# Patient Record
Sex: MALE | Race: WHITE | Employment: FULL TIME | URBAN - METROPOLITAN AREA
[De-identification: names, ages, dates, MRNs, and addresses within clinical notes are randomized per-mention and may not be internally consistent; named-entity substitution may affect disease eponyms.]

---

## 2017-03-01 ENCOUNTER — ONCOLOGY VISIT (OUTPATIENT)
Dept: ONCOLOGY | Facility: CLINIC | Age: 52
End: 2017-03-01
Attending: INTERNAL MEDICINE
Payer: COMMERCIAL

## 2017-03-01 VITALS
DIASTOLIC BLOOD PRESSURE: 87 MMHG | SYSTOLIC BLOOD PRESSURE: 126 MMHG | TEMPERATURE: 98.1 F | WEIGHT: 273.8 LBS | RESPIRATION RATE: 20 BRPM | BODY MASS INDEX: 35.39 KG/M2 | OXYGEN SATURATION: 96 % | HEART RATE: 118 BPM

## 2017-03-01 DIAGNOSIS — C82.80 OTHER TYPE OF FOLLICULAR LYMPHOMA, UNSPECIFIED BODY REGION (H): Primary | ICD-10-CM

## 2017-03-01 DIAGNOSIS — C82.80 OTHER TYPE OF FOLLICULAR LYMPHOMA, UNSPECIFIED BODY REGION (H): ICD-10-CM

## 2017-03-01 LAB
ALBUMIN SERPL-MCNC: 4.1 G/DL (ref 3.4–5)
ALP SERPL-CCNC: 98 U/L (ref 40–150)
ALT SERPL W P-5'-P-CCNC: 27 U/L (ref 0–70)
ANION GAP SERPL CALCULATED.3IONS-SCNC: 9 MMOL/L (ref 3–14)
AST SERPL W P-5'-P-CCNC: 14 U/L (ref 0–45)
BASOPHILS # BLD AUTO: 0.1 10E9/L (ref 0–0.2)
BASOPHILS NFR BLD AUTO: 1 %
BILIRUB SERPL-MCNC: 0.4 MG/DL (ref 0.2–1.3)
BUN SERPL-MCNC: 12 MG/DL (ref 7–30)
CALCIUM SERPL-MCNC: 8.8 MG/DL (ref 8.5–10.1)
CHLORIDE SERPL-SCNC: 106 MMOL/L (ref 94–109)
CO2 SERPL-SCNC: 25 MMOL/L (ref 20–32)
CREAT SERPL-MCNC: 0.95 MG/DL (ref 0.66–1.25)
DIFFERENTIAL METHOD BLD: ABNORMAL
EOSINOPHIL # BLD AUTO: 0.1 10E9/L (ref 0–0.7)
EOSINOPHIL NFR BLD AUTO: 1.8 %
ERYTHROCYTE [DISTWIDTH] IN BLOOD BY AUTOMATED COUNT: 11.9 % (ref 10–15)
GFR SERPL CREATININE-BSD FRML MDRD: 84 ML/MIN/1.7M2
GLUCOSE SERPL-MCNC: 85 MG/DL (ref 70–99)
HCT VFR BLD AUTO: 43.2 % (ref 40–53)
HGB BLD-MCNC: 16 G/DL (ref 13.3–17.7)
IMM GRANULOCYTES # BLD: 0.1 10E9/L (ref 0–0.4)
IMM GRANULOCYTES NFR BLD: 1.5 %
LDH SERPL L TO P-CCNC: 264 U/L (ref 85–227)
LYMPHOCYTES # BLD AUTO: 1.6 10E9/L (ref 0.8–5.3)
LYMPHOCYTES NFR BLD AUTO: 23.5 %
MCH RBC QN AUTO: 32.3 PG (ref 26.5–33)
MCHC RBC AUTO-ENTMCNC: 37 G/DL (ref 31.5–36.5)
MCV RBC AUTO: 87 FL (ref 78–100)
MONOCYTES # BLD AUTO: 0.8 10E9/L (ref 0–1.3)
MONOCYTES NFR BLD AUTO: 11.4 %
NEUTROPHILS # BLD AUTO: 4.1 10E9/L (ref 1.6–8.3)
NEUTROPHILS NFR BLD AUTO: 60.8 %
NRBC # BLD AUTO: 0 10*3/UL
NRBC BLD AUTO-RTO: 0 /100
PLATELET # BLD AUTO: 199 10E9/L (ref 150–450)
POTASSIUM SERPL-SCNC: 3.7 MMOL/L (ref 3.4–5.3)
PROT SERPL-MCNC: 7 G/DL (ref 6.8–8.8)
RBC # BLD AUTO: 4.95 10E12/L (ref 4.4–5.9)
SODIUM SERPL-SCNC: 140 MMOL/L (ref 133–144)
WBC # BLD AUTO: 6.8 10E9/L (ref 4–11)

## 2017-03-01 PROCEDURE — 85025 COMPLETE CBC W/AUTO DIFF WBC: CPT | Performed by: INTERNAL MEDICINE

## 2017-03-01 PROCEDURE — 80053 COMPREHEN METABOLIC PANEL: CPT | Performed by: INTERNAL MEDICINE

## 2017-03-01 PROCEDURE — 36415 COLL VENOUS BLD VENIPUNCTURE: CPT

## 2017-03-01 PROCEDURE — 83615 LACTATE (LD) (LDH) ENZYME: CPT | Performed by: INTERNAL MEDICINE

## 2017-03-01 PROCEDURE — 99213 OFFICE O/P EST LOW 20 MIN: CPT | Mod: ZP | Performed by: INTERNAL MEDICINE

## 2017-03-01 PROCEDURE — 99212 OFFICE O/P EST SF 10 MIN: CPT | Mod: ZF

## 2017-03-01 RX ORDER — IBUPROFEN 200 MG
200 TABLET ORAL
COMMUNITY
Start: 2014-10-20 | End: 2018-02-14

## 2017-03-01 NOTE — PROGRESS NOTES
HISTORY OF PRESENT ILLNESS:  I saw and examined Julien Lee as he returns in followup of his diagnosis of follicular lymphoma.  The patient is a 51-year-old originally diagnosed in 07/2002.  Over the past 14-1/2 years he has received multiple therapies including oral chlorambucil, rituximab as a single agent, R-CVP and, most recently, R-bendamustine.  The latter was given for 6 cycles ending in 03/2015.  The patient had an excellent response to this treatment and has been followed without evidence for progressive disease since the end of treatment.       The patient was last in my clinic on 11/16/2016.  At present, he feels quite well with no complaints on a 10-point review of systems.  He has had no interval infections.      MEDICATIONS:  None.      ALLERGIES:  None.      PHYSICAL EXAMINATION:   VITAL SIGNS:  Weight 124.2 kg (stable), blood pressure 126/87, pulse 80 and regular,    respirations 16-20, O2 saturation 96%, temperature 98.1.   HEENT:  Anicteric.  No conjunctival injection.  Pupils equal and reactive.  EOM - intact.     Oropharynx - no masses.  Mucosa - moist without plaque or ulceration.    NECK:  No cervical/supraclavicular adenopathy.    CHEST:  Clear to auscultation.    AXILLAE:  No nodes.    HEART:  Regular rhythm without murmur.    ABDOMEN:  Soft and nontender.  Body habitus makes examination difficult, but no organomegaly or mass detected.  Bowel sounds active.  No inguinal nodes.     SKIN AND SOFT TISSUE:  No temporal masses (right temporal subcutaneous involvement prior to most recent chemotherapy).       LABORATORY:     Hemoglobin 16.0 grams percent with 6,800 wbc's (normal differential) and 199,000 platelets.   Electrolytes, calcium, creatinine (0.95) - normal.  Glucose 85.    Liver enzymes - normal.  LDH - borderline elevated at 264 (normal <227).       ASSESSMENT AND PLAN:  Follicular lymphoma (grade 1), stage DERIK - Mr. Lee is now 2 years following completion of 6 cycles of rituximab  plus bendamustine.  He has no clinical evidence of recurrent disease and no significant residua from the treatment.  Will monitor LDH.     The patient will return in approximately 4 months with laboratory studies and CT scan.     Von Zaidi MD  Professor of Medicine  Oncology  [facility]  Office: [phone]  Clinic Fax: [phone]

## 2017-03-01 NOTE — MR AVS SNAPSHOT
After Visit Summary   3/1/2017    Julien Lee    MRN: [id number]           Patient Information     Date Of Birth          1965        Visit Information        Provider Department      3/1/2017 3:30 PM Von Zaidi MD [facility]        Today's Diagnoses     Other type of follicular lymphoma, unspecified body region (H)    -  1      Care Instructions    Return in July with labs and CT        Follow-ups after your visit        Follow-up notes from your care team     Return in about 4 months (around 7/12/2017) for Physical Exam, Lab Work.      Your next 10 appointments already scheduled     Jul 12, 2017  3:00 PM CDT   [facility] Lab Draw with  [facility] LAB DRAW   [facility] Lab Draw ([facility])    [address] [place] [id number] [phone]            Jul 12, 2017  3:30 PM CDT   (Arrive by 3:15 PM)   Return Visit with Von Zaidi MD   [facility] ([facility])    [address] [place] [id number] [phone]              Future tests that were ordered for you today     Open Future Orders        Priority Expected Expires Ordered    CT Chest/Abdomen/Pelvis w Contrast Routine 7/1/2017 3/1/2018 3/1/2017    Comprehensive metabolic panel Routine 7/1/2017 3/1/2018 3/1/2017    Lactate Dehydrogenase Routine 7/1/2017 3/1/2018 3/1/2017    *CBC with platelets differential Routine 7/1/2017 3/1/2018 3/1/2017            Who to contact     If you have questions or need follow up information about today's clinic visit or your schedule please contact [facility] directly at [phone].  Normal or non-critical lab and imaging results will be communicated to you by MyChart, letter or phone within 4 business days after the clinic has received the results. If you do not hear from us within 7 days, please contact the clinic through  [employer] or phone. If you have a critical or abnormal lab result, we will notify you by phone as soon as possible.  Submit refill requests through [employer] or call your pharmacy and they will forward the refill request to us. Please allow 3 business days for your refill to be completed.          Additional Information About Your Visit        [employer] Information     [employer] gives you secure access to your electronic health record. If you see a primary care provider, you can also send messages to your care team and make appointments. If you have questions, please call your primary care clinic.  If you do not have a primary care provider, please call [phone] and they will assist you.        Care EveryWhere ID     This is your Care EveryWhere ID. This could be used by other organizations to access your [place] medical records  [id number]        Your Vitals Were     Pulse Temperature Respirations Pulse Oximetry BMI (Body Mass Index)       118 98.1  F (36.7  C) (Oral) 20 96% 35.39 kg/m2        Blood Pressure from Last 3 Encounters:   03/01/17 126/87   11/16/16 133/89   08/10/16 147/80    Weight from Last 3 Encounters:   03/01/17 124.2 kg (273 lb 12.8 oz)   11/16/16 122.4 kg (269 lb 13.5 oz)   08/10/16 123.5 kg (272 lb 3.2 oz)               Primary Care Provider    Unknown Primary MD Caridad       No address on file        Thank you!     Thank you for choosing [facility]  for your care. Our goal is always to provide you with excellent care. Hearing back from our patients is one way we can continue to improve our services. Please take a few minutes to complete the written survey that you may receive in the mail after your visit with us. Thank you!             Your Updated Medication List - Protect others around you: Learn how to safely use, store and throw away your medicines at www.disposemymeds.org.          This list is accurate as of: 3/1/17  4:17 PM.  Always use your most recent med list.                    Brand Name Dispense Instructions for use    ibuprofen 200 MG tablet    ADVIL/MOTRIN     Take 200 mg by mouth

## 2017-03-01 NOTE — Clinical Note
3/1/2017       RE: Julien Lee  [address] [facility] [address]     Dear Colleague,    Thank you for referring your patient, Julien Lee, to the [facility]. Please see a copy of my visit note below.    HISTORY OF PRESENT ILLNESS:  I saw and examined Julien Lee as he returns in followup of his diagnosis of follicular lymphoma.  The patient is a 51-year-old originally diagnosed in 07/2002.  Over the past 14-1/2 years he has received multiple therapies including oral chlorambucil, rituximab as a single agent, R-CVP and, most recently, R-bendamustine.  The latter was given for 6 cycles ending in 03/2015.  The patient had an excellent response to this treatment and has been followed without evidence for progressive disease since the end of treatment.       The patient was last in my clinic on 11/16/2016.  At present, he feels quite well with no complaints on a 10-point review of systems.  He has had no interval infections.      MEDICATIONS:  None.      ALLERGIES:  None.      PHYSICAL EXAMINATION:   VITAL SIGNS:  Weight 124.2 kg (stable), blood pressure 126/87, pulse 80 and regular,    respirations 16-20, O2 saturation 96%, temperature 98.1.   HEENT:  Anicteric.  No conjunctival injection.  Pupils equal and reactive.  EOM - intact.     Oropharynx - no masses.  Mucosa - moist without plaque or ulceration.    NECK:  No cervical/supraclavicular adenopathy.    CHEST:  Clear to auscultation.    AXILLAE:  No nodes.    HEART:  Regular rhythm without murmur.    ABDOMEN:  Soft and nontender.  Body habitus makes examination difficult, but no organomegaly or mass detected.  Bowel sounds active.  No inguinal nodes.     SKIN AND SOFT TISSUE:  No temporal masses (right temporal subcutaneous involvement prior to most recent chemotherapy).       LABORATORY:     Hemoglobin 16.0 grams percent with 6,800 wbc's (normal differential) and 199,000 platelets.   Electrolytes, calcium, creatinine  (0.95) - normal.  Glucose 85.    Liver enzymes - normal.  LDH - borderline elevated at 264 (normal <227).       ASSESSMENT AND PLAN:  Follicular lymphoma (grade 1), stage DERIK - Mr. Lee is now 2 years following completion of 6 cycles of rituximab plus bendamustine.  He has no clinical evidence of recurrent disease and no significant residua from the treatment.  Will monitor LDH.     The patient will return in approximately 4 months with laboratory studies and CT scan.     Von Zaidi MD  Professor of Medicine  Oncology  [facility]  Office: [phone]  Clinic Fax: [phone]            Again, thank you for allowing me to participate in the care of your patient.      Sincerely,    Von Zaidi MD

## 2017-03-01 NOTE — NURSING NOTE
"Julien Lee is a 51 year old male who presents for:  Chief Complaint   Patient presents with     Blood Draw     Pt with hx of lymphoma labs collected via venipuncture.      Oncology Clinic Visit     Return patient visit- Follicular lymphoma (H)        Initial Vitals:  /87  Pulse 118  Temp 98.1  F (36.7  C) (Oral)  Resp 20  Wt 124.2 kg (273 lb 12.8 oz)  SpO2 96%  BMI 35.39 kg/m2 Estimated body mass index is 35.39 kg/(m^2) as calculated from the following:    Height as of 8/10/16: 1.873 m (6' 1.75\").    Weight as of this encounter: 124.2 kg (273 lb 12.8 oz).. Body surface area is 2.54 meters squared. BP completed using cuff size: NA (Not Taken)  Data Unavailable No LMP for male patient. Allergies and medications reviewed.     Medications: Medication refills not needed today.  Pharmacy name entered into [employer]:    [id number] [place] [facility] - [place], [place] - [address] [place] [address] PHARMACY [facility] - [place], [place] - [address] [facility] [phone]    Comments: vitals done in lab    5 minutes for nursing intake (face to face time)   Angie Michele CMA        "

## 2017-03-01 NOTE — NURSING NOTE
Chief Complaint   Patient presents with     Blood Draw     Pt with hx of lymphoma labs collected via venipuncture.

## 2017-06-26 DIAGNOSIS — C82.80 OTHER TYPE OF FOLLICULAR LYMPHOMA, UNSPECIFIED BODY REGION (H): Primary | ICD-10-CM

## 2017-07-10 DIAGNOSIS — C82.80 OTHER TYPE OF FOLLICULAR LYMPHOMA, UNSPECIFIED BODY REGION (H): ICD-10-CM

## 2017-07-10 LAB
ALBUMIN SERPL-MCNC: 4.2 G/DL (ref 3.4–5)
ALP SERPL-CCNC: 90 U/L (ref 40–150)
ALT SERPL W P-5'-P-CCNC: 29 U/L (ref 0–70)
ANION GAP SERPL CALCULATED.3IONS-SCNC: 8 MMOL/L (ref 3–14)
AST SERPL W P-5'-P-CCNC: 16 U/L (ref 0–45)
BASOPHILS # BLD AUTO: 0 10E9/L (ref 0–0.2)
BASOPHILS NFR BLD AUTO: 0.4 %
BILIRUB SERPL-MCNC: 1.2 MG/DL (ref 0.2–1.3)
BUN SERPL-MCNC: 18 MG/DL (ref 7–30)
CALCIUM SERPL-MCNC: 9.1 MG/DL (ref 8.5–10.1)
CHLORIDE SERPL-SCNC: 106 MMOL/L (ref 94–109)
CHOLEST SERPL-MCNC: 146 MG/DL
CO2 SERPL-SCNC: 23 MMOL/L (ref 20–32)
CREAT SERPL-MCNC: 0.94 MG/DL (ref 0.66–1.25)
DIFFERENTIAL METHOD BLD: NORMAL
EOSINOPHIL # BLD AUTO: 0.1 10E9/L (ref 0–0.7)
EOSINOPHIL NFR BLD AUTO: 1 %
ERYTHROCYTE [DISTWIDTH] IN BLOOD BY AUTOMATED COUNT: 11.7 % (ref 10–15)
GFR SERPL CREATININE-BSD FRML MDRD: 84 ML/MIN/1.7M2
GLUCOSE SERPL-MCNC: 102 MG/DL (ref 70–99)
HCT VFR BLD AUTO: 45.2 % (ref 40–53)
HDLC SERPL-MCNC: 37 MG/DL
HGB BLD-MCNC: 15.7 G/DL (ref 13.3–17.7)
IMM GRANULOCYTES # BLD: 0 10E9/L (ref 0–0.4)
IMM GRANULOCYTES NFR BLD: 0.4 %
LDH SERPL L TO P-CCNC: 202 U/L (ref 85–227)
LDLC SERPL CALC-MCNC: 93 MG/DL
LYMPHOCYTES # BLD AUTO: 1.1 10E9/L (ref 0.8–5.3)
LYMPHOCYTES NFR BLD AUTO: 15.7 %
MCH RBC QN AUTO: 30.9 PG (ref 26.5–33)
MCHC RBC AUTO-ENTMCNC: 34.7 G/DL (ref 31.5–36.5)
MCV RBC AUTO: 89 FL (ref 78–100)
MONOCYTES # BLD AUTO: 0.7 10E9/L (ref 0–1.3)
MONOCYTES NFR BLD AUTO: 9.7 %
NEUTROPHILS # BLD AUTO: 5.2 10E9/L (ref 1.6–8.3)
NEUTROPHILS NFR BLD AUTO: 72.8 %
NONHDLC SERPL-MCNC: 109 MG/DL
NRBC # BLD AUTO: 0 10*3/UL
NRBC BLD AUTO-RTO: 0 /100
PLATELET # BLD AUTO: 162 10E9/L (ref 150–450)
POTASSIUM SERPL-SCNC: 3.9 MMOL/L (ref 3.4–5.3)
PROT SERPL-MCNC: 7.1 G/DL (ref 6.8–8.8)
RBC # BLD AUTO: 5.08 10E12/L (ref 4.4–5.9)
SODIUM SERPL-SCNC: 137 MMOL/L (ref 133–144)
TRIGL SERPL-MCNC: 78 MG/DL
WBC # BLD AUTO: 7.1 10E9/L (ref 4–11)

## 2017-07-12 ENCOUNTER — ONCOLOGY VISIT (OUTPATIENT)
Dept: ONCOLOGY | Facility: CLINIC | Age: 52
End: 2017-07-12
Attending: INTERNAL MEDICINE
Payer: COMMERCIAL

## 2017-07-12 VITALS
OXYGEN SATURATION: 99 % | BODY MASS INDEX: 31.26 KG/M2 | HEIGHT: 74 IN | SYSTOLIC BLOOD PRESSURE: 126 MMHG | DIASTOLIC BLOOD PRESSURE: 85 MMHG | RESPIRATION RATE: 14 BRPM | WEIGHT: 243.6 LBS | HEART RATE: 93 BPM

## 2017-07-12 DIAGNOSIS — C82.80 OTHER TYPE OF FOLLICULAR LYMPHOMA, UNSPECIFIED BODY REGION (H): Primary | ICD-10-CM

## 2017-07-12 PROCEDURE — 99212 OFFICE O/P EST SF 10 MIN: CPT | Mod: ZF

## 2017-07-12 PROCEDURE — 99213 OFFICE O/P EST LOW 20 MIN: CPT | Mod: ZP | Performed by: INTERNAL MEDICINE

## 2017-07-12 ASSESSMENT — PAIN SCALES - GENERAL: PAINLEVEL: NO PAIN (0)

## 2017-07-12 NOTE — NURSING NOTE
"Oncology Rooming Note    July 12, 2017 3:34 PM   Julien Lee is a 51 year old male who presents for:    Chief Complaint   Patient presents with     Oncology Clinic Visit     follow up visit related to lymphoma     Initial Vitals: /85  Pulse 93  Resp 14  Ht 1.874 m (6' 1.78\")  Wt 110.5 kg (243 lb 9.6 oz)  SpO2 99%  BMI 31.46 kg/m2 Estimated body mass index is 31.46 kg/(m^2) as calculated from the following:    Height as of this encounter: 1.874 m (6' 1.78\").    Weight as of this encounter: 110.5 kg (243 lb 9.6 oz). Body surface area is 2.4 meters squared.  No Pain (0) Comment: Data Unavailable   No LMP for male patient.  Allergies reviewed: Yes  Medications reviewed: Yes    Medications: Medication refills not needed today.  Pharmacy name entered into [employer]:    [id number] [place], [place] - [address] [place] [address] PHARMACY [place], [place] - [address] [facility] [phone]    Clinical concerns: No new clinical concerns.    7 minutes for nursing intake (face to face time)     Jane Polo LPN              "

## 2017-07-12 NOTE — PROGRESS NOTES
Past oncologic summary: Julien Lee is a 51 year old with a diagnosis of follicular lymphoma in 07/2002.  Over the past 15 years he has received multiple therapies including oral chlorambucil, rituximab as a single agent, R-CVP and, most recently, R-bendamustine.  The latter was given for 6 cycles ending in 03/2015.  The patient had an excellent response to this treatment and has been followed without evidence for progressive disease since the end of treatment.    Interim history: Mr. Lee was last in clinic on 03/01/2017 and has been doing very well over the course of the past 4 months. Notably, he has gone on the Lombardi diet and intentionally lost 30 pounds. He is feeling better, more energetic and is more active. He has had no interval infections, fevers, night sweats. Also, no identifiable lymphadenopathy or soft tissue masses.    10 point review of systems is otherwise negative.    Medications: None    Allergies: None reported.    Physical examination:    Vital signs: Weight 110.5 kg (compared to 124.2 kg on March 1, 2017), blood pressure 126/85, pulse 93 and regular, respirations 14, temperature not recorded, O2 sat  99%.  HEENT: Anicteric. No conjunctival injection. No sinus tenderness. Oropharynx-no masses. Mucosa-moist and without lesion.  Neck: No cervical or supra-clavicular adenopathy. Thyroid-no nodules.  Chest: Clear to auscultation.  Axillae: No nodes.  Heart: Regular rhythm without murmur, gallop.  Abdomen: Soft and nontender. No masses or organomegaly. Bowel sounds active. No inguinal/femoral nodes.  Extremities: No lower extremity edema. No epitrochlear nodes.  Skin and soft tissue: No rashes, petechiae or ecchymoses. No palpable subcutaneous masses, including none over the right anterolateral upper abdomen (see CT).    Laboratory:    Hemoglobin 15.7, WBC 7100 (73% neutrophils, 16% lymphocytes) and 162,000 platelets.  Electrolytes, calcium, creatinine (0.94)-normal.  Liver enzymes, including  "LDH and bilirubin-normal.  Glucose 102.  Cholesterol 146 HDL cholesterol 37, LDL cholesterol 93, non-HDL cholesterol 109, triglycerides 78.    Imaging: CT scan    1. Mild interval enlargement of previously treated bilateral iliac chain and inguinal lymph nodes without significant change in size of previously treated mesenteric, retroperitoneal, and axillary lymphadenopathy. Findings could represent reactive lymphadenopathy or recurrence of lymphoma. Short interval follow up or PET/CT/tissue sampling could be considered.  2. New 14 mm nonspecific soft tissue nodule in the subcutaneous fat of the upper right anterior abdomen. Differential includes injection granuloma, or recurrence of disease as seen on prior studies, for example 10/9/2013.      Assessment and plan: Follicular lymphoma (grade 1), stage DERIK:  Patient is now approaching 2-1/2 years following completion of most recent therapy with rituximab plus bendamustine. No evidence of disease on physical examination and laboratory studies are negative. There are scattered nodes that have shown minimal change, especially in the abdomen. Also, a new subcutaneous nodule that is small and not, detected by physical examination. He has had soft tissue involvement in the past. The significance of these current findings is unclear, but will be monitored.    Overall, the patient appears to be doing quite well. Return in 4 months without planned laboratory studies.    Mr. Lee mentioned his 20-year-old daughter has been experiencing \"night sweats\". In view of his history and knowledge of lymphoma, this is of concern to him. We discussed the implications and I suggested that they may wish to first check for an accompanying fever. If there is a fever she should be seen by her primary care provider. If no fever, but symptoms persist, addressing them via her pPCP would be most appropriate.    Von Zaidi MD  Professor of Medicine  Oncology  [place] [facility]" [place]  Office: [phone]  Clinic Fax: [phone]

## 2017-11-14 ASSESSMENT — ENCOUNTER SYMPTOMS
ORTHOPNEA: 0
LIGHT-HEADEDNESS: 1
EXERCISE INTOLERANCE: 0
HYPERTENSION: 0
SYNCOPE: 0
HYPOTENSION: 0
LEG PAIN: 0
SLEEP DISTURBANCES DUE TO BREATHING: 0
PALPITATIONS: 0

## 2017-11-15 ENCOUNTER — ONCOLOGY VISIT (OUTPATIENT)
Dept: ONCOLOGY | Facility: CLINIC | Age: 52
End: 2017-11-15
Attending: INTERNAL MEDICINE
Payer: COMMERCIAL

## 2017-11-15 VITALS
TEMPERATURE: 98.9 F | WEIGHT: 253 LBS | DIASTOLIC BLOOD PRESSURE: 88 MMHG | OXYGEN SATURATION: 95 % | SYSTOLIC BLOOD PRESSURE: 149 MMHG | RESPIRATION RATE: 18 BRPM | BODY MASS INDEX: 32.47 KG/M2 | HEART RATE: 104 BPM | HEIGHT: 74 IN

## 2017-11-15 DIAGNOSIS — R00.0 TACHYCARDIA: Primary | ICD-10-CM

## 2017-11-15 DIAGNOSIS — C82.80 OTHER TYPE OF FOLLICULAR LYMPHOMA, UNSPECIFIED BODY REGION (H): ICD-10-CM

## 2017-11-15 PROCEDURE — 99214 OFFICE O/P EST MOD 30 MIN: CPT | Mod: ZP | Performed by: INTERNAL MEDICINE

## 2017-11-15 PROCEDURE — 93010 ELECTROCARDIOGRAM REPORT: CPT | Performed by: INTERNAL MEDICINE

## 2017-11-15 PROCEDURE — 99213 OFFICE O/P EST LOW 20 MIN: CPT | Mod: ZF

## 2017-11-15 RX ORDER — IBUPROFEN 200 MG
200 TABLET ORAL PRN
COMMUNITY
Start: 2014-10-20 | End: 2018-09-19

## 2017-11-15 ASSESSMENT — PAIN SCALES - GENERAL: PAINLEVEL: NO PAIN (0)

## 2017-11-15 NOTE — MR AVS SNAPSHOT
After Visit Summary   11/15/2017    Julien Lee    MRN: [id number]           Patient Information     Date Of Birth          1965        Visit Information        Provider Department      11/15/2017 3:30 PM Von Zaidi MD [facility]        Today's Diagnoses     Tachycardia    -  1       Follow-ups after your visit        Follow-up notes from your care team     Return in about 3 months (around 2/15/2018) for Physical Exam, Lab Work.      Your next 10 appointments already scheduled     Feb 14, 2018  2:30 PM CST   [facility] Lab Draw with  [facility] LAB DRAW   [facility] Lab Draw ([facility])    [address] [place] [id number] [phone]            Feb 14, 2018  3:00 PM CST   (Arrive by 2:45 PM)   Return Visit with Von Zaidi MD   [facility] ([facility])    [address] [place] [id number] [phone]              Who to contact     If you have questions or need follow up information about today's clinic visit or your schedule please contact [facility] directly at [phone].  Normal or non-critical lab and imaging results will be communicated to you by MyChart, letter or phone within 4 business days after the clinic has received the results. If you do not hear from us within 7 days, please contact the clinic through MyChart or phone. If you have a critical or abnormal lab result, we will notify you by phone as soon as possible.  Submit refill requests through [employer] or call your pharmacy and they will forward the refill request to us. Please allow 3 business days for your refill to be completed.          Additional Information About Your Visit        [employer] Information     [employer] gives you secure access to your electronic health record. If you see a primary care provider, you can also send messages to your  "care team and make appointments. If you have questions, please call your primary care clinic.  If you do not have a primary care provider, please call [phone] and they will assist you.        Care EveryWhere ID     This is your Care EveryWhere ID. This could be used by other organizations to access your [place] medical records  [id number]        Your Vitals Were     Pulse Temperature Respirations Height Pulse Oximetry BMI (Body Mass Index)    104 98.9  F (37.2  C) (Tympanic) 18 1.874 m (6' 1.78\") 95% 32.68 kg/m2       Blood Pressure from Last 3 Encounters:   11/15/17 149/88   07/12/17 126/85   03/01/17 126/87    Weight from Last 3 Encounters:   11/15/17 114.8 kg (253 lb)   07/12/17 110.5 kg (243 lb 9.6 oz)   03/01/17 124.2 kg (273 lb 12.8 oz)              We Performed the Following     EKG 12-lead complete w/read - Clinics        Primary Care Provider    None Specified       No primary provider on file.        Equal Access to Services     [facility]: [place], washeldonda shelly, qaybta andreyaljorge a posada, stephanie moura . So [facility] [phone].    ATENCIÓN: Si habla español, tiene a rivera disposición servicios gratuitos de asistencia lingüística. Llame al [phone].    We comply with applicable federal civil rights laws and [place] laws. We do not discriminate on the basis of race, color, national origin, age, disability, sex, sexual orientation, or gender identity.            Thank you!     Thank you for choosing [facility]  for your care. Our goal is always to provide you with excellent care. Hearing back from our patients is one way we can continue to improve our services. Please take a few minutes to complete the written survey that you may receive in the mail after your visit with us. Thank you!             Your Updated Medication List - Protect others around you: Learn how to safely use, store and throw away your medicines at " www.disposemymeds.org.          This list is accurate as of: 11/15/17 11:59 PM.  Always use your most recent med list.                   Brand Name Dispense Instructions for use Diagnosis    * ibuprofen 200 MG tablet    ADVIL/MOTRIN     Take 200 mg by mouth    Other type of follicular lymphoma, unspecified body region (H)       * ibuprofen 200 MG tablet    ADVIL/MOTRIN     Take 200 mg by mouth as needed        * Notice:  This list has 2 medication(s) that are the same as other medications prescribed for you. Read the directions carefully, and ask your doctor or other care provider to review them with you.

## 2017-11-15 NOTE — PROGRESS NOTES
"ONCOLOGY SUMMARY:  Julien Lee is a 51 year old with a diagnosis of follicular lymphoma in 07/2002.  Over the past 15 years he has received multiple therapies including oral chlorambucil, rituximab as a single agent, R-CVP and, most recently, R-bendamustine.  The latter was given for 6 cycles ending in 03/2015.  The patient had an excellent response to this treatment and has been followed without evidence for progressive disease since the end of treatment.       INTERVAL HISTORY:  I last saw Mr. Lee on 07/12/2017.  At that time, he had had a CT scan that showed very small increases in some of the previously modestly enlarged nodes.  Also, had intentionally lost about 30 lbs on the Lombardi diet, but has not been successful in maintaining that weight loss.     Mr. Lee indicates that he has been doing well over the last 3 months.  However, upon close questioning he indicates that he periodically has been \"lightheaded\".  This happens 1-2 times a week and is unpredictable.  It is not associated with activities, sitting or standing, lack of food, etc.  It generally does not happen when he is sitting down.  The episodes last approximately 5 minutes and are not accompanied by vertigo, nausea or visual changes.  Also, he has no palpitations, chest discomfort or dyspnea.  This has been happening for a couple of months.       No fevers, night sweats or weight loss.  No interval infections, headaches.        REVIEW OF SYSTEMS:  A 10-point review of systems is otherwise negative.      MEDICATIONS:  Ibuprofen p.r.n.      ALLERGIES:  None reported.      PHYSICAL EXAMINATION:   VITAL SIGNS:  Weight 114.8 kg (compared with 110.5 kg on 07/12), blood pressure 149/88, pulse 104, respirations 18, temperature 98.9, O2 saturation 95% on room air.     Recumbent blood pressure 131/84 (left arm), pulse 97.  Sitting 140/97, pulse 109.     HEENT:  Pupils equal and reactive.  EOM - intact.  Anicteric.  Fundi - appear benign.  No " diplopia. EOM - intact. Oropharynx - no masses.  Mucosa - moist, without lesion.    NECK:  Supple.  No cervical/supraclavicular adenopathy.   CHEST:  Clear to auscultation.   AXILLAE:  Normal.   HEART:  Regular rhythm.  Tachycardic on exam at 120.  No gallop or rub.   ABDOMEN:  Soft and nontender.  Bowel sounds present.  No tenderness.  No organomegaly or mass.  No inguinal nodes.   EXTREMITIES:  No lower extremity edema.   SKIN:  No rashes.      LABORATORY: (none obtained)     EKG - Normal EKG, sinus rhythm @ 99..       ASSESSMENT AND PLAN:  Follicular lymphoma (grade 1), stage DERIK - The patient continues to do well following bendamustine with the sixth cycle ending in 03/2015.  Although there was slight change on the most recent CT scan from July, there was no definitive progression and it was undetectable on clinical evaluation.  We will continue to monitor the patient at 3-month intervals and determine the appropriate timing of additional imaging at the time of his next visit.     Tachycardia - This has been an intermittent finding and today his tachycardia was fairly persistent throughout the clinic visit.  EKG is normal.  The light-headedness is new, but without accompanying findings. Although tachycardia may be seen during treatment with bendamustine, it would be a rare late effect. Encouraged Mr. Lee to report these symptoms to his primary clinic for further evaluation if persistent. He is in the process of changing physicians, so he was encouraged to make contact soon. If symptoms become problematic in the interim, he may contact us.     Return in 3 months with labs.    Von Zaidi MD  Professor of Medicine  Oncology  [facility]  Office: [phone]  Clinic Fax: [phone]      Cc: Carmella Stevenson, [place], [place]

## 2017-11-15 NOTE — NURSING NOTE
"Oncology Rooming Note    November 15, 2017 3:21 PM   Julien Lee is a 51 year old male who presents for:    Chief Complaint   Patient presents with     Oncology Clinic Visit     Return visit related to NHL     Initial Vitals: /88  Pulse 104  Temp 98.9  F (37.2  C) (Tympanic)  Resp 18  Ht 1.874 m (6' 1.78\")  Wt 114.8 kg (253 lb)  SpO2 95%  BMI 32.68 kg/m2 Estimated body mass index is 32.68 kg/(m^2) as calculated from the following:    Height as of this encounter: 1.874 m (6' 1.78\").    Weight as of this encounter: 114.8 kg (253 lb). Body surface area is 2.44 meters squared.  No Pain (0) Comment: Data Unavailable   No LMP for male patient.  Allergies reviewed: Yes  Medications reviewed: Yes    Medications: Medication refills not needed today.  Pharmacy name entered into [email or web address]:    [id number] [place], [place] [facility] [address] [place] [address] PHARMACY [place], [place] - [address] [facility] [address]    Clinical concerns: No new concerns. Provider was notified.    10 minutes for nursing intake (face to face time)     Ellen Yung LPN            "

## 2017-11-15 NOTE — LETTER
"11/15/2017      RE: Julien Lee  [address] [facility] [address] [facility] [address]       ONCOLOGY SUMMARY:  Julien Lee is a 51 year old with a diagnosis of follicular lymphoma in 07/2002.  Over the past 15 years he has received multiple therapies including oral chlorambucil, rituximab as a single agent, R-CVP and, most recently, R-bendamustine.  The latter was given for 6 cycles ending in 03/2015.  The patient had an excellent response to this treatment and has been followed without evidence for progressive disease since the end of treatment.       INTERVAL HISTORY:  I last saw Mr. Lee on 07/12/2017.  At that time, he had had a CT scan that showed very small increases in some of the previously modestly enlarged nodes.  Also, had intentionally lost about 30 lbs on the Lombardi diet, but has not been successful in maintaining that weight loss.     Mr. Lee indicates that he has been doing well over the last 3 months.  However, upon close questioning he indicates that he periodically has been \"lightheaded\".  This happens 1-2 times a week and is unpredictable.  It is not associated with activities, sitting or standing, lack of food, etc.  It generally does not happen when he is sitting down.  The episodes last approximately 5 minutes and are not accompanied by vertigo, nausea or visual changes.  Also, he has no palpitations, chest discomfort or dyspnea.  This has been happening for a couple of months.       No fevers, night sweats or weight loss.  No interval infections, headaches.        REVIEW OF SYSTEMS:  A 10-point review of systems is otherwise negative.      MEDICATIONS:  Ibuprofen p.r.n.      ALLERGIES:  None reported.      PHYSICAL EXAMINATION:   VITAL SIGNS:  Weight 114.8 kg (compared with 110.5 kg on 07/12), blood pressure 149/88, pulse 104, respirations 18, temperature 98.9, O2 saturation 95% on room air.     Recumbent blood pressure 131/84 (left arm), pulse 97.  Sitting 140/97, pulse 109.     HEENT:  " Pupils equal and reactive.  EOM - intact.  Anicteric.  Fundi - appear benign.  No diplopia. EOM - intact. Oropharynx - no masses.  Mucosa - moist, without lesion.    NECK:  Supple.  No cervical/supraclavicular adenopathy.   CHEST:  Clear to auscultation.   AXILLAE:  Normal.   HEART:  Regular rhythm.  Tachycardic on exam at 120.  No gallop or rub.   ABDOMEN:  Soft and nontender.  Bowel sounds present.  No tenderness.  No organomegaly or mass.  No inguinal nodes.   EXTREMITIES:  No lower extremity edema.   SKIN:  No rashes.      LABORATORY: (none obtained)     EKG - Normal EKG, sinus rhythm @ 99..       ASSESSMENT AND PLAN:  Follicular lymphoma (grade 1), stage DERIK - The patient continues to do well following bendamustine with the sixth cycle ending in 03/2015.  Although there was slight change on the most recent CT scan from July, there was no definitive progression and it was undetectable on clinical evaluation.  We will continue to monitor the patient at 3-month intervals and determine the appropriate timing of additional imaging at the time of his next visit.     Tachycardia - This has been an intermittent finding and today his tachycardia was fairly persistent throughout the clinic visit.  EKG is normal.  The light-headedness is new, but without accompanying findings. Although tachycardia may be seen during treatment with bendamustine, it would be a rare late effect. Encouraged Mr. Lee to report these symptoms to his primary clinic for further evaluation if persistent. He is in the process of changing physicians, so he was encouraged to make contact soon. If symptoms become problematic in the interim, he may contact us.     Return in 3 months with labs.    Von Zaidi MD  Professor of Medicine  Oncology  [facility]  Office: [phone]  Clinic Fax: [phone]      Cc: [facility], [place], [place]                Von Zaidi MD

## 2017-11-17 LAB — INTERPRETATION ECG - MUSE: NORMAL

## 2018-02-14 ENCOUNTER — APPOINTMENT (OUTPATIENT)
Dept: LAB | Facility: CLINIC | Age: 53
End: 2018-02-14
Attending: INTERNAL MEDICINE
Payer: COMMERCIAL

## 2018-02-14 ENCOUNTER — ONCOLOGY VISIT (OUTPATIENT)
Dept: ONCOLOGY | Facility: CLINIC | Age: 53
End: 2018-02-14
Attending: INTERNAL MEDICINE
Payer: COMMERCIAL

## 2018-02-14 VITALS
OXYGEN SATURATION: 94 % | SYSTOLIC BLOOD PRESSURE: 127 MMHG | BODY MASS INDEX: 33.22 KG/M2 | DIASTOLIC BLOOD PRESSURE: 80 MMHG | TEMPERATURE: 97.8 F | HEART RATE: 109 BPM | RESPIRATION RATE: 18 BRPM | WEIGHT: 257.2 LBS

## 2018-02-14 DIAGNOSIS — R00.0 TACHYCARDIA: ICD-10-CM

## 2018-02-14 DIAGNOSIS — C82.80 OTHER TYPE OF FOLLICULAR LYMPHOMA, UNSPECIFIED BODY REGION (H): ICD-10-CM

## 2018-02-14 LAB
ALBUMIN SERPL-MCNC: 4 G/DL (ref 3.4–5)
ALP SERPL-CCNC: 92 U/L (ref 40–150)
ALT SERPL W P-5'-P-CCNC: 26 U/L (ref 0–70)
ANION GAP SERPL CALCULATED.3IONS-SCNC: 9 MMOL/L (ref 3–14)
AST SERPL W P-5'-P-CCNC: 14 U/L (ref 0–45)
BASOPHILS # BLD AUTO: 0 10E9/L (ref 0–0.2)
BASOPHILS NFR BLD AUTO: 0.6 %
BILIRUB SERPL-MCNC: 0.3 MG/DL (ref 0.2–1.3)
BUN SERPL-MCNC: 20 MG/DL (ref 7–30)
CALCIUM SERPL-MCNC: 8.7 MG/DL (ref 8.5–10.1)
CHLORIDE SERPL-SCNC: 107 MMOL/L (ref 94–109)
CO2 SERPL-SCNC: 23 MMOL/L (ref 20–32)
CREAT SERPL-MCNC: 1 MG/DL (ref 0.66–1.25)
DIFFERENTIAL METHOD BLD: NORMAL
EOSINOPHIL # BLD AUTO: 0.2 10E9/L (ref 0–0.7)
EOSINOPHIL NFR BLD AUTO: 2.8 %
ERYTHROCYTE [DISTWIDTH] IN BLOOD BY AUTOMATED COUNT: 12.3 % (ref 10–15)
GFR SERPL CREATININE-BSD FRML MDRD: 78 ML/MIN/1.7M2
GLUCOSE SERPL-MCNC: 101 MG/DL (ref 70–99)
HCT VFR BLD AUTO: 42.7 % (ref 40–53)
HGB BLD-MCNC: 15.4 G/DL (ref 13.3–17.7)
IMM GRANULOCYTES # BLD: 0 10E9/L (ref 0–0.4)
IMM GRANULOCYTES NFR BLD: 0.5 %
LDH SERPL L TO P-CCNC: 195 U/L (ref 85–227)
LYMPHOCYTES # BLD AUTO: 1.6 10E9/L (ref 0.8–5.3)
LYMPHOCYTES NFR BLD AUTO: 24.3 %
MCH RBC QN AUTO: 31.9 PG (ref 26.5–33)
MCHC RBC AUTO-ENTMCNC: 36.1 G/DL (ref 31.5–36.5)
MCV RBC AUTO: 88 FL (ref 78–100)
MONOCYTES # BLD AUTO: 0.7 10E9/L (ref 0–1.3)
MONOCYTES NFR BLD AUTO: 11.4 %
NEUTROPHILS # BLD AUTO: 3.9 10E9/L (ref 1.6–8.3)
NEUTROPHILS NFR BLD AUTO: 60.4 %
NRBC # BLD AUTO: 0 10*3/UL
NRBC BLD AUTO-RTO: 0 /100
PLATELET # BLD AUTO: 184 10E9/L (ref 150–450)
POTASSIUM SERPL-SCNC: 3.7 MMOL/L (ref 3.4–5.3)
PROT SERPL-MCNC: 7 G/DL (ref 6.8–8.8)
RBC # BLD AUTO: 4.83 10E12/L (ref 4.4–5.9)
SODIUM SERPL-SCNC: 139 MMOL/L (ref 133–144)
TSH SERPL DL<=0.005 MIU/L-ACNC: 2.28 MU/L (ref 0.4–4)
WBC # BLD AUTO: 6.5 10E9/L (ref 4–11)

## 2018-02-14 PROCEDURE — G0463 HOSPITAL OUTPT CLINIC VISIT: HCPCS | Mod: ZF

## 2018-02-14 PROCEDURE — 84443 ASSAY THYROID STIM HORMONE: CPT | Performed by: INTERNAL MEDICINE

## 2018-02-14 PROCEDURE — 99214 OFFICE O/P EST MOD 30 MIN: CPT | Mod: ZP | Performed by: INTERNAL MEDICINE

## 2018-02-14 PROCEDURE — 80053 COMPREHEN METABOLIC PANEL: CPT | Performed by: INTERNAL MEDICINE

## 2018-02-14 PROCEDURE — 36415 COLL VENOUS BLD VENIPUNCTURE: CPT

## 2018-02-14 PROCEDURE — 83615 LACTATE (LD) (LDH) ENZYME: CPT | Performed by: INTERNAL MEDICINE

## 2018-02-14 PROCEDURE — 85025 COMPLETE CBC W/AUTO DIFF WBC: CPT | Performed by: INTERNAL MEDICINE

## 2018-02-14 ASSESSMENT — PAIN SCALES - GENERAL: PAINLEVEL: NO PAIN (0)

## 2018-02-14 NOTE — MR AVS SNAPSHOT
After Visit Summary   2/14/2018    Julien Lee    MRN: [id number]           Patient Information     Date Of Birth          1965        Visit Information        Provider Department      2/14/2018 3:00 PM Von Zaidi MD [facility]        Today's Diagnoses     Tachycardia        Other type of follicular lymphoma, unspecified body region (H)           Follow-ups after your visit        Follow-up notes from your care team     Return in about 3 months (around 5/14/2018) for Physical Exam.      Your next 10 appointments already scheduled     May 16, 2018  3:30 PM CDT   (Arrive by 3:15 PM)   Return Visit with Von Zaidi MD   [facility])    [address]  [address]  [place] [address] [phone]              Who to contact     If you have questions or need follow up information about today's clinic visit or your schedule please contact [facility] directly at [phone].  Normal or non-critical lab and imaging results will be communicated to you by Bridgevinehart, letter or phone within 4 business days after the clinic has received the results. If you do not hear from us within 7 days, please contact the clinic through [employer] or phone. If you have a critical or abnormal lab result, we will notify you by phone as soon as possible.  Submit refill requests through [employer] or call your pharmacy and they will forward the refill request to us. Please allow 3 business days for your refill to be completed.          Additional Information About Your Visit        Bridgevinehart Information     [employer]UAL[employer]ANT gives you secure access to your electronic health record. If you see a primary care provider, you can also send messages to your care team and make appointments. If you have questions, please call your primary care clinic.  If you do not have a primary care provider, please call [phone] and  they will assist you.        Care EveryWhere ID     This is your Care EveryWhere ID. This could be used by other organizations to access your [facility] medical records  [id number]        Your Vitals Were     Pulse Temperature Respirations Pulse Oximetry BMI (Body Mass Index)       109 97.8  F (36.6  C) (Oral) 18 94% 33.22 kg/m2        Blood Pressure from Last 3 Encounters:   02/14/18 127/80   11/15/17 149/88   07/12/17 126/85    Weight from Last 3 Encounters:   02/14/18 116.7 kg (257 lb 3.2 oz)   11/15/17 114.8 kg (253 lb)   07/12/17 110.5 kg (243 lb 9.6 oz)              We Performed the Following     *CBC with platelets differential     Comprehensive metabolic panel     Lactate Dehydrogenase     TSH with free T4 reflex          Today's Medication Changes          These changes are accurate as of 2/14/18 11:59 PM.  If you have any questions, ask your nurse or doctor.               These medicines have changed or have updated prescriptions.        Dose/Directions    ibuprofen 200 MG tablet   Commonly known as:  ADVIL/MOTRIN   This may have changed:  Another medication with the same name was removed. Continue taking this medication, and follow the directions you see here.   Used for:  Tachycardia, Other type of follicular lymphoma, unspecified body region (H)   Changed by:  Von Zaidi MD        Dose:  200 mg   Take 200 mg by mouth as needed   Refills:  0                Primary Care Provider    None Specified       No primary provider on file.        Equal Access to Services     CAREN TURNER : [place], susan bravo, qastephanie art. So [facility] [phone].    ATENCIÓN: Si habla español, tiene a rivera disposición servicios gratuitos de asistencia lingüística. Manuelito al [phone].    We comply with applicable federal civil rights laws and [place] laws. We do not discriminate on the basis of race, color, national origin, age, disability,  sex, sexual orientation, or gender identity.            Thank you!     Thank you for choosing [facility]  for your care. Our goal is always to provide you with excellent care. Hearing back from our patients is one way we can continue to improve our services. Please take a few minutes to complete the written survey that you may receive in the mail after your visit with us. Thank you!             Your Updated Medication List - Protect others around you: Learn how to safely use, store and throw away your medicines at www.disposemymeds.org.          This list is accurate as of 2/14/18 11:59 PM.  Always use your most recent med list.                   Brand Name Dispense Instructions for use Diagnosis    ibuprofen 200 MG tablet    ADVIL/MOTRIN     Take 200 mg by mouth as needed    Tachycardia, Other type of follicular lymphoma, unspecified body region (H)

## 2018-02-14 NOTE — PROGRESS NOTES
ONCOLOGY SUMMERY:  Julien Lee is a 52-year-old patient with a history of follicular lymphoma diagnosed originally in 07/2002.  He was considered stage DERIK with grade 1/3 follicular lymphoma.  Disease was noted in the lymph nodes within the abdomen and inguinal regions and interstitial involvement in the bone marrow.  Over the years, he has had disease at other sites, including above the diaphragm, neck, axillae, etc.  He has received multiple treatments including oral chlorambucil (2002- 2003), Rituximab (01-02/2008), chlorambucil again in 2008 for pleural effusion, R-CVP x6 cycles from 12/2009-03/2010, radiation (2660 cGy) to the right neck in 06-07/2010, and most recently 6 cycles of rituximab plus bendamustine from in 10/2014 - 03/2015.  He has not required therapy for the past 3 years.      INTERVAL HISTORY:  Mr. Lee was last in clinic on 11/15/2017.  At that clinic, he was tachycardic.  EKG was negative.  The episodes of tachycardia have not recurred.  However, he also has not identified a primary care physician, despite planning to do so.  He will look at the [facility], which is where his children go.      Otherwise, the past 3 months was uneventful.  No interval infections, fevers, night sweats or weight loss.  No cardiorespiratory symptoms, palpitations.      REVIEW OF SYSTEMS:  A 10-point review of systems is otherwise negative.      MEDICATIONS:  Ibuprofen p.r.n.      ALLERGIES:  None reported.      PHYSICAL EXAMINATION:   VITAL SIGNS:  Weight 116.7 kg (compared with 114.8 kg in November), blood pressure 127/80, pulse , respirations 18, temperature 97.8.  O2 saturation 94% on room air.   HEENT:  Anicteric.  No conjunctival injection.  Oropharynx with no masses.  Mucosa moist.  No plaque or ulceration.   NECK:  No cervical/supraclavicular nodes.  Trachea midline.  Thyroid not apparent.   CHEST:  Clear to auscultation.   AXILLAE:  No nodes.   HEART:  Regular rhythm.  Heart rate 90.   No murmur or gallop.   ABDOMEN:  Soft.  No tenderness.  The liver is at the right costal margin.  The spleen is not palpable or percussible.  No masses.  No tenderness.  Bowel sounds active.  No inguinal/femoral nodes.   EXTREMITIES:  No lower extremity edema.   SKIN:  No rashes.      LABORATORY DATA:  Hemoglobin 15.4 grams percent with 6500 WBCs (normal differential) and 184,000 platelets.   Electrolytes, calcium, creatinine (1.00), normal.   Liver enzymes, including serum LDH, normal.     TSH 2.28 (normal).      ASSESSMENT AND PLAN:  Follicular lymphoma (grade 1), stage IV-A.  Mr. Lee is doing very well now 15-1/2 years following diagnosis and nearly 3 years since completing 6 cycles of rituximab plus bendamustine.  He has modest pelvic/inguinal adenopathy on a CT scan, but nothing clinically apparent and only minimal progression.      We plan to continue to monitor the patient at 3-4-month intervals.  No laboratory studies upon return.  Discussed with patient the interval for next surveillance scan.  We will wait 1-2 years in view of a little change over the past few scans.  The patient is to report any new symptoms.     TSH was checked today in light of the tachycardia that he demonstrated at the time of his last visit as well as the fact that he has had neck irradiation.  Euthyroid by this evaluation.      Again discussed the importance of a primary care physician.  The patient indicates that he will pursue this.      Von Zaidi MD  Professor of Medicine  Oncology  [facility]  Office: [phone]  Clinic Fax: [phone]

## 2018-02-14 NOTE — NURSING NOTE
Chief Complaint   Patient presents with     Blood Draw     Labs drawn via  by RN. VS taken.     Brenda Matthews RN

## 2018-02-14 NOTE — NURSING NOTE
"Oncology Rooming Note    February 14, 2018 2:57 PM   Julien Lee is a 52 year old male who presents for:    Chief Complaint   Patient presents with     Blood Draw     Labs drawn via  by RN. VS taken.     Oncology Clinic Visit     Return for NHL, Labs      Initial Vitals: /80 (BP Location: Right arm, Patient Position: Sitting, Cuff Size: Adult Regular)  Pulse 109  Temp 97.8  F (36.6  C) (Oral)  Resp 18  Wt 116.7 kg (257 lb 3.2 oz)  SpO2 94%  BMI 33.22 kg/m2 Estimated body mass index is 33.22 kg/(m^2) as calculated from the following:    Height as of 11/15/17: 1.874 m (6' 1.78\").    Weight as of this encounter: 116.7 kg (257 lb 3.2 oz). Body surface area is 2.46 meters squared.  No Pain (0) Comment: Data Unavailable   No LMP for male patient.  Allergies reviewed: Yes  Medications reviewed: Yes    Medications: Medication refills not needed today.  Pharmacy name entered into [employer]:    [id number] [place], [place] - [address] [place] [address] PHARMACY [place], [place] - [address] [facility] [id number]    Clinical concerns: No concerns  Zaidi  was notified.    6   minutes for nursing intake (face to face time)     Hodan Huerta MA              "

## 2018-05-16 ENCOUNTER — ONCOLOGY VISIT (OUTPATIENT)
Dept: ONCOLOGY | Facility: CLINIC | Age: 53
End: 2018-05-16
Attending: INTERNAL MEDICINE
Payer: COMMERCIAL

## 2018-05-16 VITALS
WEIGHT: 272.6 LBS | DIASTOLIC BLOOD PRESSURE: 85 MMHG | OXYGEN SATURATION: 94 % | HEIGHT: 74 IN | BODY MASS INDEX: 34.98 KG/M2 | SYSTOLIC BLOOD PRESSURE: 148 MMHG | HEART RATE: 110 BPM | TEMPERATURE: 97.8 F | RESPIRATION RATE: 18 BRPM

## 2018-05-16 DIAGNOSIS — C82.80 OTHER TYPE OF FOLLICULAR LYMPHOMA, UNSPECIFIED BODY REGION (H): Primary | ICD-10-CM

## 2018-05-16 PROCEDURE — G0463 HOSPITAL OUTPT CLINIC VISIT: HCPCS | Mod: ZF

## 2018-05-16 PROCEDURE — 99213 OFFICE O/P EST LOW 20 MIN: CPT | Mod: ZP | Performed by: INTERNAL MEDICINE

## 2018-05-16 ASSESSMENT — PAIN SCALES - GENERAL: PAINLEVEL: NO PAIN (0)

## 2018-05-16 NOTE — NURSING NOTE
"Oncology Rooming Note    May 16, 2018 3:20 PM   Julien Lee is a 52 year old male who presents for:    Chief Complaint   Patient presents with     Oncology Clinic Visit     Return visit related to NHL     Initial Vitals: /85 (BP Location: Left arm, Patient Position: Sitting, Cuff Size: Adult Large)  Pulse 110  Temp 97.8  F (36.6  C) (Oral)  Resp 18  Ht 1.874 m (6' 1.78\")  Wt 123.7 kg (272 lb 9.6 oz)  SpO2 94%  BMI 35.21 kg/m2 Estimated body mass index is 35.21 kg/(m^2) as calculated from the following:    Height as of this encounter: 1.874 m (6' 1.78\").    Weight as of this encounter: 123.7 kg (272 lb 9.6 oz). Body surface area is 2.54 meters squared.  No Pain (0) Comment: Data Unavailable   No LMP for male patient.  Allergies reviewed: Yes  Medications reviewed: Yes    Medications: Medication refills not needed today.  Pharmacy name entered into [employer]:    [id number] [place], [place] - [address] [place] [address] PHARMACY [place], [place] - [address] [facility] [phone]    Clinical concerns: No new concerns. Provider was notified.    10 minutes for nursing intake (face to face time)     Ellen Yung LPN            "

## 2018-05-16 NOTE — LETTER
5/16/2018      RE: Julien Lee  [address] [facility] [facility] [address]       ONCOLOGY SUMMARY:  Julien Lee is a 52-year-old patient with a history of follicular lymphoma diagnosed originally in 07/2002.  He was considered stage DERIK with grade 1/3 follicular lymphoma.  Disease was noted in the lymph nodes within the abdomen and inguinal regions and interstitial involvement in the bone marrow.  Over the years, he has had disease at other sites, including above the diaphragm, neck, axillae, etc.  He has received multiple treatments including oral chlorambucil (2002- 2003), Rituximab (01-02/2008), chlorambucil again in 2008 for pleural effusion, R-CVP x6 cycles from 12/2009-03/2010, radiation (2660 cGy) to the right neck in 06-07/2010, and most recently 6 cycles of rituximab plus bendamustine from in 10/2014 - 03/2015.  He has not required therapy for the past 3 years.       When Mr. Lee was in clinic on 11/15/2017, he was tachycardic.  EKG was negative.   He was not aware of the tachycardia. Subsequently, TSH was 2.45.         INTERVAL HISTORY:  I last saw the patient on 02/14/2018.  Since that visit, Mr. Lee has done very well.  No apparent episodes of tachycardia, palpitations orother new symptoms.  Also, he has now identified a Primary Care Provider and will be making an appointment in the near future.      Relative to the lymphoma, no fevers, night sweats or weight loss, new adenopathy.      10-POINT REVIEW OF SYSTEMS:  Otherwise negative.       MEDICATIONS:  Occasional ibuprofen.      ALLERGIES:  None reported.      PHYSICAL EXAMINATION:   VITAL SIGNS:  Weight 123.7 kg (compared with 116.7 kg in February, 114.8 kg in November), blood pressure 148/85, pulse  and regular, respirations 18, temperature 97.8.  O2 saturation:  94% on room air.   HEENT:  No icterus.  Oropharynx:  No masses.  Mucosa:  Moist.  No lesions.   NECK:  No cervical/supraclavicular adenopathy.   CHEST:  Clear to  auscultation.   AXILLAE:  No nodes.   HEART:  Regular rhythm.  Heart rate on examination is approximately 90.  When roomed, he was tachycardic at 110.  No murmur, gallop.   ABDOMEN:  No tenderness.  Soft.  No detectable organomegaly or mass.  Bowel sounds present.  No inguinal nodes.   EXTREMITIES:  No lower extremity edema.  No epitrochlear nodes.      LABORATORY:  None obtained.      ASSESSMENT AND PLAN:  Follicular lymphoma (grade 1), stage DERIK:  Mr. Lee is doing very well now as he approaches 16 years from diagnosis, and he is over 3 years since his most recent chemotherapy with rituximab plus bendamustine.  No evidence for disease progression.      Return in approximately 4 months with laboratory studies.  Consider surveillance CT scan in the next year-1 1/2 years, or as circumstances dictate.     Patient encouraged to make initial appointment with new primary care physician. I indicated we would provide any information requested.    Von Zaidi MD  Professor of Medicine  Oncology  [facility]  Office: [phone]  Clinic Fax: [phone]       cc:   [place] [address]           Von Zaidi MD

## 2018-05-16 NOTE — PROGRESS NOTES
ONCOLOGY SUMMARY:  Julien Lee is a 52-year-old patient with a history of follicular lymphoma diagnosed originally in 07/2002.  He was considered stage DERIK with grade 1/3 follicular lymphoma.  Disease was noted in the lymph nodes within the abdomen and inguinal regions and interstitial involvement in the bone marrow.  Over the years, he has had disease at other sites, including above the diaphragm, neck, axillae, etc.  He has received multiple treatments including oral chlorambucil (2002- 2003), Rituximab (01-02/2008), chlorambucil again in 2008 for pleural effusion, R-CVP x6 cycles from 12/2009-03/2010, radiation (2660 cGy) to the right neck in 06-07/2010, and most recently 6 cycles of rituximab plus bendamustine from in 10/2014 - 03/2015.  He has not required therapy for the past 3 years.       When Mr. Lee was in clinic on 11/15/2017, he was tachycardic.  EKG was negative.  He was not aware of the tachycardia. Subsequently, TSH was 2.45.         INTERVAL HISTORY:  I last saw the patient on 02/14/2018.  Since that visit, Mr. Lee has done very well.  No apparent episodes of tachycardia, palpitations orother new symptoms.  Also, he has now identified a Primary Care Provider and will be making an appointment in the near future.      Relative to the lymphoma, no fevers, night sweats or weight loss, new adenopathy.      10-POINT REVIEW OF SYSTEMS:  Otherwise negative.       MEDICATIONS:  Occasional ibuprofen.      ALLERGIES:  None reported.      PHYSICAL EXAMINATION:   VITAL SIGNS:  Weight 123.7 kg (compared with 116.7 kg in February, 114.8 kg in November), blood pressure 148/85, pulse  and regular, respirations 18, temperature 97.8.  O2 saturation:  94% on room air.   HEENT:  No icterus.  Oropharynx:  No masses.  Mucosa:  Moist.  No lesions.   NECK:  No cervical/supraclavicular adenopathy.   CHEST:  Clear to auscultation.   AXILLAE:  No nodes.   HEART:  Regular rhythm.  Heart rate on examination is  approximately 90.  When roomed, he was tachycardic at 110.  No murmur, gallop.   ABDOMEN:  No tenderness.  Soft.  No detectable organomegaly or mass.  Bowel sounds present.  No inguinal nodes.   EXTREMITIES:  No lower extremity edema.  No epitrochlear nodes.      LABORATORY:  None obtained.      ASSESSMENT AND PLAN:  Follicular lymphoma (grade 1), stage DERIK:  Mr. Lee is doing very well now as he approaches 16 years from diagnosis, and he is over 3 years since his most recent chemotherapy with rituximab plus bendamustine.  No evidence for disease progression.      Return in approximately 4 months with laboratory studies.  Consider surveillance CT scan in the next year-1 1/2 years, or as circumstances dictate.     Patient encouraged to make initial appointment with new primary care physician. I indicated we would provide any information requested.    Von Zaidi MD  Professor of Medicine  Oncology  [facility]  Office: [phone]  Clinic Fax: [phone]       cc:   [place] [address]

## 2018-05-16 NOTE — MR AVS SNAPSHOT
After Visit Summary   5/16/2018    Julien Lee    MRN: [id number]           Patient Information     Date Of Birth          1965        Visit Information        Provider Department      5/16/2018 3:30 PM Von Zaidi MD [facility]        Today's Diagnoses     Other type of follicular lymphoma, unspecified body region (H)    -  1       Follow-ups after your visit        Follow-up notes from your care team     Return in about 4 months (around 9/16/2018) for Physical Exam, Lab Work.      Your next 10 appointments already scheduled     Sep 19, 2018 11:00 AM CDT   [facility] Lab Draw with  [employer] LAB DRAW   [facility]    [address] [place] [address]  [address]  [facility] [address] [phone]            Sep 19, 2018 11:30 AM CDT   (Arrive by 11:15 AM)   Return Visit with Von Zaidi MD   [facility] ([facility]    [address] [place] [address]  [address]  [facility] [address] [phone]              Who to contact     If you have questions or need follow up information about today's clinic visit or your schedule please contact [facility] directly at [phone].  Normal or non-critical lab and imaging results will be communicated to you by MyChart, letter or phone within 4 business days after the clinic has received the results. If you do not hear from us within 7 days, please contact the clinic through [employer] or phone. If you have a critical or abnormal lab result, we will notify you by phone as soon as possible.  Submit refill requests through [employer] or call your pharmacy and they will forward the refill request to us. Please allow 3 business days for your refill to be completed.          Additional Information About Your Visit        [employer] Information     [employer] gives you secure access to your electronic health record. If you see a primary  "care provider, you can also send messages to your care team and make appointments. If you have questions, please call your primary care clinic.  If you do not have a primary care provider, please call [phone] and they will assist you.        Care EveryWhere ID     This is your Care EveryWhere ID. This could be used by other organizations to access your [place] medical records  [id number]        Your Vitals Were     Pulse Temperature Respirations Height Pulse Oximetry BMI (Body Mass Index)    110 97.8  F (36.6  C) (Oral) 18 1.874 m (6' 1.78\") 94% 35.21 kg/m2       Blood Pressure from Last 3 Encounters:   05/16/18 148/85   02/14/18 127/80   11/15/17 149/88    Weight from Last 3 Encounters:   05/16/18 123.7 kg (272 lb 9.6 oz)   02/14/18 116.7 kg (257 lb 3.2 oz)   11/15/17 114.8 kg (253 lb)               Primary Care Provider Office Phone # Fax #    Chuck LAYTON Cabrera [phone]       [facility] [address] [address]        Equal Access to Services     CAREN TURNER AH: Hadii aad ku hadasho Soomaali, waaxda luqadaha, qaybta kaalmada adeegyada, stephanie reddyn yordy singh lalenin ah. So [facility] [phone].    ATENCIÓN: Si habla español, tiene a rivera disposición servicios gratuitos de asistencia lingüística. [facility] [phone].    We comply with applicable federal civil rights laws and [place] laws. We do not discriminate on the basis of race, color, national origin, age, disability, sex, sexual orientation, or gender identity.            Thank you!     Thank you for choosing [facility]  for your care. Our goal is always to provide you with excellent care. Hearing back from our patients is one way we can continue to improve our services. Please take a few minutes to complete the written survey that you may receive in the mail after your visit with us. Thank you!             Your Updated Medication List - Protect others around you: Learn how to safely " use, store and throw away your medicines at www.disposemymeds.org.          This list is accurate as of 5/16/18 11:59 PM.  Always use your most recent med list.                   Brand Name Dispense Instructions for use Diagnosis    ibuprofen 200 MG tablet    ADVIL/MOTRIN     Take 200 mg by mouth as needed    Tachycardia, Other type of follicular lymphoma, unspecified body region (H)

## 2018-09-19 ENCOUNTER — ONCOLOGY VISIT (OUTPATIENT)
Dept: ONCOLOGY | Facility: CLINIC | Age: 53
End: 2018-09-19
Attending: INTERNAL MEDICINE
Payer: COMMERCIAL

## 2018-09-19 ENCOUNTER — APPOINTMENT (OUTPATIENT)
Dept: LAB | Facility: CLINIC | Age: 53
End: 2018-09-19
Attending: INTERNAL MEDICINE
Payer: COMMERCIAL

## 2018-09-19 VITALS
TEMPERATURE: 98.2 F | BODY MASS INDEX: 35.14 KG/M2 | OXYGEN SATURATION: 96 % | HEART RATE: 115 BPM | DIASTOLIC BLOOD PRESSURE: 78 MMHG | SYSTOLIC BLOOD PRESSURE: 131 MMHG | HEIGHT: 74 IN | WEIGHT: 273.8 LBS | RESPIRATION RATE: 16 BRPM

## 2018-09-19 DIAGNOSIS — C82.80 OTHER TYPE OF FOLLICULAR LYMPHOMA, UNSPECIFIED BODY REGION (H): ICD-10-CM

## 2018-09-19 LAB
BASOPHILS # BLD AUTO: 0.1 10E9/L (ref 0–0.2)
BASOPHILS NFR BLD AUTO: 0.8 %
DIFFERENTIAL METHOD BLD: NORMAL
EOSINOPHIL # BLD AUTO: 0.1 10E9/L (ref 0–0.7)
EOSINOPHIL NFR BLD AUTO: 1.8 %
ERYTHROCYTE [DISTWIDTH] IN BLOOD BY AUTOMATED COUNT: 12.4 % (ref 10–15)
HCT VFR BLD AUTO: 44.4 % (ref 40–53)
HGB BLD-MCNC: 15.6 G/DL (ref 13.3–17.7)
IMM GRANULOCYTES # BLD: 0.1 10E9/L (ref 0–0.4)
IMM GRANULOCYTES NFR BLD: 0.9 %
LYMPHOCYTES # BLD AUTO: 1.7 10E9/L (ref 0.8–5.3)
LYMPHOCYTES NFR BLD AUTO: 25.5 %
MCH RBC QN AUTO: 32.2 PG (ref 26.5–33)
MCHC RBC AUTO-ENTMCNC: 35.1 G/DL (ref 31.5–36.5)
MCV RBC AUTO: 92 FL (ref 78–100)
MONOCYTES # BLD AUTO: 0.8 10E9/L (ref 0–1.3)
MONOCYTES NFR BLD AUTO: 11.9 %
NEUTROPHILS # BLD AUTO: 3.9 10E9/L (ref 1.6–8.3)
NEUTROPHILS NFR BLD AUTO: 59.1 %
NRBC # BLD AUTO: 0 10*3/UL
NRBC BLD AUTO-RTO: 0 /100
PLATELET # BLD AUTO: 181 10E9/L (ref 150–450)
RBC # BLD AUTO: 4.84 10E12/L (ref 4.4–5.9)
WBC # BLD AUTO: 6.6 10E9/L (ref 4–11)

## 2018-09-19 PROCEDURE — 36415 COLL VENOUS BLD VENIPUNCTURE: CPT

## 2018-09-19 PROCEDURE — G0463 HOSPITAL OUTPT CLINIC VISIT: HCPCS | Mod: ZF

## 2018-09-19 PROCEDURE — 80053 COMPREHEN METABOLIC PANEL: CPT | Performed by: INTERNAL MEDICINE

## 2018-09-19 PROCEDURE — 85025 COMPLETE CBC W/AUTO DIFF WBC: CPT | Performed by: INTERNAL MEDICINE

## 2018-09-19 PROCEDURE — 99213 OFFICE O/P EST LOW 20 MIN: CPT | Mod: ZP | Performed by: INTERNAL MEDICINE

## 2018-09-19 ASSESSMENT — PAIN SCALES - GENERAL: PAINLEVEL: NO PAIN (0)

## 2018-09-19 NOTE — NURSING NOTE
Chief Complaint   Patient presents with     Blood Draw     Labs drawn from vpt by RN. Vs taken and pt checked in for appt.     Labs collected from venipuncture by RN. Vitals taken. Checked in for appointment(s).    Patricia Noland RN

## 2018-09-19 NOTE — NURSING NOTE
"Oncology Rooming Note    September 19, 2018 11:56 AM   Julien Lee is a 52 year old male who presents for:    Chief Complaint   Patient presents with     Blood Draw     Labs drawn from vpt by RN. Vs taken and pt checked in for appt.     Oncology Clinic Visit     F/U Follicular Lymphoma     Initial Vitals: /78 (BP Location: Right arm, Cuff Size: Adult Regular)  Pulse 115  Temp 98.2  F (36.8  C) (Oral)  Resp 16  Ht 1.874 m (6' 1.78\")  Wt 124.2 kg (273 lb 12.8 oz)  SpO2 96%  BMI 35.36 kg/m2 Estimated body mass index is 35.36 kg/(m^2) as calculated from the following:    Height as of this encounter: 1.874 m (6' 1.78\").    Weight as of this encounter: 124.2 kg (273 lb 12.8 oz). Body surface area is 2.54 meters squared.  No Pain (0) Comment: Data Unavailable   No LMP for male patient.  Allergies reviewed: Yes  Medications reviewed: Yes    Medications: Medication refills not needed today.  Pharmacy name entered into [employer]:    [id number] [place] [facility] - [place], [place] - [facility] [place], [place] - [address]    Clinical concerns: None, Dr Zaidi was NOT notified.    10 minutes for nursing intake (face to face time)     NAVI MAYO LPN            "

## 2018-09-19 NOTE — LETTER
9/19/2018      RE: Julien Lee  [address] [address]       ONCOLOGY SUMMARY: Julien Lee is a 52-year-old patient with a history of follicular lymphoma diagnosed originally in 07/2002.  He was considered stage DERIK with grade 1/3 follicular lymphoma.  Disease was noted in the lymph nodes within the abdomen and inguinal regions and interstitial involvement in the bone marrow.  Over the years, he has had disease at other sites, including above the diaphragm, neck, axillae, etc.  He has received multiple treatments including oral chlorambucil (2002- 2003), Rituximab (01-02/2008), chlorambucil again in 2008 for pleural effusion, R-CVP x6 cycles from 12/2009-03/2010, radiation (2660 cGy) to the right neck in 06-07/2010, and most recently 6 cycles of rituximab plus bendamustine from in 10/2014 - 03/2015.  He has not required therapy for the past 3 years.      INTERVAL HISTORY:  Mr. Lee was last seen in my clinic on 05/16/2019.  He states that he has done relatively well since that time.  Although he may have the beginnings of an upper respiratory tract infection at this time, he is uncertain, but otherwise has not been sick during the past 4 months.  No fevers, night sweats or weight loss.  No apparent adenopathy.  General health is good.  He does note that he has put on approximately 30 pounds since last February.  This may limit his ability to be active.  Some dyspnea with climbing stairs.  No significant episodes of tachycardia or arrhythmia.      TEN-POINT REVIEW OF SYSTEMS:  Otherwise negative.      MEDICATIONS:  None.      ALLERGIES:  None reported.      PHYSICAL EXAMINATION:   VITAL SIGNS:  Weight 124.2 kg compared with 116.7 kg in 02/2018, blood pressure 131/78, pulse 115 (90), respirations 16, temperature 98.2.  O2 saturation:  96% on room air.   HEENT:  Anicteric.  No conjunctival injection.  Oropharynx:  No masses.  Mucosa: Moist without lesion.   NECK:  No  cervical/supraclavicular adenopathy.   CHEST:  Clear to auscultation.   HEART:  Regular rhythm without murmur or gallop.   ABDOMEN:  Soft, nontender.  Liver appears to be at the right costal margin.  Spleen is not palpable.  No masses.  No tenderness.  Bowel sounds active.  No inguinal/femoral nodes.   EXTREMITIES:  No lower extremity edema.   SKIN:  No rashes.      LABORATORY:     Hemoglobin 15.6 g% with 6600 WBCs (normal differential) and 181,000 platelets.      ASSESSMENT AND PLAN:  Follicular lymphoma (grade 1), stage DERIK:  The patient continues to do well and is now over 16 years from diagnosis.  Most recent therapy ended in 03/2015, approximately 3 1/2 years ago.  No evidence of disease progression.  No significant residua from treatment.      Mr. Lee will return to clinic in approximately 3-4 months with laboratory studies and surveillance CT scan.        Encouraged to exercise more and to lose weight.  Recommended flu shot, which he plans to obtain at an outside facility.    Von Zaidi MD  Professor of Medicine  Oncology  [facility]  Office: [phone]  Clinic Fax: [phone]        cc:   MD Von Baker MD

## 2018-09-19 NOTE — MR AVS SNAPSHOT
After Visit Summary   9/19/2018    Julien Lee    MRN: [id number]           Patient Information     Date Of Birth          1965        Visit Information        Provider Department      9/19/2018 11:30 AM Von Zaidi MD [facility]        Today's Diagnoses     Other type of follicular lymphoma, unspecified body region (H)          Care Instructions    Preventive Care:            Follow-ups after your visit        Follow-up notes from your care team     Return in about 4 months (around 1/19/2019) for Physical Exam, Lab Work, CT or PET/CT.      Your next 10 appointments already scheduled     Jan 16, 2019 10:20 AM CST   CT CHEST/ABDOMEN/PELVIS W CONTRAST with UCCT2   [facility] [place] [facility] ([facility])    [address] [address] [phone]           How do I prepare for my exam? (Food and drink instructions) To prepare: Do not eat or drink for 2 hours before your exam. If you need to take medicine, you may take it with small sips of water. (We may ask you to take liquid medicine as well.)  How do I prepare for my exam? (Other instructions) Please arrive 30 minutes early for your CT.  Once in the department you might be asked to drink water 15-20 minutes prior to your exam.  If indicated you may be asked to drink an oral contrast in advance of your CT.  If this is the case, the imaging team will let you know or be in contact with you prior to your appointment  Patients over 70 or patients with diabetes or kidney problems: If you haven t had a blood test (creatinine test) within the last 30 days, the Cardiologist/Radiologist may require you to get this test prior to your exam.  If you have diabetes:  Continue to take your metformin medication on the day of your exam  What should I wear: Please wear loose clothing, such as a sweat suit or jogging clothes. Avoid snaps, zippers and other metal. We may ask  you to undress and put on a hospital gown.  How long does the exam take: Most scans take less than 20 minutes.  What should I bring: Please bring any scans or X-rays taken at other hospitals, if similar tests were done. Also bring a list of your medicines, including vitamins, minerals and over-the-counter drugs. It is safest to leave personal items at home.  Do I need a : No  is needed.  What do I need to tell my doctor? Be sure to tell your doctor: * If you have any allergies. * If there s any chance you are pregnant. * If you are breastfeeding.  What should I do after the exam: No restrictions, You may resume normal activities.  What is this test: A CT (computed tomography) scan is a series of pictures that allows us to look inside your body. The scanner creates images of the body in cross sections, much like slices of bread. This helps us see any problems more clearly. You may receive contrast (X-ray dye) before or during your scan. You will be asked to drink the contrast.  Who should I call with questions: If you have any questions, please call the Imaging Department where you will have your exam. Directions, parking instructions, and other information is available on our website, [email or web address].            Jan 16, 2019  1:00 PM CST   [facility] Lab Draw with  [facility] [facility] [employer] LAB DRAW   [facility] Lab Draw ([facility])    [address] [place] [address]  [address] [place] [address] [phone]            Jan 16, 2019  1:30 PM CST   (Arrive by 1:15 PM)   Return Visit with Von Zaidi MD   [facility] ([facility])    [address] [place] [address]  [address] [place] [address]   [phone]              Future tests that were ordered for you today     Open Future Orders        Priority Expected Expires Ordered    CT Chest/Abdomen/Pelvis w Contrast Routine 1/19/2019 9/19/2019 9/19/2018    Comprehensive metabolic panel Routine  "1/19/2019 9/19/2019 9/19/2018    Lactate Dehydrogenase Routine 1/19/2019 9/19/2019 9/19/2018    Uric acid Routine 1/19/2019 9/19/2019 9/19/2018    Protein electrophoresis Routine 1/19/2019 9/19/2019 9/19/2018    *CBC with platelets differential Routine 1/19/2019 9/19/2019 9/19/2018            Who to contact     If you have questions or need follow up information about today's clinic visit or your schedule please contact [facility] directly at [phone].  Normal or non-critical lab and imaging results will be communicated to you by [employer], letter or phone within 4 business days after the clinic has received the results. If you do not hear from us within 7 days, please contact the clinic through [employer] or phone. If you have a critical or abnormal lab result, we will notify you by phone as soon as possible.  Submit refill requests through [employer] or call your pharmacy and they will forward the refill request to us. Please allow 3 business days for your refill to be completed.          Additional Information About Your Visit        [employer] Information     [employer] gives you secure access to your electronic health record. If you see a primary care provider, you can also send messages to your care team and make appointments. If you have questions, please call your primary care clinic.  If you do not have a primary care provider, please call [phone] and they will assist you.        Care EveryWhere ID     This is your Care EveryWhere ID. This could be used by other organizations to access your [place] medical records  [id number]        Your Vitals Were     Pulse Temperature Respirations Height Pulse Oximetry BMI (Body Mass Index)    115 98.2  F (36.8  C) (Oral) 16 1.874 m (6' 1.78\") 96% 35.36 kg/m2       Blood Pressure from Last 3 Encounters:   09/19/18 131/78   05/16/18 148/85   02/14/18 127/80    Weight from Last 3 Encounters:   09/19/18 124.2 kg (273 lb 12.8 oz)   05/16/18 123.7 kg (272 lb " 9.6 oz)   02/14/18 116.7 kg (257 lb 3.2 oz)              We Performed the Following     *CBC with platelets differential     Comprehensive metabolic panel     Lactate Dehydrogenase     Uric acid          Today's Medication Changes          These changes are accurate as of 9/19/18 12:26 PM.  If you have any questions, ask your nurse or doctor.               Stop taking these medicines if you haven't already. Please contact your care team if you have questions.     ibuprofen 200 MG tablet   Commonly known as:  ADVIL/MOTRIN   Stopped by:  Von Zaidi MD                    Primary Care Provider Office Phone # Fax #    Chuck Cabrera [phone]       [facility] [address] [address]        Equal Access to Services     CAREN TURNER : [place], wagarrett bravo, qaybta kaalmada swetha, stephanie moura . So [facility] [phone].    ATENCIÓN: Si habla español, tiene a rivera disposición servicios gratuitos de asistencia lingüística. Llame al [phone].    We comply with applicable federal civil rights laws and [place] laws. We do not discriminate on the basis of race, color, national origin, age, disability, sex, sexual orientation, or gender identity.            Thank you!     Thank you for choosing [facility]  for your care. Our goal is always to provide you with excellent care. Hearing back from our patients is one way we can continue to improve our services. Please take a few minutes to complete the written survey that you may receive in the mail after your visit with us. Thank you!             Your Updated Medication List - Protect others around you: Learn how to safely use, store and throw away your medicines at www.disposemymeds.org.      Notice  As of 9/19/2018 12:26 PM    You have not been prescribed any medications.

## 2018-09-19 NOTE — PROGRESS NOTES
ONCOLOGY SUMMARY: Julien Lee is a 52-year-old patient with a history of follicular lymphoma diagnosed originally in 07/2002.  He was considered stage DERIK with grade 1/3 follicular lymphoma.  Disease was noted in the lymph nodes within the abdomen and inguinal regions and interstitial involvement in the bone marrow.  Over the years, he has had disease at other sites, including above the diaphragm, neck, axillae, etc.  He has received multiple treatments including oral chlorambucil (2002- 2003), Rituximab (01-02/2008), chlorambucil again in 2008 for pleural effusion, R-CVP x6 cycles from 12/2009-03/2010, radiation (2660 cGy) to the right neck in 06-07/2010, and most recently 6 cycles of rituximab plus bendamustine from in 10/2014 - 03/2015.  He has not required therapy for the past 3 years.      INTERVAL HISTORY:  Mr. Lee was last seen in my clinic on 05/16/2019.  He states that he has done relatively well since that time.  Although he may have the beginnings of an upper respiratory tract infection at this time, he is uncertain, but otherwise has not been sick during the past 4 months.  No fevers, night sweats or weight loss.  No apparent adenopathy.  General health is good.  He does note that he has put on approximately 30 pounds since last February.  This may limit his ability to be active.  Some dyspnea with climbing stairs.  No significant episodes of tachycardia or arrhythmia.      TEN-POINT REVIEW OF SYSTEMS:  Otherwise negative.      MEDICATIONS:  None.      ALLERGIES:  None reported.      PHYSICAL EXAMINATION:   VITAL SIGNS:  Weight 124.2 kg compared with 116.7 kg in 02/2018, blood pressure 131/78, pulse 115 (90), respirations 16, temperature 98.2.  O2 saturation:  96% on room air.   HEENT:  Anicteric.  No conjunctival injection.  Oropharynx:  No masses.  Mucosa: Moist without lesion.   NECK:  No cervical/supraclavicular adenopathy.   CHEST:  Clear to auscultation.   HEART:  Regular rhythm without  murmur or gallop.   ABDOMEN:  Soft, nontender.  Liver appears to be at the right costal margin.  Spleen is not palpable.  No masses.  No tenderness.  Bowel sounds active.  No inguinal/femoral nodes.   EXTREMITIES:  No lower extremity edema.   SKIN:  No rashes.      LABORATORY:     Hemoglobin 15.6 g% with 6600 WBCs (normal differential) and 181,000 platelets.      ASSESSMENT AND PLAN:  Follicular lymphoma (grade 1), stage DERIK:  The patient continues to do well and is now over 16 years from diagnosis.  Most recent therapy ended in 03/2015, approximately 3 1/2 years ago.  No evidence of disease progression.  No significant residua from treatment.      Mr. Lee will return to clinic in approximately 3-4 months with laboratory studies and surveillance CT scan.        Encouraged to exercise more and to lose weight.  Recommended flu shot, which he plans to obtain at an outside facility.    Von Zaidi MD  Professor of Medicine  Oncology  [facility]  Office: [phone]  Clinic Fax: [phone]        cc:   Chuck Cabrera MD

## 2019-01-16 ENCOUNTER — APPOINTMENT (OUTPATIENT)
Dept: LAB | Facility: CLINIC | Age: 54
End: 2019-01-16
Attending: INTERNAL MEDICINE

## 2019-01-16 ENCOUNTER — TRANSFERRED RECORDS (OUTPATIENT)
Dept: HEALTH INFORMATION MANAGEMENT | Facility: CLINIC | Age: 54
End: 2019-01-16

## 2019-01-16 ENCOUNTER — ONCOLOGY VISIT (OUTPATIENT)
Dept: ONCOLOGY | Facility: CLINIC | Age: 54
End: 2019-01-16
Attending: INTERNAL MEDICINE

## 2019-01-16 ENCOUNTER — ANCILLARY PROCEDURE (OUTPATIENT)
Dept: CT IMAGING | Facility: CLINIC | Age: 54
End: 2019-01-16
Attending: INTERNAL MEDICINE
Payer: COMMERCIAL

## 2019-01-16 VITALS
HEIGHT: 73 IN | BODY MASS INDEX: 36.11 KG/M2 | WEIGHT: 272.5 LBS | DIASTOLIC BLOOD PRESSURE: 80 MMHG | TEMPERATURE: 97.9 F | HEART RATE: 113 BPM | OXYGEN SATURATION: 98 % | SYSTOLIC BLOOD PRESSURE: 126 MMHG

## 2019-01-16 DIAGNOSIS — C82.80 OTHER TYPE OF FOLLICULAR LYMPHOMA, UNSPECIFIED BODY REGION (H): ICD-10-CM

## 2019-01-16 LAB
ALBUMIN SERPL-MCNC: 3.8 G/DL (ref 3.4–5)
ALP SERPL-CCNC: 102 U/L (ref 40–150)
ALT SERPL W P-5'-P-CCNC: 38 U/L (ref 0–70)
ANION GAP SERPL CALCULATED.3IONS-SCNC: 7 MMOL/L (ref 3–14)
AST SERPL W P-5'-P-CCNC: 25 U/L (ref 0–45)
BASOPHILS # BLD AUTO: 0.1 10E9/L (ref 0–0.2)
BASOPHILS NFR BLD AUTO: 0.7 %
BILIRUB SERPL-MCNC: 0.4 MG/DL (ref 0.2–1.3)
BUN SERPL-MCNC: 13 MG/DL (ref 7–30)
CALCIUM SERPL-MCNC: 8.8 MG/DL (ref 8.5–10.1)
CHLORIDE SERPL-SCNC: 106 MMOL/L (ref 94–109)
CO2 SERPL-SCNC: 24 MMOL/L (ref 20–32)
CREAT SERPL-MCNC: 0.96 MG/DL (ref 0.66–1.25)
DIFFERENTIAL METHOD BLD: ABNORMAL
EOSINOPHIL # BLD AUTO: 0.1 10E9/L (ref 0–0.7)
EOSINOPHIL NFR BLD AUTO: 1.9 %
ERYTHROCYTE [DISTWIDTH] IN BLOOD BY AUTOMATED COUNT: 12.2 % (ref 10–15)
GFR SERPL CREATININE-BSD FRML MDRD: 90 ML/MIN/{1.73_M2}
GLUCOSE SERPL-MCNC: 101 MG/DL (ref 70–99)
HCT VFR BLD AUTO: 42.3 % (ref 40–53)
HGB BLD-MCNC: 15.6 G/DL (ref 13.3–17.7)
IMM GRANULOCYTES # BLD: 0.1 10E9/L (ref 0–0.4)
IMM GRANULOCYTES NFR BLD: 0.9 %
LDH SERPL L TO P-CCNC: 280 U/L (ref 85–227)
LYMPHOCYTES # BLD AUTO: 1.8 10E9/L (ref 0.8–5.3)
LYMPHOCYTES NFR BLD AUTO: 25.9 %
MCH RBC QN AUTO: 31.9 PG (ref 26.5–33)
MCHC RBC AUTO-ENTMCNC: 36.9 G/DL (ref 31.5–36.5)
MCV RBC AUTO: 87 FL (ref 78–100)
MONOCYTES # BLD AUTO: 0.7 10E9/L (ref 0–1.3)
MONOCYTES NFR BLD AUTO: 10.6 %
NEUTROPHILS # BLD AUTO: 4.2 10E9/L (ref 1.6–8.3)
NEUTROPHILS NFR BLD AUTO: 60 %
NRBC # BLD AUTO: 0 10*3/UL
NRBC BLD AUTO-RTO: 0 /100
PLATELET # BLD AUTO: 193 10E9/L (ref 150–450)
POTASSIUM SERPL-SCNC: 4 MMOL/L (ref 3.4–5.3)
PROT SERPL-MCNC: 7 G/DL (ref 6.8–8.8)
RBC # BLD AUTO: 4.89 10E12/L (ref 4.4–5.9)
SODIUM SERPL-SCNC: 138 MMOL/L (ref 133–144)
URATE SERPL-MCNC: 7.3 MG/DL (ref 3.5–7.2)
WBC # BLD AUTO: 7 10E9/L (ref 4–11)

## 2019-01-16 PROCEDURE — 00000402 ZZHCL STATISTIC TOTAL PROTEIN: Performed by: INTERNAL MEDICINE

## 2019-01-16 PROCEDURE — G0008 ADMIN INFLUENZA VIRUS VAC: HCPCS

## 2019-01-16 PROCEDURE — 84550 ASSAY OF BLOOD/URIC ACID: CPT | Performed by: INTERNAL MEDICINE

## 2019-01-16 PROCEDURE — 85025 COMPLETE CBC W/AUTO DIFF WBC: CPT | Performed by: INTERNAL MEDICINE

## 2019-01-16 PROCEDURE — G0463 HOSPITAL OUTPT CLINIC VISIT: HCPCS | Mod: ZF

## 2019-01-16 PROCEDURE — 83615 LACTATE (LD) (LDH) ENZYME: CPT | Performed by: INTERNAL MEDICINE

## 2019-01-16 PROCEDURE — 99214 OFFICE O/P EST MOD 30 MIN: CPT | Mod: ZP | Performed by: INTERNAL MEDICINE

## 2019-01-16 PROCEDURE — 25000128 H RX IP 250 OP 636: Mod: ZF | Performed by: INTERNAL MEDICINE

## 2019-01-16 PROCEDURE — 84165 PROTEIN E-PHORESIS SERUM: CPT | Performed by: INTERNAL MEDICINE

## 2019-01-16 PROCEDURE — 90686 IIV4 VACC NO PRSV 0.5 ML IM: CPT | Mod: ZF | Performed by: INTERNAL MEDICINE

## 2019-01-16 PROCEDURE — 36592 COLLECT BLOOD FROM PICC: CPT

## 2019-01-16 PROCEDURE — 80053 COMPREHEN METABOLIC PANEL: CPT | Performed by: INTERNAL MEDICINE

## 2019-01-16 RX ORDER — IOPAMIDOL 755 MG/ML
135 INJECTION, SOLUTION INTRAVASCULAR ONCE
Status: COMPLETED | OUTPATIENT
Start: 2019-01-16 | End: 2019-01-16

## 2019-01-16 RX ADMIN — IOPAMIDOL 135 ML: 755 INJECTION, SOLUTION INTRAVASCULAR at 10:29

## 2019-01-16 RX ADMIN — INFLUENZA A VIRUS A/MICHIGAN/45/2015 X-275 (H1N1) ANTIGEN (FORMALDEHYDE INACTIVATED), INFLUENZA A VIRUS A/SINGAPORE/INFIMH-16-0019/2016 IVR-186 (H3N2) ANTIGEN (FORMALDEHYDE INACTIVATED), INFLUENZA B VIRUS B/PHUKET/3073/2013 ANTIGEN (FORMALDEHYDE INACTIVATED), AND INFLUENZA B VIRUS B/MARYLAND/15/2016 BX-69A ANTIGEN (FORMALDEHYDE INACTIVATED) 0.5 ML: 15; 15; 15; 15 INJECTION, SUSPENSION INTRAMUSCULAR at 14:35

## 2019-01-16 ASSESSMENT — MIFFLIN-ST. JEOR: SCORE: 2129.93

## 2019-01-16 ASSESSMENT — PAIN SCALES - GENERAL: PAINLEVEL: NO PAIN (0)

## 2019-01-16 NOTE — PROGRESS NOTES
ONCOLOGY SUMMARY (update): Julien Lee is a 53-year-old with a history of follicular lymphoma diagnosed originally in 2002.  He was considered stage DERIK with grade 1/3 follicular lymphoma.  Disease was noted in the lymph nodes within the abdomen and inguinal regions, plus interstitial involvement in the bone marrow.  Over the years, he has had disease at other sites, including above the diaphragm, neck, axillae, etc.  He has received multiple treatments including oral chlorambucil (- ), Rituximab (), chlorambucil again in  for a pleural effusion, R-CVP x 6 cycles from 2009-2010, radiation (2660 cGy) to the right neck in -2010 for a persistent local problem, and most recently 6 cycles of rituximab plus bendamustine from 10/2014 - 2015.  He has not required additional therapy over the past 3 years.     Mr. Lee's wife  in 2011 of a brain tumor.  He has subsequently raised their 3 children as a single parent.     INTERVAL HISTORY:  Mr. Lee was in my clinic last on 2018.  At that time he reported that he had put on about 30 lbs since 2018. He has had no new issues since that time, including fevers, night sweats or weight loss.  He has had no known infections.  He has not received the influenza vaccine, but we will provide it today.     He also has not been successful in losing significant weight.     He feels some fullness in the right throat region.  Not a discrete node.  It was tender a few weeks ago, but that has resolved.  He still thinks he can feel an irregularity.  No dysphagia.     REVIEW OF SYSTEMS:  A 10-point review otherwise negative.      MEDICATIONS:  None.      ALLERGIES:  None reported.      PHYSICAL EXAMINATION:  Weight 121.2 kg (compared with 124.2 kg), blood pressure 126/80, pulse , respirations 16, temperature 97.9.  O2 saturation 98% on room air.   HEENT:  No icterus or conjunctival injection.  Oropharynx negative.  Mucosa moist  without plaque or ulceration.   NECK:  No cervical or supraclavicular adenopathy.  Slight asymmetry where patient identifies his area of concern in the right neck, but no discrete lesion felt. No thyroid nodules/masses.   CHEST:  Clear to auscultation.   AXILLAE:  Negative.   HEART:  Regular rhythm.  No murmur.   ABDOMEN:  Soft.  The liver is at the right costal margin.  The spleen is not palpable.  No masses.  No tenderness.  Bowel sounds present.  No inguinal/femoral nodes.   EXTREMITIES:  No lower extremity edema.  No epitrochlear nodes.   SKIN: No palpable subcutaneous masses. No changes in radiation field.     LABORATORY:   -Hemoglobin 15.6 grams percent with 7000 WBCs (60% neutrophils, 26% lymphocytes) and 193,000 platelets.   -Electrolytes, calcium, creatinine (0.96) - normal.   -Liver enzymes - normal.   (normal <227).   -Uric acid 7.3.  -Serum protein electrophoresis:  Albumin 4.3, gamma fraction 0.7, no monoclonal peak      Chest and abdominal CT scan -  1. Slightly enlarged lymph nodes in the mediastinum (change in size ~2 mm).  2. Resolved soft tissue density in the right upper abdomen but new left chest wall subcutaneous nodules  3. Decreased size of the inguinal and external iliac lymph nodes.  4. Stable to decreased periaortic and mesenteric adenopathy  5. Stable pulmonary nodules.      ASSESSMENT AND PLAN:  Follicular lymphoma (grade 1), stage DERIK - Patient is now 16-1/2 years following diagnosis and has not received therapy since 03/2015.  CT scan today shows modest lymph nodes, but mixed and no significant changes over the past year.  Also, no change in the small pulmonary nodules. Subcutaneous nodule not appreciated. LDH slightly elevated but no accompanying findings suggesting progressive disease. The patient voices no concerns about residua from prior therapies.      Return to clinic in approximately 4 months with laboratory studies.  Consider a surveillance CT scan in approximately 1  year or at any time there are new issues or concerns.      Weight stable to slightly decreased. TSH normal in 02/2018. Repeat on return. Encouraged to exercise, diet and lose weight.      Influenza vaccine provided in clinic.    Mr. Lee is aware of my planned departure from the [facility] this spring. Discussed that I will remain his Oncologist through the end of March 2019 and our plan for the subsequent transition of his oncology care to another provider.     Von Zaidi MD  Professor of Medicine  Oncology  [facility]  Office: [phone]  Clinic Fax: [phone]        cc:   Chuck Cabrera MD   [facility]   [address] [place] [address]

## 2019-01-16 NOTE — Clinical Note
1/16/2019       RE: Julien Lee  [address] [address]     Dear Colleague,    Thank you for referring your patient, Julien Lee, to the [facility]. Please see a copy of my visit note below.    No notes on file    Again, thank you for allowing me to participate in the care of your patient.      Sincerely,    Von Zaidi MD

## 2019-01-16 NOTE — NURSING NOTE
"Oncology Rooming Note    January 16, 2019 1:53 PM   Julien Lee is a 53 year old male who presents for:    Chief Complaint   Patient presents with     Blood Draw     labs drawn via PIV already in place by RN     Oncology Clinic Visit     NHL     Initial Vitals: /80 (BP Location: Left arm, Patient Position: Chair, Cuff Size: Adult Large)   Pulse 113   Temp 97.9  F (36.6  C) (Oral)   Ht 1.854 m (6' 1\")   Wt 123.6 kg (272 lb 8 oz)   SpO2 98%   BMI 35.95 kg/m   Estimated body mass index is 35.95 kg/m  as calculated from the following:    Height as of this encounter: 1.854 m (6' 1\").    Weight as of this encounter: 123.6 kg (272 lb 8 oz). Body surface area is 2.52 meters squared.  No Pain (0) Comment: Data Unavailable   No LMP for male patient.  Allergies reviewed: Yes  Medications reviewed: Yes    Medications: Medication refills not needed today.  Pharmacy name entered into [employer]:    [id number] [place] [facility] [place], [place] - [address] [address] PHARMACY [place], [place] - [address] [facility] [phone]    Clinical concerns: No New Concerns    5 minutes for nursing intake (face to face time)     ANDREW Nath      "

## 2019-01-16 NOTE — NURSING NOTE
"Injectable Influenza Immunization Documentation    1.  Has the patient received the information for the injectable influenza vaccine? YES     2. Is the patient 6 months of age or older? YES     3. Does the patient have any of the following contraindications?         Severe allergy to eggs? No     Severe allergic reaction to previous influenza vaccines? No   Severe allergy to latex? No       History of Guillain-[place] syndrome? No     Currently have a temperature greater than 100.4F? No        4.  Severely egg allergic patients should have flu vaccine eligibility assessed by an MD, RN, or pharmacist, and those who received flu vaccine should be observed for 15 min by an MD, RN, Pharmacist, Medical Technician, or member of clinic staff.\": NO    5. Latex-allergic patients should be given latex-free influenza vaccine No. Please reference the Vaccine latex table to determine if your clinic s product is latex-containing.       Vaccination given by ANDREW Byrd    Flu vaccine given in Right deltoid. Pt tolerated well. See MAR          "

## 2019-01-16 NOTE — LETTER
2019      RE: Julien Lee  [address] [facility] [address] [place] [address]       ONCOLOGY SUMMARY (update): Julien Lee is a 53-year-old with a history of follicular lymphoma diagnosed originally in 2002.  He was considered stage DERIK with grade 1/3 follicular lymphoma.  Disease was noted in the lymph nodes within the abdomen and inguinal regions, plus interstitial involvement in the bone marrow.  Over the years, he has had disease at other sites, including above the diaphragm, neck, axillae, etc.  He has received multiple treatments including oral chlorambucil (- ), Rituximab (), chlorambucil again in  for a pleural effusion, R-CVP x 6 cycles from 2009-2010, radiation (2660 cGy) to the right neck in -2010 for a persistent local problem, and most recently 6 cycles of rituximab plus bendamustine from 10/2014 - 2015.  He has not required additional therapy over the past 3 years.     Mr. Lee's wife  in 2011 of a brain tumor.  He has subsequently raised their 3 children as a single parent.     INTERVAL HISTORY:  Mr. Lee was in my clinic last on 2018.  At that time he reported that he had put on about 30 lbs since 2018. He has had no new issues since that time, including fevers, night sweats or weight loss.  He has had no known infections.  He has not received the influenza vaccine, but we will provide it today.     He also has not been successful in losing significant weight.     He feels some fullness in the right throat region.  Not a discrete node.  It was tender a few weeks ago, but that has resolved.  He still thinks he can feel an irregularity.  No dysphagia.     REVIEW OF SYSTEMS:  A 10-point review otherwise negative.      MEDICATIONS:  None.      ALLERGIES:  None reported.      PHYSICAL EXAMINATION:  Weight 121.2 kg (compared with 124.2 kg), blood pressure 126/80, pulse , respirations 16, temperature 97.9.  O2 saturation 98% on room  air.   HEENT:  No icterus or conjunctival injection.  Oropharynx negative.  Mucosa moist without plaque or ulceration.   NECK:  No cervical or supraclavicular adenopathy.  Slight asymmetry where patient identifies his area of concern in the right neck, but no discrete lesion felt. No thyroid nodules/masses.   CHEST:  Clear to auscultation.   AXILLAE:  Negative.   HEART:  Regular rhythm.  No murmur.   ABDOMEN:  Soft.  The liver is at the right costal margin.  The spleen is not palpable.  No masses.  No tenderness.  Bowel sounds present.  No inguinal/femoral nodes.   EXTREMITIES:  No lower extremity edema.  No epitrochlear nodes.   SKIN: No palpable subcutaneous masses. No changes in radiation field.     LABORATORY:   -Hemoglobin 15.6 grams percent with 7000 WBCs (60% neutrophils, 26% lymphocytes) and 193,000 platelets.   -Electrolytes, calcium, creatinine (0.96) - normal.   -Liver enzymes - normal.   (normal <227).   -Uric acid 7.3.  -Serum protein electrophoresis:  Albumin 4.3, gamma fraction 0.7, no monoclonal peak      Chest and abdominal CT scan -  1. Slightly enlarged lymph nodes in the mediastinum (change in size ~2 mm).  2. Resolved soft tissue density in the right upper abdomen but new left chest wall subcutaneous nodules  3. Decreased size of the inguinal and external iliac lymph nodes.  4. Stable to decreased periaortic and mesenteric adenopathy  5. Stable pulmonary nodules.      ASSESSMENT AND PLAN:  Follicular lymphoma (grade 1), stage DERIK - Patient is now 16-1/2 years following diagnosis and has not received therapy since 03/2015.  CT scan today shows modest lymph nodes, but mixed and no significant changes over the past year.  Also, no change in the small pulmonary nodules. Subcutaneous nodule not appreciated. LDH slightly elevated but no accompanying findings suggesting progressive disease. The patient voices no concerns about residua from prior therapies.      Return to clinic in approximately  4 months with laboratory studies.  Consider a surveillance CT scan in approximately 1 year or at any time there are new issues or concerns.      Weight stable to slightly decreased. TSH normal in 02/2018. Repeat on return. Encouraged to exercise, diet and lose weight.      Influenza vaccine provided in clinic.    Mr. Lee is aware of my planned departure from the [facility] this spring. Discussed that I will remain his Oncologist through the end of March 2019 and our plan for the subsequent transition of his oncology care to another provider.     Von Zaidi MD  Professor of Medicine  Oncology  [facility]  Office: [phone]  Clinic Fax: [phone]        cc:   [place] [address]           Von Zaidi MD

## 2019-01-16 NOTE — NURSING NOTE
Chief Complaint   Patient presents with     Blood Draw     labs drawn via PIV already in place by RN     /80 (BP Location: Left arm, Patient Position: Chair, Cuff Size: Adult Large)   Pulse 113   Temp 97.9  F (36.6  C) (Oral)   Wt 123.6 kg (272 lb 8 oz)   SpO2 98%   BMI 35.20 kg/m      PIV already in placed right antecub in lab for infusion and labs. Labs drawn and sent. PIV removed. Pt tolerated well.   Pt checked in for next appointment.    Martine Crandall RN

## 2019-01-16 NOTE — DISCHARGE INSTRUCTIONS

## 2019-01-17 LAB
ALBUMIN SERPL ELPH-MCNC: 4.3 G/DL (ref 3.7–5.1)
ALPHA1 GLOB SERPL ELPH-MCNC: 0.3 G/DL (ref 0.2–0.4)
ALPHA2 GLOB SERPL ELPH-MCNC: 0.8 G/DL (ref 0.5–0.9)
B-GLOBULIN SERPL ELPH-MCNC: 0.7 G/DL (ref 0.6–1)
GAMMA GLOB SERPL ELPH-MCNC: 0.7 G/DL (ref 0.7–1.6)
M PROTEIN SERPL ELPH-MCNC: 0 G/DL
PROT PATTERN SERPL ELPH-IMP: NORMAL

## 2019-05-28 ENCOUNTER — APPOINTMENT (OUTPATIENT)
Dept: LAB | Facility: CLINIC | Age: 54
End: 2019-05-28
Attending: INTERNAL MEDICINE
Payer: COMMERCIAL

## 2019-05-28 ENCOUNTER — OFFICE VISIT (OUTPATIENT)
Dept: TRANSPLANT | Facility: CLINIC | Age: 54
End: 2019-05-28
Attending: INTERNAL MEDICINE
Payer: COMMERCIAL

## 2019-05-28 VITALS
RESPIRATION RATE: 16 BRPM | SYSTOLIC BLOOD PRESSURE: 135 MMHG | BODY MASS INDEX: 36.91 KG/M2 | HEART RATE: 110 BPM | OXYGEN SATURATION: 97 % | DIASTOLIC BLOOD PRESSURE: 80 MMHG | HEIGHT: 73 IN | WEIGHT: 278.5 LBS | TEMPERATURE: 98.9 F

## 2019-05-28 DIAGNOSIS — C82.80 OTHER TYPE OF FOLLICULAR LYMPHOMA, UNSPECIFIED BODY REGION (H): Primary | ICD-10-CM

## 2019-05-28 LAB
ALBUMIN SERPL-MCNC: 4.1 G/DL (ref 3.4–5)
ALP SERPL-CCNC: 102 U/L (ref 40–150)
ALT SERPL W P-5'-P-CCNC: 45 U/L (ref 0–70)
ANION GAP SERPL CALCULATED.3IONS-SCNC: 7 MMOL/L (ref 3–14)
AST SERPL W P-5'-P-CCNC: 28 U/L (ref 0–45)
BASOPHILS # BLD AUTO: 0 10E9/L (ref 0–0.2)
BASOPHILS NFR BLD AUTO: 0.6 %
BILIRUB SERPL-MCNC: 0.5 MG/DL (ref 0.2–1.3)
BUN SERPL-MCNC: 14 MG/DL (ref 7–30)
CALCIUM SERPL-MCNC: 9.5 MG/DL (ref 8.5–10.1)
CHLORIDE SERPL-SCNC: 108 MMOL/L (ref 94–109)
CO2 SERPL-SCNC: 24 MMOL/L (ref 20–32)
CREAT SERPL-MCNC: 1.06 MG/DL (ref 0.66–1.25)
DIFFERENTIAL METHOD BLD: NORMAL
EOSINOPHIL # BLD AUTO: 0.1 10E9/L (ref 0–0.7)
EOSINOPHIL NFR BLD AUTO: 1.7 %
ERYTHROCYTE [DISTWIDTH] IN BLOOD BY AUTOMATED COUNT: 12.4 % (ref 10–15)
GFR SERPL CREATININE-BSD FRML MDRD: 79 ML/MIN/{1.73_M2}
GLUCOSE SERPL-MCNC: 88 MG/DL (ref 70–99)
HCT VFR BLD AUTO: 42.9 % (ref 40–53)
HGB BLD-MCNC: 15.6 G/DL (ref 13.3–17.7)
IMM GRANULOCYTES # BLD: 0.1 10E9/L (ref 0–0.4)
IMM GRANULOCYTES NFR BLD: 1.1 %
LDH SERPL L TO P-CCNC: 421 U/L (ref 85–227)
LYMPHOCYTES # BLD AUTO: 1.6 10E9/L (ref 0.8–5.3)
LYMPHOCYTES NFR BLD AUTO: 25 %
MCH RBC QN AUTO: 31.6 PG (ref 26.5–33)
MCHC RBC AUTO-ENTMCNC: 36.4 G/DL (ref 31.5–36.5)
MCV RBC AUTO: 87 FL (ref 78–100)
MONOCYTES # BLD AUTO: 0.8 10E9/L (ref 0–1.3)
MONOCYTES NFR BLD AUTO: 11.9 %
NEUTROPHILS # BLD AUTO: 3.9 10E9/L (ref 1.6–8.3)
NEUTROPHILS NFR BLD AUTO: 59.7 %
NRBC # BLD AUTO: 0 10*3/UL
NRBC BLD AUTO-RTO: 0 /100
PLATELET # BLD AUTO: 188 10E9/L (ref 150–450)
POTASSIUM SERPL-SCNC: 4.2 MMOL/L (ref 3.4–5.3)
PROT SERPL-MCNC: 7.1 G/DL (ref 6.8–8.8)
PSA SERPL-MCNC: 0.89 UG/L (ref 0–4)
RBC # BLD AUTO: 4.93 10E12/L (ref 4.4–5.9)
SODIUM SERPL-SCNC: 139 MMOL/L (ref 133–144)
TSH SERPL DL<=0.005 MIU/L-ACNC: 2.47 MU/L (ref 0.4–4)
URATE SERPL-MCNC: 7.9 MG/DL (ref 3.5–7.2)
WBC # BLD AUTO: 6.6 10E9/L (ref 4–11)

## 2019-05-28 PROCEDURE — 85025 COMPLETE CBC W/AUTO DIFF WBC: CPT | Performed by: INTERNAL MEDICINE

## 2019-05-28 PROCEDURE — 36415 COLL VENOUS BLD VENIPUNCTURE: CPT

## 2019-05-28 PROCEDURE — 83615 LACTATE (LD) (LDH) ENZYME: CPT | Performed by: INTERNAL MEDICINE

## 2019-05-28 PROCEDURE — 90471 IMMUNIZATION ADMIN: CPT

## 2019-05-28 PROCEDURE — 90750 HZV VACC RECOMBINANT IM: CPT | Mod: ZF | Performed by: INTERNAL MEDICINE

## 2019-05-28 PROCEDURE — 00000402 ZZHCL STATISTIC TOTAL PROTEIN: Performed by: INTERNAL MEDICINE

## 2019-05-28 PROCEDURE — 80053 COMPREHEN METABOLIC PANEL: CPT | Performed by: INTERNAL MEDICINE

## 2019-05-28 PROCEDURE — 96372 THER/PROPH/DIAG INJ SC/IM: CPT

## 2019-05-28 PROCEDURE — 25000581 ZZH RX MED A9270 GY (STAT IND- M) 250: Mod: ZF | Performed by: INTERNAL MEDICINE

## 2019-05-28 PROCEDURE — 84165 PROTEIN E-PHORESIS SERUM: CPT | Performed by: INTERNAL MEDICINE

## 2019-05-28 PROCEDURE — 82784 ASSAY IGA/IGD/IGG/IGM EACH: CPT | Performed by: INTERNAL MEDICINE

## 2019-05-28 PROCEDURE — G0463 HOSPITAL OUTPT CLINIC VISIT: HCPCS | Mod: 25

## 2019-05-28 PROCEDURE — 84443 ASSAY THYROID STIM HORMONE: CPT | Performed by: INTERNAL MEDICINE

## 2019-05-28 PROCEDURE — 84550 ASSAY OF BLOOD/URIC ACID: CPT | Performed by: INTERNAL MEDICINE

## 2019-05-28 PROCEDURE — 84153 ASSAY OF PSA TOTAL: CPT | Performed by: INTERNAL MEDICINE

## 2019-05-28 RX ADMIN — ZOSTER VACCINE RECOMBINANT, ADJUVANTED 0.5 ML: KIT at 12:47

## 2019-05-28 ASSESSMENT — MIFFLIN-ST. JEOR: SCORE: 2157.15

## 2019-05-28 ASSESSMENT — PAIN SCALES - GENERAL: PAINLEVEL: NO PAIN (0)

## 2019-05-28 NOTE — PROGRESS NOTES
[facility] Onc clinic progress note     ONCOLOGY SUMMARY:   Julien Lee is a 53-year-old with a history of follicular lymphoma diagnosed originally in 2002.  He was considered stage DERIK with grade 1/3 follicular lymphoma.  Disease was noted in the lymph nodes within the abdomen and inguinal regions, plus interstitial involvement in the bone marrow.  Over the years, he has had disease at other sites, including above the diaphragm, neck, axillae, etc.  He has received multiple treatments including oral chlorambucil (- ), Rituximab (), chlorambucil again in  for a pleural effusion, R-CVP x 6 cycles from 2009-2010, radiation (2660 cGy) to the right neck in -2010 for a persistent local problem, and most recently 6 cycles of rituximab plus bendamustine from 10/2014 - 2015.  He has not required additional therapy over the past 3 years.     Mr. Lee's wife  in 2011 of a brain tumor.  He has subsequently raised their 3 children as a single parent.     HISTORY OF PRESENT ILLNESS:  Mr. Lee does not have much complaints. He tells me he is scheduled for a colonoscopy on 19 through his primary physician Dr. Chuck Cabrera.    He does note some difficulty voiding and feels he has not void completely the past few weeks. He denies dysuria, hematuria, weight loss, back pain, chills, night sweats, swelling of nodes in the neck/arm pits/groin.    REVIEW OF SYSTEMS:  A 10-point review otherwise negative.      MEDICATIONS:  None.      ALLERGIES:  None reported.      PHYSICAL EXAMINATION:  Weight 121.2 kg (compared with 124.2 kg), blood pressure 126/80, pulse , respirations 16, temperature 97.9.  O2 saturation 98% on room air.   HEENT:  No icterus or conjunctival injection.  Oropharynx negative.  Mucosa moist without plaque or ulceration.   NECK:  No cervical or supraclavicular adenopathy.  Slight asymmetry where patient identifies his area of concern in the right neck, but no  discrete lesion felt. No thyroid nodules/masses.   CHEST:  Clear to auscultation. No nodes appreciated under the pectoralis.   AXILLAE:  Negative.   HEART:  Regular rhythm.  No murmur.   ABDOMEN:  Soft.  The liver is at the right costal margin.  The spleen is not palpable.  No masses.  No tenderness.  Bowel sounds present.  No inguinal/femoral nodes. No subcutaneous nodules appreciated.   EXTREMITIES:  No lower extremity edema.  No epitrochlear nodes.   SKIN: No palpable subcutaneous masses. No changes in radiation field.     LABORATORY:   - uric acid - 7.9      Chest and abdominal CT scan 1/16/2019   1. Slightly enlarged lymph nodes in the mediastinum (change in size ~2 mm).  2. Resolved soft tissue density in the right upper abdomen but new left chest wall subcutaneous nodules  3. Decreased size of the inguinal and external iliac lymph nodes.  4. Stable to decreased periaortic and mesenteric adenopathy  5. Stable pulmonary nodules.      ASSESSMENT AND PLAN:      1. Follicular lymphoma   2. Prostate cancer screening today   3. Hyperuricemia, no history of gout    Patient is now 16-1/2 years following diagnosis and has not received therapy since 03/2015. CT scan on 1/16/19 shows slightly enlarged mediastinal lymph nodes, but mixed and no significant changes over the past year. No change in the small pulmonary nodules.  LDH level pending. The patient voices no concerns about residual from prior therapies.     - Surveillance CT Neck/Chest/Abdomen/Pelvis in 5 months   - Shingrix vaccine 1st dose and PSA level today   - Return in 5 months with labs and CT results to see Dr.Vercellotti Roman John MD   Internal Medicine, PGY2     ADDENDUM     LDH level elevated at 421. This is an elevation from 280 in 1/16/19. He may require surveillance CT sooner than 5 months. Case discussed with .     Roman John MD   Internal Medicine, PGY2         Attending  The patient was seen and examined by me separate from the  resident provider.The note above reflects my assessment and plan. I have personally reviewed today's labs,vital and radiology results. The points of care that were added by me are:    Seems to remain in remission  The LDH was added after pt seen in clinic so could be hemolyzed sample Patient will call when needed contact him to redraw blood luis If elevated will do CTscans earlier  Fuad Vega M.D.  [phone]    ADDENDUM  LDH  High but done after blood was sitting in lab Called pt and will repeat next week. If still high may get scans sooner

## 2019-05-28 NOTE — NURSING NOTE
Shingrix was given today in the left arm, patient tolerated well with no complications. See PARAMJIT MannA

## 2019-05-28 NOTE — NURSING NOTE
Chief Complaint   Patient presents with     Blood Draw     labs drawn  via vpt by rn. vs taken     Blood drawn via vpt by RN in lab. VS taken. Pt checked into next appointment.   Piper Kumari RN

## 2019-05-28 NOTE — NURSING NOTE
"Oncology Rooming Note    May 28, 2019 11:35 AM   Julien Lee is a 53 year old male who presents for:    Chief Complaint   Patient presents with     Blood Draw     labs drawn  via vpt by rn. vs taken     Oncology Clinic Visit     Return: Follicular lymphoma      Initial Vitals: /80 (BP Location: Right arm, Patient Position: Sitting, Cuff Size: Adult Large)   Pulse 110   Temp 98.9  F (37.2  C) (Oral)   Resp 16   Ht 1.854 m (6' 1\")   Wt 126.3 kg (278 lb 8 oz)   SpO2 97%   BMI 36.74 kg/m   Estimated body mass index is 36.74 kg/m  as calculated from the following:    Height as of this encounter: 1.854 m (6' 1\").    Weight as of this encounter: 126.3 kg (278 lb 8 oz). Body surface area is 2.55 meters squared.  No Pain (0) Comment: Data Unavailable   No LMP for male patient.  Allergies reviewed: Yes  Medications reviewed: Yes    Medications: Medication refills not needed today.  Pharmacy name entered into [employer]:    [id number] [place], [place] - [address] [place] [address] PHARMACY [place], [place] - [address] [facility] [phone]    Clinical concerns: N/A      Franca Stark CMA              "

## 2019-05-29 LAB
ALBUMIN SERPL ELPH-MCNC: 4.5 G/DL (ref 3.7–5.1)
ALPHA1 GLOB SERPL ELPH-MCNC: 0.3 G/DL (ref 0.2–0.4)
ALPHA2 GLOB SERPL ELPH-MCNC: 0.6 G/DL (ref 0.5–0.9)
B-GLOBULIN SERPL ELPH-MCNC: 0.8 G/DL (ref 0.6–1)
GAMMA GLOB SERPL ELPH-MCNC: 0.8 G/DL (ref 0.7–1.6)
M PROTEIN SERPL ELPH-MCNC: 0 G/DL
PROT PATTERN SERPL ELPH-IMP: NORMAL

## 2019-05-30 ENCOUNTER — TELEPHONE (OUTPATIENT)
Dept: TRANSPLANT | Facility: CLINIC | Age: 54
End: 2019-05-30

## 2019-05-30 LAB — IGG SERPL-MCNC: 817 MG/DL (ref 695–1620)

## 2019-05-30 NOTE — PROGRESS NOTES
CLINICAL NUTRITION SERVICES     Reason for Contact: Patient was contacted by phone due to requested to speak with a Dietitian on the Oncology Distress Screening tool.     Action: RD called patient and was sent to a VM. Left a VM with a return call back number.     Follow up: Wait for a return phone call.    Abi Servin MS, RD, , LD.  5C/BMT Pager:[phone]

## 2019-06-03 DIAGNOSIS — C82.80 OTHER TYPE OF FOLLICULAR LYMPHOMA, UNSPECIFIED BODY REGION (H): ICD-10-CM

## 2019-06-03 LAB
ALBUMIN SERPL-MCNC: 4.2 G/DL (ref 3.4–5)
ALP SERPL-CCNC: 104 U/L (ref 40–150)
ALT SERPL W P-5'-P-CCNC: 31 U/L (ref 0–70)
ANION GAP SERPL CALCULATED.3IONS-SCNC: 6 MMOL/L (ref 3–14)
AST SERPL W P-5'-P-CCNC: 18 U/L (ref 0–45)
BILIRUB SERPL-MCNC: 0.6 MG/DL (ref 0.2–1.3)
BUN SERPL-MCNC: 15 MG/DL (ref 7–30)
CALCIUM SERPL-MCNC: 9.1 MG/DL (ref 8.5–10.1)
CHLORIDE SERPL-SCNC: 108 MMOL/L (ref 94–109)
CO2 SERPL-SCNC: 25 MMOL/L (ref 20–32)
CREAT SERPL-MCNC: 1.04 MG/DL (ref 0.66–1.25)
GFR SERPL CREATININE-BSD FRML MDRD: 81 ML/MIN/{1.73_M2}
GLUCOSE SERPL-MCNC: 94 MG/DL (ref 70–99)
LDH SERPL L TO P-CCNC: 267 U/L (ref 85–227)
POTASSIUM SERPL-SCNC: 3.9 MMOL/L (ref 3.4–5.3)
PROT SERPL-MCNC: 7.4 G/DL (ref 6.8–8.8)
SODIUM SERPL-SCNC: 138 MMOL/L (ref 133–144)

## 2019-06-03 PROCEDURE — 83615 LACTATE (LD) (LDH) ENZYME: CPT | Performed by: INTERNAL MEDICINE

## 2019-06-03 PROCEDURE — 80053 COMPREHEN METABOLIC PANEL: CPT | Performed by: INTERNAL MEDICINE

## 2019-06-03 NOTE — NURSING NOTE
Chief Complaint   Patient presents with     Blood Draw     Labs drawn via  by RN in lab. Lab appt only.      Kaylene Sung RN

## 2019-10-03 ENCOUNTER — HEALTH MAINTENANCE LETTER (OUTPATIENT)
Age: 54
End: 2019-10-03

## 2019-10-22 ENCOUNTER — ANCILLARY PROCEDURE (OUTPATIENT)
Dept: CT IMAGING | Facility: CLINIC | Age: 54
End: 2019-10-22
Attending: INTERNAL MEDICINE
Payer: COMMERCIAL

## 2019-10-22 DIAGNOSIS — C82.80 OTHER TYPE OF FOLLICULAR LYMPHOMA, UNSPECIFIED BODY REGION (H): ICD-10-CM

## 2019-10-22 LAB
ALBUMIN SERPL-MCNC: 4.1 G/DL (ref 3.4–5)
ALP SERPL-CCNC: 98 U/L (ref 40–150)
ALT SERPL W P-5'-P-CCNC: 32 U/L (ref 0–70)
ANION GAP SERPL CALCULATED.3IONS-SCNC: 6 MMOL/L (ref 3–14)
AST SERPL W P-5'-P-CCNC: 17 U/L (ref 0–45)
BASOPHILS # BLD AUTO: 0.1 10E9/L (ref 0–0.2)
BASOPHILS NFR BLD AUTO: 0.9 %
BILIRUB SERPL-MCNC: 0.6 MG/DL (ref 0.2–1.3)
BUN SERPL-MCNC: 14 MG/DL (ref 7–30)
CALCIUM SERPL-MCNC: 9.1 MG/DL (ref 8.5–10.1)
CHLORIDE SERPL-SCNC: 107 MMOL/L (ref 94–109)
CO2 SERPL-SCNC: 25 MMOL/L (ref 20–32)
CREAT SERPL-MCNC: 0.94 MG/DL (ref 0.66–1.25)
CRP SERPL-MCNC: 6 MG/L (ref 0–8)
DIFFERENTIAL METHOD BLD: NORMAL
EOSINOPHIL # BLD AUTO: 0.1 10E9/L (ref 0–0.7)
EOSINOPHIL NFR BLD AUTO: 2.4 %
ERYTHROCYTE [DISTWIDTH] IN BLOOD BY AUTOMATED COUNT: 11.9 % (ref 10–15)
ERYTHROCYTE [SEDIMENTATION RATE] IN BLOOD BY WESTERGREN METHOD: 4 MM/H (ref 0–20)
GFR SERPL CREATININE-BSD FRML MDRD: >90 ML/MIN/{1.73_M2}
GLUCOSE SERPL-MCNC: 97 MG/DL (ref 70–99)
HCT VFR BLD AUTO: 44.9 % (ref 40–53)
HGB BLD-MCNC: 16.3 G/DL (ref 13.3–17.7)
IGA SERPL-MCNC: 68 MG/DL (ref 70–380)
IGG SERPL-MCNC: 840 MG/DL (ref 695–1620)
IGM SERPL-MCNC: 77 MG/DL (ref 60–265)
IMM GRANULOCYTES # BLD: 0.1 10E9/L (ref 0–0.4)
IMM GRANULOCYTES NFR BLD: 1.1 %
LDH SERPL L TO P-CCNC: 237 U/L (ref 85–227)
LYMPHOCYTES # BLD AUTO: 1.7 10E9/L (ref 0.8–5.3)
LYMPHOCYTES NFR BLD AUTO: 31.4 %
MCH RBC QN AUTO: 32 PG (ref 26.5–33)
MCHC RBC AUTO-ENTMCNC: 36.3 G/DL (ref 31.5–36.5)
MCV RBC AUTO: 88 FL (ref 78–100)
MONOCYTES # BLD AUTO: 0.6 10E9/L (ref 0–1.3)
MONOCYTES NFR BLD AUTO: 11.4 %
NEUTROPHILS # BLD AUTO: 2.9 10E9/L (ref 1.6–8.3)
NEUTROPHILS NFR BLD AUTO: 52.8 %
NRBC # BLD AUTO: 0 10*3/UL
NRBC BLD AUTO-RTO: 0 /100
PLATELET # BLD AUTO: 178 10E9/L (ref 150–450)
POTASSIUM SERPL-SCNC: 4 MMOL/L (ref 3.4–5.3)
PROT SERPL-MCNC: 7.2 G/DL (ref 6.8–8.8)
RBC # BLD AUTO: 5.09 10E12/L (ref 4.4–5.9)
SODIUM SERPL-SCNC: 138 MMOL/L (ref 133–144)
URATE SERPL-MCNC: 7.7 MG/DL (ref 3.5–7.2)
WBC # BLD AUTO: 5.5 10E9/L (ref 4–11)

## 2019-10-22 RX ORDER — IOPAMIDOL 755 MG/ML
135 INJECTION, SOLUTION INTRAVASCULAR ONCE
Status: COMPLETED | OUTPATIENT
Start: 2019-10-22 | End: 2019-10-22

## 2019-10-22 RX ADMIN — IOPAMIDOL 135 ML: 755 INJECTION, SOLUTION INTRAVASCULAR at 10:46

## 2019-10-24 LAB
ALBUMIN SERPL ELPH-MCNC: 4.6 G/DL (ref 3.7–5.1)
ALPHA1 GLOB SERPL ELPH-MCNC: 0.3 G/DL (ref 0.2–0.4)
ALPHA2 GLOB SERPL ELPH-MCNC: 0.6 G/DL (ref 0.5–0.9)
B-GLOBULIN SERPL ELPH-MCNC: 0.8 G/DL (ref 0.6–1)
GAMMA GLOB SERPL ELPH-MCNC: 0.8 G/DL (ref 0.7–1.6)
IGE SERPL-ACNC: <2 KIU/L (ref 0–114)
M PROTEIN SERPL ELPH-MCNC: 0 G/DL
PROT PATTERN SERPL ELPH-IMP: NORMAL

## 2019-10-29 ENCOUNTER — OFFICE VISIT (OUTPATIENT)
Dept: TRANSPLANT | Facility: CLINIC | Age: 54
End: 2019-10-29
Attending: INTERNAL MEDICINE
Payer: COMMERCIAL

## 2019-10-29 VITALS
HEART RATE: 118 BPM | SYSTOLIC BLOOD PRESSURE: 129 MMHG | DIASTOLIC BLOOD PRESSURE: 87 MMHG | RESPIRATION RATE: 18 BRPM | WEIGHT: 278 LBS | OXYGEN SATURATION: 97 % | TEMPERATURE: 97.9 F | BODY MASS INDEX: 36.68 KG/M2

## 2019-10-29 DIAGNOSIS — C82.80 OTHER TYPE OF FOLLICULAR LYMPHOMA, UNSPECIFIED BODY REGION (H): ICD-10-CM

## 2019-10-29 PROCEDURE — 25000128 H RX IP 250 OP 636: Mod: ZF | Performed by: INTERNAL MEDICINE

## 2019-10-29 PROCEDURE — G0008 ADMIN INFLUENZA VIRUS VAC: HCPCS

## 2019-10-29 PROCEDURE — 90682 RIV4 VACC RECOMBINANT DNA IM: CPT | Mod: ZF | Performed by: INTERNAL MEDICINE

## 2019-10-29 PROCEDURE — G0463 HOSPITAL OUTPT CLINIC VISIT: HCPCS | Mod: 25

## 2019-10-29 RX ADMIN — INFLUENZA A VIRUS A/BRISBANE/02/2018 (H1N1) RECOMBINANT HEMAGGLUTININ ANTIGEN, INFLUENZA A VIRUS A/KANSAS/14/2017 (H3N2) RECOMBINANT HEMAGGLUTININ ANTIGEN, INFLUENZA B VIRUS B/PHUKET/3073/2013 RECOMBINANT HEMAGGLUTININ ANTIGEN, AND INFLUENZA B VIRUS B/MARYLAND/15/2016 RECOMBINANT HEMAGGLUTININ ANTIGEN 0.5 ML: 45; 45; 45; 45 INJECTION INTRAMUSCULAR at 13:45

## 2019-10-29 ASSESSMENT — PAIN SCALES - GENERAL: PAINLEVEL: NO PAIN (0)

## 2019-10-29 NOTE — PROGRESS NOTES
[facility] Onc clinic progress note     ONCOLOGY SUMMARY:   Julien Lee is a 53-year-old with a history of follicular lymphoma diagnosed originally in 2002.  He was considered stage DERIK with grade 1/3 follicular lymphoma.  Disease was noted in the lymph nodes within the abdomen and inguinal regions, plus interstitial involvement in the bone marrow.  Over the years, he has had disease at other sites, including above the diaphragm, neck, axillae, etc.  He has received multiple treatments including oral chlorambucil (- ), Rituximab (), chlorambucil again in  for a pleural effusion, R-CVP x 6 cycles from 2009-2010, radiation (2660 cGy) to the right neck in -2010 for a persistent local problem, and most recently 6 cycles of rituximab plus bendamustine from 10/2014 - 2015.  He has not required additional therapy over the past 4 years.     Mr. Lee's wife  in 2011 of a brain tumor.  He has subsequently raised their 3 children as a single parent.     HISTORY OF PRESENT ILLNESS:  Mr. Lee does not have much complaints. He had a colonoscopy on 19 through his primary physician Dr. Chuck Cabrera and the results were nl and repeat in 5 years.    He does continue to note some difficulty voiding and feels he has not void completely the past few weeks. He denies dysuria, hematuria, weight loss, back pain, chills, night sweats, swelling of nodes in the neck/arm pits/groin.    REVIEW OF SYSTEMS:  A 10-point review otherwise negative.      MEDICATIONS:  None.      ALLERGIES:  None reported.      PHYSICAL EXAMINATION:  Weight 121.2 kg (compared with 124.2 kg), blood pressure 126/80, pulse , respirations 16, temperature 97.9.  O2 saturation 98% on room air.   HEENT:  No icterus or conjunctival injection.  Oropharynx negative.  Mucosa moist without plaque or ulceration.   NECK:  No cervical or supraclavicular adenopathy.  Slight asymmetry where patient identifies his area of concern  in the right neck, but no discrete lesion felt. No thyroid nodules/masses.   CHEST:  Clear to auscultation. No nodes appreciated under the pectoralis.   AXILLAE:  Negative.   HEART:  Regular rhythm.  No murmur.   ABDOMEN:  Soft.  The liver is at the right costal margin.  The spleen is not palpable.  No masses.  No tenderness.  Bowel sounds present.  No inguinal/femoral nodes. No subcutaneous nodules appreciated.   EXTREMITIES:  No lower extremity edema.  No epitrochlear nodes. 1cm Left inguinal node  SKIN: No palpable subcutaneous masses. No changes in radiation field.   KARNOFSKY 100            ASSESSMENT AND PLAN:      1. Follicular lymphoma   2. Prostate cancer screening today   3. Hyperuricemia, no history of gout    Patient is now 17-1/2 years following diagnosis and is in CR and has not received therapy since 03/2015. CT scans show no significant changes over the past year. No change in the small pulmonary nodules.  LDH level nl The patient voices no concerns about residual from prior therapies.   Will give him a flu shot today.Now that he will be 5 years post chemo will not do scans but will do blood work      - Return in 6 months with labs         Fuad Vega M.D.  [phone]      Results for VERNELL KAT (MRN [id number]) as of 10/29/2019 13:09   Ref. Range 10/22/2019 10:22   Sodium Latest Ref Range: 133 - 144 mmol/L 138   Potassium Latest Ref Range: 3.4 - 5.3 mmol/L 4.0   Chloride Latest Ref Range: 94 - 109 mmol/L 107   Carbon Dioxide Latest Ref Range: 20 - 32 mmol/L 25   Urea Nitrogen Latest Ref Range: 7 - 30 mg/dL 14   Creatinine Latest Ref Range: 0.66 - 1.25 mg/dL 0.94   GFR Estimate Latest Ref Range: >60 mL/min/1.73_m2 >90   GFR Estimate If Black Latest Ref Range: >60 mL/min/1.73_m2 >90   Calcium Latest Ref Range: 8.5 - 10.1 mg/dL 9.1   Anion Gap Latest Ref Range: 3 - 14 mmol/L 6   Albumin Latest Ref Range: 3.4 - 5.0 g/dL 4.1   Protein Total Latest Ref Range: 6.8 - 8.8 g/dL 7.2    Bilirubin Total Latest Ref Range: 0.2 - 1.3 mg/dL 0.6   Alkaline Phosphatase Latest Ref Range: 40 - 150 U/L 98   ALT Latest Ref Range: 0 - 70 U/L 32   AST Latest Ref Range: 0 - 45 U/L 17   CRP Inflammation Latest Ref Range: 0.0 - 8.0 mg/L 6.0   IGE Latest Ref Range: 0 - 114 KIU/L <2   Lactate Dehydrogenase Latest Ref Range: 85 - 227 U/L 237 (H)   Uric Acid Latest Ref Range: 3.5 - 7.2 mg/dL 7.7 (H)   Glucose Latest Ref Range: 70 - 99 mg/dL 97   WBC Latest Ref Range: 4.0 - 11.0 10e9/L 5.5   Hemoglobin Latest Ref Range: 13.3 - 17.7 g/dL 16.3   Hematocrit Latest Ref Range: 40.0 - 53.0 % 44.9   Platelet Count Latest Ref Range: 150 - 450 10e9/L 178   RBC Count Latest Ref Range: 4.4 - 5.9 10e12/L 5.09   MCV Latest Ref Range: 78 - 100 fl 88   MCH Latest Ref Range: 26.5 - 33.0 pg 32.0   MCHC Latest Ref Range: 31.5 - 36.5 g/dL 36.3   RDW Latest Ref Range: 10.0 - 15.0 % 11.9   Diff Method Unknown Automated Method   % Neutrophils Latest Units: % 52.8   % Lymphocytes Latest Units: % 31.4   % Monocytes Latest Units: % 11.4   % Eosinophils Latest Units: % 2.4   % Basophils Latest Units: % 0.9   % Immature Granulocytes Latest Units: % 1.1   Nucleated RBCs Latest Ref Range: 0 /100 0   Absolute Neutrophil Latest Ref Range: 1.6 - 8.3 10e9/L 2.9   Absolute Lymphocytes Latest Ref Range: 0.8 - 5.3 10e9/L 1.7   Absolute Monocytes Latest Ref Range: 0.0 - 1.3 10e9/L 0.6   Absolute Eosinophils Latest Ref Range: 0.0 - 0.7 10e9/L 0.1   Absolute Basophils Latest Ref Range: 0.0 - 0.2 10e9/L 0.1   Abs Immature Granulocytes Latest Ref Range: 0 - 0.4 10e9/L 0.1   Absolute Nucleated RBC Unknown 0.0   Sed Rate Latest Ref Range: 0 - 20 mm/h 4   Albumin Fraction Latest Ref Range: 3.7 - 5.1 g/dL 4.6   Alpha 1 Fraction Latest Ref Range: 0.2 - 0.4 g/dL 0.3   Alpha 2 Fraction Latest Ref Range: 0.5 - 0.9 g/dL 0.6   Beta Fraction Latest Ref Range: 0.6 - 1.0 g/dL 0.8   ELP Interpretation: Unknown Essentially kannan...   Gamma Fraction Latest Ref Range: 0.7 -  1.6 g/dL 0.8   IGA Latest Ref Range: 70 - 380 mg/dL 68 (L)   IGG Latest Ref Range: 695 - 1,620 mg/dL 840   IGM Latest Ref Range: 60 - 265 mg/dL 77   Monoclonal Peak Latest Ref Range: 0.0 g/dL 0.0   CT CHEST/ABDOMEN/PELVIS WITH CONTRAST October 22, 2019 11:00 AM      HISTORY: Surveillance CT for follicular lymphoma. Other type of  follicular lymphoma, unspecified body region (H).     TECHNIQUE: ISOVUE 370 135 mL. CT images of the chest, abdomen, and  pelvis after nonionic intravenous contrast. Radiation dose for this  scan was reduced using automated exposure control, adjustment of the  mA and/or kV according to patient size, or iterative reconstruction  technique.     COMPARISON: 1/16/2019.     FINDINGS:      Chest: 3 mm pleural-based nodule left upper lobe (series 5, image 110)  is unchanged. Pleural-based nodule left upper lobe measuring 6 mm  (series 5, image 155) is unchanged. Pleural-based 3 mm nodule left  lower lobe (series 5, image 227) is unchanged. A few other tiny  pulmonary nodules are stable. No new pulmonary nodule or mass.     No pleural or pericardial effusion. Prominent but not pathologically  enlarged mediastinal and hilar lymph nodes are stable. Subcarinal  lymph node measures 1.0 cm in short axis (1.1 cm). No axillary  adenopathy.     Abdomen and pelvis: Liver cysts are unchanged. No new liver lesions.  The gallbladder is unremarkable. The spleen is normal in size. The  pancreas and adrenal glands are unremarkable. No hydronephrosis or  solid renal mass. No bowel obstruction, free air, or ascites.     No enlarged abdominal or retroperitoneal lymph nodes. Enlarged left  inguinal lymph node measuring 2.3 x 1.4 cm (series 3, image 127) is  unchanged. No pelvic free fluid.     No suspicious bone lesions.                                                                      IMPRESSION:  1. Stable pulmonary nodules.  2. Stable mediastinal and hilar lymph nodes.  3. Stable left groin lymph nodes.  4. No  evidence of disease progression.     YUE GONSALEZ MD  CT SOFT TISSUE NECK W CONTRAST 10/22/2019 11:00 AM     History:  Surveillance CT for follicular lymphoma; Other type of  follicular lymphoma, unspecified body region (H)  ICD-10: Other type of follicular lymphoma, unspecified body region (H)      Comparison:  PET CT 5/11/2015      Technique: Following intravenous administration of nonionic iodinated  contrast medium, thin section helical CT images were obtained from the  skull base down to the level of the aortic arch.  Axial, coronal and  sagittal reformations were performed with 2-3 mm slice thickness  reconstruction. Images were reviewed in soft tissue, lung and bone  windows.     Contrast: ISOVUE 370 135cc     Findings:   Evaluation of the mucosal space demonstrates no evident abnormality in  the nasopharynx, oropharynx, hypopharynx or the glottis. The tongue  base appears normal. The major salivary glands appear unremarkable.  The thyroid gland appears normal.     There is no evident cervical lymphadenopathy. The fascial spaces in  the neck are intact bilaterally. The major vascular structures in the  neck appear unremarkable.     Evaluation of the osseous structures demonstrate no worrisome lytic or  sclerotic lesion. No overt spinal canal or neuroforaminal stenosis.  The visualized paranasal sinuses are clear. The mastoid air cells are  clear.      The visualized lung apices are clear.                                                                      Impression:  No evidence of lymphoma recurrence.     DARREL MULLEN MD

## 2019-10-29 NOTE — NURSING NOTE
Flu vaccine administered to left deltoid without incidence.  Patient tolerated well.  Reviewed [facility] VIS document with pt.  Pt declined to take one home.  No further questions.    Temi Gutierrez RN, MSN

## 2019-10-29 NOTE — NURSING NOTE
"Oncology Rooming Note    October 29, 2019 1:25 PM   Julien Lee is a 53 year old male who presents for:    Chief Complaint   Patient presents with     Oncology Clinic Visit     Pt is here for a rtn for Follicular Lymphoma     Initial Vitals: Blood Pressure 129/87   Pulse 118   Temperature 97.9  F (36.6  C) (Oral)   Respiration 18   Weight 126.1 kg (278 lb)   Oxygen Saturation 97%   Body Mass Index 36.68 kg/m   Estimated body mass index is 36.68 kg/m  as calculated from the following:    Height as of 5/28/19: 1.854 m (6' 1\").    Weight as of this encounter: 126.1 kg (278 lb). Body surface area is 2.55 meters squared.  No Pain (0) Comment: Data Unavailable   No LMP for male patient.  Allergies reviewed: Yes  Medications reviewed: Yes    Medications: NONE  Pharmacy name entered into [employer]:    [id number] [place], [place] - [address] [place] [address] PHARMACY [place], [place] - [address] [facility] [id number]    Clinical concerns: none       Rochelle Sloan MA              "

## 2020-04-28 ENCOUNTER — VIRTUAL VISIT (OUTPATIENT)
Dept: TRANSPLANT | Facility: CLINIC | Age: 55
End: 2020-04-28
Attending: INTERNAL MEDICINE
Payer: COMMERCIAL

## 2020-04-28 VITALS
RESPIRATION RATE: 16 BRPM | DIASTOLIC BLOOD PRESSURE: 95 MMHG | OXYGEN SATURATION: 97 % | WEIGHT: 282.8 LBS | BODY MASS INDEX: 37.31 KG/M2 | HEART RATE: 103 BPM | TEMPERATURE: 97.7 F | SYSTOLIC BLOOD PRESSURE: 139 MMHG

## 2020-04-28 DIAGNOSIS — C82.80 OTHER TYPE OF FOLLICULAR LYMPHOMA, UNSPECIFIED BODY REGION (H): ICD-10-CM

## 2020-04-28 DIAGNOSIS — C82.80 OTHER TYPE OF FOLLICULAR LYMPHOMA, UNSPECIFIED BODY REGION (H): Primary | ICD-10-CM

## 2020-04-28 DIAGNOSIS — R74.02 ELEVATED LDH: ICD-10-CM

## 2020-04-28 LAB
ALBUMIN SERPL-MCNC: 4 G/DL (ref 3.4–5)
ALP SERPL-CCNC: 105 U/L (ref 40–150)
ALT SERPL W P-5'-P-CCNC: 38 U/L (ref 0–70)
ANION GAP SERPL CALCULATED.3IONS-SCNC: 6 MMOL/L (ref 3–14)
AST SERPL W P-5'-P-CCNC: 15 U/L (ref 0–45)
BASOPHILS # BLD AUTO: 0.1 10E9/L (ref 0–0.2)
BASOPHILS NFR BLD AUTO: 0.9 %
BILIRUB SERPL-MCNC: 0.4 MG/DL (ref 0.2–1.3)
BUN SERPL-MCNC: 15 MG/DL (ref 7–30)
CALCIUM SERPL-MCNC: 8.9 MG/DL (ref 8.5–10.1)
CHLORIDE SERPL-SCNC: 109 MMOL/L (ref 94–109)
CO2 SERPL-SCNC: 25 MMOL/L (ref 20–32)
CREAT SERPL-MCNC: 0.98 MG/DL (ref 0.66–1.25)
CRP SERPL-MCNC: 5.9 MG/L (ref 0–8)
DIFFERENTIAL METHOD BLD: NORMAL
EOSINOPHIL # BLD AUTO: 0.2 10E9/L (ref 0–0.7)
EOSINOPHIL NFR BLD AUTO: 3.1 %
ERYTHROCYTE [DISTWIDTH] IN BLOOD BY AUTOMATED COUNT: 12 % (ref 10–15)
ERYTHROCYTE [SEDIMENTATION RATE] IN BLOOD BY WESTERGREN METHOD: 4 MM/H (ref 0–20)
GFR SERPL CREATININE-BSD FRML MDRD: 86 ML/MIN/{1.73_M2}
GLUCOSE SERPL-MCNC: 98 MG/DL (ref 70–99)
HCT VFR BLD AUTO: 45 % (ref 40–53)
HGB BLD-MCNC: 16.4 G/DL (ref 13.3–17.7)
IMM GRANULOCYTES # BLD: 0.1 10E9/L (ref 0–0.4)
IMM GRANULOCYTES NFR BLD: 1.1 %
LDH SERPL L TO P-CCNC: 220 U/L (ref 85–227)
LYMPHOCYTES # BLD AUTO: 1.7 10E9/L (ref 0.8–5.3)
LYMPHOCYTES NFR BLD AUTO: 32.2 %
MCH RBC QN AUTO: 32.1 PG (ref 26.5–33)
MCHC RBC AUTO-ENTMCNC: 36.4 G/DL (ref 31.5–36.5)
MCV RBC AUTO: 88 FL (ref 78–100)
MONOCYTES # BLD AUTO: 0.7 10E9/L (ref 0–1.3)
MONOCYTES NFR BLD AUTO: 12.6 %
NEUTROPHILS # BLD AUTO: 2.7 10E9/L (ref 1.6–8.3)
NEUTROPHILS NFR BLD AUTO: 50.1 %
NRBC # BLD AUTO: 0 10*3/UL
NRBC BLD AUTO-RTO: 0 /100
PLATELET # BLD AUTO: 180 10E9/L (ref 150–450)
POTASSIUM SERPL-SCNC: 3.9 MMOL/L (ref 3.4–5.3)
PROT SERPL-MCNC: 7.2 G/DL (ref 6.8–8.8)
RBC # BLD AUTO: 5.11 10E12/L (ref 4.4–5.9)
SODIUM SERPL-SCNC: 140 MMOL/L (ref 133–144)
TSH SERPL DL<=0.005 MIU/L-ACNC: 3.83 MU/L (ref 0.4–4)
URATE SERPL-MCNC: 7.6 MG/DL (ref 3.5–7.2)
WBC # BLD AUTO: 5.4 10E9/L (ref 4–11)

## 2020-04-28 PROCEDURE — 85025 COMPLETE CBC W/AUTO DIFF WBC: CPT | Performed by: INTERNAL MEDICINE

## 2020-04-28 PROCEDURE — 82784 ASSAY IGA/IGD/IGG/IGM EACH: CPT | Performed by: INTERNAL MEDICINE

## 2020-04-28 PROCEDURE — 84443 ASSAY THYROID STIM HORMONE: CPT | Performed by: INTERNAL MEDICINE

## 2020-04-28 PROCEDURE — 83615 LACTATE (LD) (LDH) ENZYME: CPT | Performed by: INTERNAL MEDICINE

## 2020-04-28 PROCEDURE — 85652 RBC SED RATE AUTOMATED: CPT | Performed by: INTERNAL MEDICINE

## 2020-04-28 PROCEDURE — 84550 ASSAY OF BLOOD/URIC ACID: CPT | Performed by: INTERNAL MEDICINE

## 2020-04-28 PROCEDURE — 36415 COLL VENOUS BLD VENIPUNCTURE: CPT

## 2020-04-28 PROCEDURE — 84165 PROTEIN E-PHORESIS SERUM: CPT | Performed by: INTERNAL MEDICINE

## 2020-04-28 PROCEDURE — 86140 C-REACTIVE PROTEIN: CPT | Performed by: INTERNAL MEDICINE

## 2020-04-28 PROCEDURE — 80053 COMPREHEN METABOLIC PANEL: CPT | Performed by: INTERNAL MEDICINE

## 2020-04-28 PROCEDURE — 00000402 ZZHCL STATISTIC TOTAL PROTEIN: Performed by: INTERNAL MEDICINE

## 2020-04-28 ASSESSMENT — PAIN SCALES - GENERAL: PAINLEVEL: NO PAIN (0)

## 2020-04-28 NOTE — PROGRESS NOTES
"Julien Lee is a 54 year old male who is being evaluated via a billable telephone visit.      The patient has been notified of following:     \"This telephone visit will be conducted via a call between you and your physician/provider. We have found that certain health care needs can be provided without the need for a physical exam.  This service lets us provide the care you need with a short phone conversation.  If a prescription is necessary we can send it directly to your pharmacy.  If lab work is needed we can place an order for that and you can then stop by our lab to have the test done at a later time.    Telephone visits are billed at different rates depending on your insurance coverage. During this emergency period, for some insurers they may be billed the same as an in-person visit.  Please reach out to your insurance provider with any questions.    If during the course of the call the physician/provider feels a telephone visit is not appropriate, you will not be charged for this service.\"    Patient has given verbal consent for Telephone visit?  Yes    How would you like to obtain your AVS? Gopalt    Phone call duration: 12 minutes    [facility] progress note     ONCOLOGY SUMMARY:   Julien Lee is a 53-year-old with a history of follicular lymphoma diagnosed originally in 07/2002.  He was considered stage DERIK with grade 1/3 follicular lymphoma.  Disease was noted in the lymph nodes within the abdomen and inguinal regions, plus interstitial involvement in the bone marrow.  Over the years, he has had disease at other sites, including above the diaphragm, neck, axillae, etc.  He has received multiple treatments including oral chlorambucil (2002- 2003), Rituximab (01-02/2008), chlorambucil again in 2008 for a pleural effusion, R-CVP x 6 cycles from 12/2009-03/2010, radiation (2660 cGy) to the right neck in 06-07/2010 for a persistent local problem, and most recently 6 cycles of rituximab plus " bendamustine from 10/2014 - 2015.  He has not required additional therapy over the past 5years.     Mr. Lee's wife  in 2011 of a brain tumor.  He has subsequently raised their 3 children as a single parent.     HISTORY OF PRESENT ILLNESS:  Mr. Lee does not have much complaints. 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 No COVID sx no fever or chills myalgia of loss of smell No new nodes no wt loss no sweats  He. He denies dysuria, hematuria,  back pain,REVIEW OF SYSTEMS:  A 10-point review otherwise negative.      MEDICATIONS:  None.      ALLERGIES:  None reported.      ROS 12 pt ROS neg except as in HPI  PMH FH and SH see note from May 2019  PHYSICAL EXAMINATION:  Telephone visit          ASSESSMENT AND PLAN:      1. Follicular lymphoma   2. Prostate cancer screening today   3. Hyperuricemia, no history of gout    Patient is now 18 years following diagnosis and is in CR and has not received therapy since 2015. CT scans Oct 2019 show no significant changes over the past year. .  LDH level nl The patient voices no concerns about residual from prior therapies.   He is now 5 years post chemo will not do scans but will do blood work    Phone call 12 minutes  - Return in 6 months with labs         Fuad Vega M.D.  [phone]

## 2020-04-28 NOTE — NURSING NOTE
Chief Complaint   Patient presents with     Blood Draw     Labs drawn via   by RN in lab. VS taken.     Labs collected from venipuncture by RN. Vitals taken.     Tiffanie Fitzgerald RN

## 2020-04-29 LAB
IGA SERPL-MCNC: 70 MG/DL (ref 84–499)
IGG SERPL-MCNC: 798 MG/DL (ref 610–1616)
IGM SERPL-MCNC: 88 MG/DL (ref 35–242)

## 2020-04-30 LAB
ALBUMIN SERPL ELPH-MCNC: 4.4 G/DL (ref 3.7–5.1)
ALPHA1 GLOB SERPL ELPH-MCNC: 0.3 G/DL (ref 0.2–0.4)
ALPHA2 GLOB SERPL ELPH-MCNC: 0.6 G/DL (ref 0.5–0.9)
B-GLOBULIN SERPL ELPH-MCNC: 0.7 G/DL (ref 0.6–1)
GAMMA GLOB SERPL ELPH-MCNC: 0.8 G/DL (ref 0.7–1.6)
M PROTEIN SERPL ELPH-MCNC: 0 G/DL
PROT PATTERN SERPL ELPH-IMP: NORMAL

## 2020-10-28 ENCOUNTER — APPOINTMENT (OUTPATIENT)
Dept: LAB | Facility: CLINIC | Age: 55
End: 2020-10-28
Attending: INTERNAL MEDICINE
Payer: COMMERCIAL

## 2020-10-28 ENCOUNTER — VIRTUAL VISIT (OUTPATIENT)
Dept: TRANSPLANT | Facility: CLINIC | Age: 55
End: 2020-10-28
Attending: INTERNAL MEDICINE
Payer: COMMERCIAL

## 2020-10-28 VITALS
OXYGEN SATURATION: 96 % | RESPIRATION RATE: 18 BRPM | TEMPERATURE: 97.9 F | HEART RATE: 116 BPM | WEIGHT: 284.3 LBS | SYSTOLIC BLOOD PRESSURE: 154 MMHG | BODY MASS INDEX: 37.51 KG/M2 | DIASTOLIC BLOOD PRESSURE: 93 MMHG

## 2020-10-28 DIAGNOSIS — C82.80 OTHER TYPE OF FOLLICULAR LYMPHOMA, UNSPECIFIED BODY REGION (H): ICD-10-CM

## 2020-10-28 LAB
ALBUMIN SERPL-MCNC: 4.2 G/DL (ref 3.4–5)
ALP SERPL-CCNC: 100 U/L (ref 40–150)
ALT SERPL W P-5'-P-CCNC: 30 U/L (ref 0–70)
ANION GAP SERPL CALCULATED.3IONS-SCNC: 8 MMOL/L (ref 3–14)
AST SERPL W P-5'-P-CCNC: 14 U/L (ref 0–45)
BASOPHILS # BLD AUTO: 0 10E9/L (ref 0–0.2)
BASOPHILS NFR BLD AUTO: 0.7 %
BILIRUB SERPL-MCNC: 0.5 MG/DL (ref 0.2–1.3)
BUN SERPL-MCNC: 14 MG/DL (ref 7–30)
CALCIUM SERPL-MCNC: 9.2 MG/DL (ref 8.5–10.1)
CHLORIDE SERPL-SCNC: 108 MMOL/L (ref 94–109)
CO2 SERPL-SCNC: 21 MMOL/L (ref 20–32)
CREAT SERPL-MCNC: 1.08 MG/DL (ref 0.66–1.25)
CRP SERPL-MCNC: 5.6 MG/L (ref 0–8)
DIFFERENTIAL METHOD BLD: NORMAL
EOSINOPHIL # BLD AUTO: 0.1 10E9/L (ref 0–0.7)
EOSINOPHIL NFR BLD AUTO: 1.5 %
ERYTHROCYTE [DISTWIDTH] IN BLOOD BY AUTOMATED COUNT: 11.8 % (ref 10–15)
ERYTHROCYTE [SEDIMENTATION RATE] IN BLOOD BY WESTERGREN METHOD: 4 MM/H (ref 0–20)
GFR SERPL CREATININE-BSD FRML MDRD: 76 ML/MIN/{1.73_M2}
GLUCOSE SERPL-MCNC: 150 MG/DL (ref 70–99)
HCT VFR BLD AUTO: 46.6 % (ref 40–53)
HGB BLD-MCNC: 16.8 G/DL (ref 13.3–17.7)
IMM GRANULOCYTES # BLD: 0.1 10E9/L (ref 0–0.4)
IMM GRANULOCYTES NFR BLD: 0.9 %
LDH SERPL L TO P-CCNC: 219 U/L (ref 85–227)
LYMPHOCYTES # BLD AUTO: 1.6 10E9/L (ref 0.8–5.3)
LYMPHOCYTES NFR BLD AUTO: 29.6 %
MCH RBC QN AUTO: 31.3 PG (ref 26.5–33)
MCHC RBC AUTO-ENTMCNC: 36.1 G/DL (ref 31.5–36.5)
MCV RBC AUTO: 87 FL (ref 78–100)
MONOCYTES # BLD AUTO: 0.5 10E9/L (ref 0–1.3)
MONOCYTES NFR BLD AUTO: 9.8 %
NEUTROPHILS # BLD AUTO: 3.2 10E9/L (ref 1.6–8.3)
NEUTROPHILS NFR BLD AUTO: 57.5 %
NRBC # BLD AUTO: 0 10*3/UL
NRBC BLD AUTO-RTO: 0 /100
PLATELET # BLD AUTO: 188 10E9/L (ref 150–450)
POTASSIUM SERPL-SCNC: 3.8 MMOL/L (ref 3.4–5.3)
PROT SERPL-MCNC: 7.3 G/DL (ref 6.8–8.8)
RBC # BLD AUTO: 5.36 10E12/L (ref 4.4–5.9)
SODIUM SERPL-SCNC: 138 MMOL/L (ref 133–144)
URATE SERPL-MCNC: 7 MG/DL (ref 3.5–7.2)
WBC # BLD AUTO: 5.5 10E9/L (ref 4–11)

## 2020-10-28 PROCEDURE — 85025 COMPLETE CBC W/AUTO DIFF WBC: CPT | Mod: GT | Performed by: INTERNAL MEDICINE

## 2020-10-28 PROCEDURE — 83615 LACTATE (LD) (LDH) ENZYME: CPT | Mod: GT | Performed by: INTERNAL MEDICINE

## 2020-10-28 PROCEDURE — 86140 C-REACTIVE PROTEIN: CPT | Mod: GT | Performed by: INTERNAL MEDICINE

## 2020-10-28 PROCEDURE — 84550 ASSAY OF BLOOD/URIC ACID: CPT | Mod: GT | Performed by: INTERNAL MEDICINE

## 2020-10-28 PROCEDURE — 36415 COLL VENOUS BLD VENIPUNCTURE: CPT

## 2020-10-28 PROCEDURE — 99213 OFFICE O/P EST LOW 20 MIN: CPT | Mod: 95 | Performed by: INTERNAL MEDICINE

## 2020-10-28 PROCEDURE — 85652 RBC SED RATE AUTOMATED: CPT | Mod: GT | Performed by: INTERNAL MEDICINE

## 2020-10-28 PROCEDURE — 999N001193 HC VIDEO/TELEPHONE VISIT; NO CHARGE

## 2020-10-28 PROCEDURE — 80053 COMPREHEN METABOLIC PANEL: CPT | Mod: GT | Performed by: INTERNAL MEDICINE

## 2020-10-28 ASSESSMENT — PAIN SCALES - GENERAL: PAINLEVEL: NO PAIN (0)

## 2020-10-28 NOTE — PROGRESS NOTES
"Julien Lee is a 54 year old male who is being evaluated via a billable video visit.      The patient has been notified of following:     \"This video visit will be conducted via a call between you and your physician/provider. We have found that certain health care needs can be provided without the need for an in-person physical exam.  This service lets us provide the care you need with a video conversation.  If a prescription is necessary we can send it directly to your pharmacy.  If lab work is needed we can place an order for that and you can then stop by our lab to have the test done at a later time.    Video visits are billed at different rates depending on your insurance coverage.  Please reach out to your insurance provider with any questions.    If during the course of the call the physician/provider feels a video visit is not appropriate, you will not be charged for this service.\"    Patient has given verbal consent for Video visit? Yes    How would you like to obtain your AVS? MyChart     If you are dropped from the video visit, the video invite should be resent to: Text to cell phone: [phone]     Will anyone else be joining your video visit? No         BP (!) 154/93 (BP Location: Right arm, Patient Position: Sitting, Cuff Size: Adult Large)   Pulse 116   Temp 97.9  F (36.6  C) (Oral)   Resp 18   Wt 129 kg (284 lb 4.8 oz)   SpO2 96%   BMI 37.51 kg/m       Adan [facility] Onc clinic progress note     ONCOLOGY SUMMARY:   Julien Lee is a 53-year-old with a history of follicular lymphoma diagnosed originally in 07/2002.  He was considered stage DERIK with grade 1/3 follicular lymphoma.  Disease was noted in the lymph nodes within the abdomen and inguinal regions, plus interstitial involvement in the bone marrow.  Over the years, he has had disease at other sites, including above the diaphragm, neck, axillae, etc.  He has received multiple treatments including oral chlorambucil " (- ), Rituximab (), chlorambucil again in  for a pleural effusion, R-CVP x 6 cycles from 2009-2010, radiation (2660 cGy) to the right neck in -2010 for a persistent local problem, and most recently 6 cycles of rituximab plus bendamustine from 10/2014 - 2015.  He has not required additional therapy over the past 5years.     Mr. Lee's wife  in 2011 of a brain tumor.  He has subsequently raised their 3 children as a single parent.     HISTORY OF PRESENT ILLNESS:  Mr. Lee has been feeling well. 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 No COVID sx no fever or chills myalgia of loss of smell No new nodes no wt loss no sweats. No new nodes   He denies dysuria, hematuria,  back pain    ,REVIEW OF SYSTEMS:  A 10-point review otherwise negative.      MEDICATIONS:  None.      ALLERGIES:  None reported.      ROS 12 pt ROS neg except as in HPI  PMH FH and SH see note from 2020    PHYSICAL EXAMINATION:  By the video, he is an alert gentleman, verbal, in no acute distress.   BP (!) 154/93 (BP Location: Right arm, Patient Position: Sitting, Cuff Size: Adult Large)   Pulse 116   Temp 97.9  F (36.6  C) (Oral)   Resp 18   Wt 129 kg (284 lb 4.8 oz)   SpO2 96%   BMI 37.51 kg/m    EYES:  Grossly normal to inspection, no discharge, erythema or icterus.   SKIN:  Visible skin is clear.  No significant rash or abnormal pigmentation.   NEUROLOGIC:  Cranial nerves seem to be intact.  Mentation and speech is appropriate for age.   PSYCHIATRIC:  Mentation appears normal.  He does not seem anxious today.      Results for THERESA LEE (MRN [id number]) as of 10/28/2020 13:03   Ref. Range 10/28/2020 11:42   Sodium Latest Ref Range: 133 - 144 mmol/L 138   Potassium Latest Ref Range: 3.4 - 5.3 mmol/L 3.8   Chloride Latest Ref Range: 94 - 109 mmol/L 108   Carbon Dioxide Latest Ref Range: 20 - 32 mmol/L 21   Urea Nitrogen Latest Ref Range: 7 - 30 mg/dL 14   Creatinine  Latest Ref Range: 0.66 - 1.25 mg/dL 1.08   GFR Estimate Latest Ref Range: >60 mL/min/1.73_m2 76   GFR Estimate If Black Latest Ref Range: >60 mL/min/1.73_m2 89   Calcium Latest Ref Range: 8.5 - 10.1 mg/dL 9.2   Anion Gap Latest Ref Range: 3 - 14 mmol/L 8   Albumin Latest Ref Range: 3.4 - 5.0 g/dL 4.2   Protein Total Latest Ref Range: 6.8 - 8.8 g/dL 7.3   Bilirubin Total Latest Ref Range: 0.2 - 1.3 mg/dL 0.5   Alkaline Phosphatase Latest Ref Range: 40 - 150 U/L 100   ALT Latest Ref Range: 0 - 70 U/L 30   AST Latest Ref Range: 0 - 45 U/L 14   CRP Inflammation Latest Ref Range: 0.0 - 8.0 mg/L 5.6   Lactate Dehydrogenase Latest Ref Range: 85 - 227 U/L 219   Uric Acid Latest Ref Range: 3.5 - 7.2 mg/dL 7.0   Glucose Latest Ref Range: 70 - 99 mg/dL 150 (H)   WBC Latest Ref Range: 4.0 - 11.0 10e9/L 5.5   Hemoglobin Latest Ref Range: 13.3 - 17.7 g/dL 16.8   Hematocrit Latest Ref Range: 40.0 - 53.0 % 46.6   Platelet Count Latest Ref Range: 150 - 450 10e9/L 188   RBC Count Latest Ref Range: 4.4 - 5.9 10e12/L 5.36   MCV Latest Ref Range: 78 - 100 fl 87   MCH Latest Ref Range: 26.5 - 33.0 pg 31.3   MCHC Latest Ref Range: 31.5 - 36.5 g/dL 36.1   RDW Latest Ref Range: 10.0 - 15.0 % 11.8   Diff Method Unknown Automated Method   % Neutrophils Latest Units: % 57.5   % Lymphocytes Latest Units: % 29.6   % Monocytes Latest Units: % 9.8   % Eosinophils Latest Units: % 1.5   % Basophils Latest Units: % 0.7   % Immature Granulocytes Latest Units: % 0.9   Nucleated RBCs Latest Ref Range: 0 /100 0   Absolute Neutrophil Latest Ref Range: 1.6 - 8.3 10e9/L 3.2   Absolute Lymphocytes Latest Ref Range: 0.8 - 5.3 10e9/L 1.6   Absolute Monocytes Latest Ref Range: 0.0 - 1.3 10e9/L 0.5   Absolute Eosinophils Latest Ref Range: 0.0 - 0.7 10e9/L 0.1   Absolute Basophils Latest Ref Range: 0.0 - 0.2 10e9/L 0.0   Abs Immature Granulocytes Latest Ref Range: 0 - 0.4 10e9/L 0.1   Absolute Nucleated RBC Unknown 0.0   Sed Rate Latest Ref Range: 0 - 20 mm/h 4         ASSESSMENT AND PLAN:      1. Follicular lymphoma   2. Prostate cancer screening today   3. Hyperuricemia, no history of gout    4. Hypertension  Patient is now 18+ years following diagnosis and is in CR and has not received therapy since 03/2015. CT scans Oct 2019 show no significant changes over the past year. .  LDH level nl. Needs to get a flu shot. The patient voices no concerns about residual from prior therapies. BP up a bit today Needs to get it checked again PCP  He should take ASA 81mg since he had radiation and chem and has high BP.Would also [facility] for [facility]  He is now 5.5 years post chemo will not do scans but will do blood work      - Return in 6 months with labs in person for physical exam for nodes  Refer to [facility] clinic      Fuad Vega M.D.  [phone]        Video-Visit Details    Type of service:  Video Visit    Video Start Time: 100    Video End Time:  120    Originating Location (pt. Location): Home  Distant Location (provider location):  [facility] BLOOD AND MARROW TRANSPLANT PROGRAM [place]     Platform used for Video Visit:

## 2020-10-28 NOTE — LETTER
"    10/28/2020         RE: Julien Lee  [address] [facility] [address]      Julien Lee is a 54 year old male who is being evaluated via a billable video visit.      The patient has been notified of following:     \"This video visit will be conducted via a call between you and your physician/provider. We have found that certain health care needs can be provided without the need for an in-person physical exam.  This service lets us provide the care you need with a video conversation.  If a prescription is necessary we can send it directly to your pharmacy.  If lab work is needed we can place an order for that and you can then stop by our lab to have the test done at a later time.    Video visits are billed at different rates depending on your insurance coverage.  Please reach out to your insurance provider with any questions.    If during the course of the call the physician/provider feels a video visit is not appropriate, you will not be charged for this service.\"    Patient has given verbal consent for Video visit? Yes    How would you like to obtain your AVS? MyChart     If you are dropped from the video visit, the video invite should be resent to: Text to cell phone: [phone]     Will anyone else be joining your video visit? No         BP (!) 154/93 (BP Location: Right arm, Patient Position: Sitting, Cuff Size: Adult Large)   Pulse 116   Temp 97.9  F (36.6  C) (Oral)   Resp 18   Wt 129 kg (284 lb 4.8 oz)   SpO2 96%   BMI 37.51 kg/m       [facility] Onc clinic progress note     ONCOLOGY SUMMARY:   Julien Lee is a 53-year-old with a history of follicular lymphoma diagnosed originally in 07/2002.  He was considered stage DERIK with grade 1/3 follicular lymphoma.  Disease was noted in the lymph nodes within the abdomen and inguinal regions, plus interstitial involvement in the bone marrow.  Over the years, he has had disease at other sites, including above the " diaphragm, neck, axillae, etc.  He has received multiple treatments including oral chlorambucil (- ), Rituximab (), chlorambucil again in  for a pleural effusion, R-CVP x 6 cycles from 2009-2010, radiation (2660 cGy) to the right neck in  for a persistent local problem, and most recently 6 cycles of rituximab plus bendamustine from 10/2014 - 2015.  He has not required additional therapy over the past 5years.     Mr. Lee's wife  in 2011 of a brain tumor.  He has subsequently raised their 3 children as a single parent.     HISTORY OF PRESENT ILLNESS:  Mr. Lee has been feeling well. 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 No COVID sx no fever or chills myalgia of loss of smell No new nodes no wt loss no sweats. No new nodes   He denies dysuria, hematuria,  back pain    ,REVIEW OF SYSTEMS:  A 10-point review otherwise negative.      MEDICATIONS:  None.      ALLERGIES:  None reported.      ROS 12 pt ROS neg except as in HPI  PMH FH and SH see note from 2020    PHYSICAL EXAMINATION:  By the video, he is an alert gentleman, verbal, in no acute distress.   BP (!) 154/93 (BP Location: Right arm, Patient Position: Sitting, Cuff Size: Adult Large)   Pulse 116   Temp 97.9  F (36.6  C) (Oral)   Resp 18   Wt 129 kg (284 lb 4.8 oz)   SpO2 96%   BMI 37.51 kg/m    EYES:  Grossly normal to inspection, no discharge, erythema or icterus.   SKIN:  Visible skin is clear.  No significant rash or abnormal pigmentation.   NEUROLOGIC:  Cranial nerves seem to be intact.  Mentation and speech is appropriate for age.   PSYCHIATRIC:  Mentation appears normal.  He does not seem anxious today.      Results for THERESA LEE (MRN [id number]) as of 10/28/2020 13:03   Ref. Range 10/28/2020 11:42   Sodium Latest Ref Range: 133 - 144 mmol/L 138   Potassium Latest Ref Range: 3.4 - 5.3 mmol/L 3.8   Chloride Latest Ref Range: 94 - 109 mmol/L 108   Carbon Dioxide  Latest Ref Range: 20 - 32 mmol/L 21   Urea Nitrogen Latest Ref Range: 7 - 30 mg/dL 14   Creatinine Latest Ref Range: 0.66 - 1.25 mg/dL 1.08   GFR Estimate Latest Ref Range: >60 mL/min/1.73_m2 76   GFR Estimate If Black Latest Ref Range: >60 mL/min/1.73_m2 89   Calcium Latest Ref Range: 8.5 - 10.1 mg/dL 9.2   Anion Gap Latest Ref Range: 3 - 14 mmol/L 8   Albumin Latest Ref Range: 3.4 - 5.0 g/dL 4.2   Protein Total Latest Ref Range: 6.8 - 8.8 g/dL 7.3   Bilirubin Total Latest Ref Range: 0.2 - 1.3 mg/dL 0.5   Alkaline Phosphatase Latest Ref Range: 40 - 150 U/L 100   ALT Latest Ref Range: 0 - 70 U/L 30   AST Latest Ref Range: 0 - 45 U/L 14   CRP Inflammation Latest Ref Range: 0.0 - 8.0 mg/L 5.6   Lactate Dehydrogenase Latest Ref Range: 85 - 227 U/L 219   Uric Acid Latest Ref Range: 3.5 - 7.2 mg/dL 7.0   Glucose Latest Ref Range: 70 - 99 mg/dL 150 (H)   WBC Latest Ref Range: 4.0 - 11.0 10e9/L 5.5   Hemoglobin Latest Ref Range: 13.3 - 17.7 g/dL 16.8   Hematocrit Latest Ref Range: 40.0 - 53.0 % 46.6   Platelet Count Latest Ref Range: 150 - 450 10e9/L 188   RBC Count Latest Ref Range: 4.4 - 5.9 10e12/L 5.36   MCV Latest Ref Range: 78 - 100 fl 87   MCH Latest Ref Range: 26.5 - 33.0 pg 31.3   MCHC Latest Ref Range: 31.5 - 36.5 g/dL 36.1   RDW Latest Ref Range: 10.0 - 15.0 % 11.8   Diff Method Unknown Automated Method   % Neutrophils Latest Units: % 57.5   % Lymphocytes Latest Units: % 29.6   % Monocytes Latest Units: % 9.8   % Eosinophils Latest Units: % 1.5   % Basophils Latest Units: % 0.7   % Immature Granulocytes Latest Units: % 0.9   Nucleated RBCs Latest Ref Range: 0 /100 0   Absolute Neutrophil Latest Ref Range: 1.6 - 8.3 10e9/L 3.2   Absolute Lymphocytes Latest Ref Range: 0.8 - 5.3 10e9/L 1.6   Absolute Monocytes Latest Ref Range: 0.0 - 1.3 10e9/L 0.5   Absolute Eosinophils Latest Ref Range: 0.0 - 0.7 10e9/L 0.1   Absolute Basophils Latest Ref Range: 0.0 - 0.2 10e9/L 0.0   Abs Immature Granulocytes Latest Ref Range: 0  - 0.4 10e9/L 0.1   Absolute Nucleated RBC Unknown 0.0   Sed Rate Latest Ref Range: 0 - 20 mm/h 4        ASSESSMENT AND PLAN:      1. Follicular lymphoma   2. Prostate cancer screening today   3. Hyperuricemia, no history of gout    4. Hypertension  Patient is now 18+ years following diagnosis and is in CR and has not received therapy since 03/2015. CT scans Oct 2019 show no significant changes over the past year. .  LDH level nl. Needs to get a flu shot. The patient voices no concerns about residual from prior therapies. BP up a bit today Needs to get it checked again PCP  He should take ASA 81mg since he had radiation and chem and has high BP.Would also set up for the Cancer Survivors Clinic  He is now 5.5 years post chemo will not do scans but will do blood work      - Return in 6 months with labs in person for physical exam for nodes  Refer to Cancer survivors clinic      Fuad Vega M.D.  [phone]        Video-Visit Details    Type of service:  Video Visit    Video Start Time: 100    Video End Time:  120    Originating Location (pt. Location): Home  Distant Location (provider location):  [facility] BLOOD AND MARROW TRANSPLANT PROGRAM [place]     Platform used for Video Visit:         Fuad Vega MD

## 2020-10-28 NOTE — NURSING NOTE
Chief Complaint   Patient presents with     Blood Draw     Labs drawn via VPT by RN in lab. VS taken.      Labs collected from venipuncture by RN. Vitals taken. Checked in for appointment(s).    Serena OCONNOR RN PHN BSN  BMT/Oncology Lab

## 2020-10-28 NOTE — LETTER
"    10/28/2020         RE: Julien Lee  [address] [facility] [id number]        Dear Colleague,    Thank you for referring your patient, Julien Lee, to the [facility] BLOOD AND MARROW TRANSPLANT PROGRAM [place]. Please see a copy of my visit note below.    Julien Lee is a 54 year old male who is being evaluated via a billable video visit.      The patient has been notified of following:     \"This video visit will be conducted via a call between you and your physician/provider. We have found that certain health care needs can be provided without the need for an in-person physical exam.  This service lets us provide the care you need with a video conversation.  If a prescription is necessary we can send it directly to your pharmacy.  If lab work is needed we can place an order for that and you can then stop by our lab to have the test done at a later time.    Video visits are billed at different rates depending on your insurance coverage.  Please reach out to your insurance provider with any questions.    If during the course of the call the physician/provider feels a video visit is not appropriate, you will not be charged for this service.\"    Patient has given verbal consent for Video visit? Yes    How would you like to obtain your AVS? MyChart     If you are dropped from the video visit, the video invite should be resent to: Text to cell phone: [phone]     Will anyone else be joining your video visit? No         BP (!) 154/93 (BP Location: Right arm, Patient Position: Sitting, Cuff Size: Adult Large)   Pulse 116   Temp 97.9  F (36.6  C) (Oral)   Resp 18   Wt 129 kg (284 lb 4.8 oz)   SpO2 96%   BMI 37.51 kg/m       [facility] Onc clinic progress note     ONCOLOGY SUMMARY:   Julien Lee is a 53-year-old with a history of follicular lymphoma diagnosed originally in 07/2002.  He was considered stage DERIK with grade 1/3 follicular lymphoma. "  Disease was noted in the lymph nodes within the abdomen and inguinal regions, plus interstitial involvement in the bone marrow.  Over the years, he has had disease at other sites, including above the diaphragm, neck, axillae, etc.  He has received multiple treatments including oral chlorambucil (- ), Rituximab (), chlorambucil again in  for a pleural effusion, R-CVP x 6 cycles from 2009-2010, radiation (2660 cGy) to the right neck in  for a persistent local problem, and most recently 6 cycles of rituximab plus bendamustine from 10/2014 - 2015.  He has not required additional therapy over the past 5years.     Mr. Lee's wife  in 2011 of a brain tumor.  He has subsequently raised their 3 children as a single parent.     HISTORY OF PRESENT ILLNESS:  Mr. Lee has been feeling well. 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 No COVID sx no fever or chills myalgia of loss of smell No new nodes no wt loss no sweats. No new nodes   He denies dysuria, hematuria,  back pain    ,REVIEW OF SYSTEMS:  A 10-point review otherwise negative.      MEDICATIONS:  None.      ALLERGIES:  None reported.      ROS 12 pt ROS neg except as in HPI  PMH FH and SH see note from 2020    PHYSICAL EXAMINATION:  By the video, he is an alert gentleman, verbal, in no acute distress.   BP (!) 154/93 (BP Location: Right arm, Patient Position: Sitting, Cuff Size: Adult Large)   Pulse 116   Temp 97.9  F (36.6  C) (Oral)   Resp 18   Wt 129 kg (284 lb 4.8 oz)   SpO2 96%   BMI 37.51 kg/m    EYES:  Grossly normal to inspection, no discharge, erythema or icterus.   SKIN:  Visible skin is clear.  No significant rash or abnormal pigmentation.   NEUROLOGIC:  Cranial nerves seem to be intact.  Mentation and speech is appropriate for age.   PSYCHIATRIC:  Mentation appears normal.  He does not seem anxious today.      Results for THERESA LEE (MRN [id number]) as of 10/28/2020  13:03   Ref. Range 10/28/2020 11:42   Sodium Latest Ref Range: 133 - 144 mmol/L 138   Potassium Latest Ref Range: 3.4 - 5.3 mmol/L 3.8   Chloride Latest Ref Range: 94 - 109 mmol/L 108   Carbon Dioxide Latest Ref Range: 20 - 32 mmol/L 21   Urea Nitrogen Latest Ref Range: 7 - 30 mg/dL 14   Creatinine Latest Ref Range: 0.66 - 1.25 mg/dL 1.08   GFR Estimate Latest Ref Range: >60 mL/min/1.73_m2 76   GFR Estimate If Black Latest Ref Range: >60 mL/min/1.73_m2 89   Calcium Latest Ref Range: 8.5 - 10.1 mg/dL 9.2   Anion Gap Latest Ref Range: 3 - 14 mmol/L 8   Albumin Latest Ref Range: 3.4 - 5.0 g/dL 4.2   Protein Total Latest Ref Range: 6.8 - 8.8 g/dL 7.3   Bilirubin Total Latest Ref Range: 0.2 - 1.3 mg/dL 0.5   Alkaline Phosphatase Latest Ref Range: 40 - 150 U/L 100   ALT Latest Ref Range: 0 - 70 U/L 30   AST Latest Ref Range: 0 - 45 U/L 14   CRP Inflammation Latest Ref Range: 0.0 - 8.0 mg/L 5.6   Lactate Dehydrogenase Latest Ref Range: 85 - 227 U/L 219   Uric Acid Latest Ref Range: 3.5 - 7.2 mg/dL 7.0   Glucose Latest Ref Range: 70 - 99 mg/dL 150 (H)   WBC Latest Ref Range: 4.0 - 11.0 10e9/L 5.5   Hemoglobin Latest Ref Range: 13.3 - 17.7 g/dL 16.8   Hematocrit Latest Ref Range: 40.0 - 53.0 % 46.6   Platelet Count Latest Ref Range: 150 - 450 10e9/L 188   RBC Count Latest Ref Range: 4.4 - 5.9 10e12/L 5.36   MCV Latest Ref Range: 78 - 100 fl 87   MCH Latest Ref Range: 26.5 - 33.0 pg 31.3   MCHC Latest Ref Range: 31.5 - 36.5 g/dL 36.1   RDW Latest Ref Range: 10.0 - 15.0 % 11.8   Diff Method Unknown Automated Method   % Neutrophils Latest Units: % 57.5   % Lymphocytes Latest Units: % 29.6   % Monocytes Latest Units: % 9.8   % Eosinophils Latest Units: % 1.5   % Basophils Latest Units: % 0.7   % Immature Granulocytes Latest Units: % 0.9   Nucleated RBCs Latest Ref Range: 0 /100 0   Absolute Neutrophil Latest Ref Range: 1.6 - 8.3 10e9/L 3.2   Absolute Lymphocytes Latest Ref Range: 0.8 - 5.3 10e9/L 1.6   Absolute Monocytes Latest  Ref Range: 0.0 - 1.3 10e9/L 0.5   Absolute Eosinophils Latest Ref Range: 0.0 - 0.7 10e9/L 0.1   Absolute Basophils Latest Ref Range: 0.0 - 0.2 10e9/L 0.0   Abs Immature Granulocytes Latest Ref Range: 0 - 0.4 10e9/L 0.1   Absolute Nucleated RBC Unknown 0.0   Sed Rate Latest Ref Range: 0 - 20 mm/h 4        ASSESSMENT AND PLAN:      1. Follicular lymphoma   2. Prostate cancer screening today   3. Hyperuricemia, no history of gout    4. Hypertension  Patient is now 18+ years following diagnosis and is in CR and has not received therapy since 03/2015. CT scans Oct 2019 show no significant changes over the past year. .  LDH level nl. Needs to get a flu shot. The patient voices no concerns about residual from prior therapies. BP up a bit today Needs to get it checked again PCP  He should take ASA 81mg since he had radiation and chem and has high BP.Would also set up for the [facility]  He is now 5.5 years post chemo will not do scans but will do blood work      - Return in 6 months with labs in person for physical exam for nodes  Refer to Cancer survivors clinic      Fuad Vega M.D.  [phone]        Video-Visit Details    Type of service:  Video Visit    Video Start Time: 100    Video End Time:  120    Originating Location (pt. Location): Home  Distant Location (provider location):  [facility] BLOOD AND MARROW TRANSPLANT PROGRAM [place]     Platform used for Video Visit:       Again, thank you for allowing me to participate in the care of your patient.        Sincerely,        Fuad Vega MD

## 2020-11-07 ENCOUNTER — HEALTH MAINTENANCE LETTER (OUTPATIENT)
Age: 55
End: 2020-11-07

## 2021-03-22 ENCOUNTER — IMMUNIZATION (OUTPATIENT)
Dept: NURSING | Facility: CLINIC | Age: 56
End: 2021-03-22
Payer: COMMERCIAL

## 2021-03-22 PROCEDURE — 0001A PR COVID VAC PFIZER DIL RECON 30 MCG/0.3 ML IM: CPT

## 2021-03-22 PROCEDURE — 91300 PR COVID VAC PFIZER DIL RECON 30 MCG/0.3 ML IM: CPT

## 2021-04-12 ENCOUNTER — IMMUNIZATION (OUTPATIENT)
Dept: NURSING | Facility: CLINIC | Age: 56
End: 2021-04-12
Attending: FAMILY MEDICINE
Payer: COMMERCIAL

## 2021-04-12 PROCEDURE — 91300 PR COVID VAC PFIZER DIL RECON 30 MCG/0.3 ML IM: CPT

## 2021-04-12 PROCEDURE — 0002A PR COVID VAC PFIZER DIL RECON 30 MCG/0.3 ML IM: CPT

## 2021-04-27 DIAGNOSIS — C82.80 OTHER TYPE OF FOLLICULAR LYMPHOMA, UNSPECIFIED BODY REGION (H): Primary | ICD-10-CM

## 2021-04-28 ENCOUNTER — APPOINTMENT (OUTPATIENT)
Dept: LAB | Facility: CLINIC | Age: 56
End: 2021-04-28
Attending: INTERNAL MEDICINE
Payer: COMMERCIAL

## 2021-04-28 ENCOUNTER — OFFICE VISIT (OUTPATIENT)
Dept: TRANSPLANT | Facility: CLINIC | Age: 56
End: 2021-04-28
Attending: INTERNAL MEDICINE
Payer: COMMERCIAL

## 2021-04-28 VITALS
SYSTOLIC BLOOD PRESSURE: 144 MMHG | TEMPERATURE: 98.7 F | HEIGHT: 73 IN | DIASTOLIC BLOOD PRESSURE: 92 MMHG | BODY MASS INDEX: 40.01 KG/M2 | RESPIRATION RATE: 16 BRPM | OXYGEN SATURATION: 98 % | WEIGHT: 301.9 LBS | HEART RATE: 117 BPM

## 2021-04-28 DIAGNOSIS — C82.80 OTHER TYPE OF FOLLICULAR LYMPHOMA, UNSPECIFIED BODY REGION (H): ICD-10-CM

## 2021-04-28 LAB
ALBUMIN SERPL-MCNC: 3.9 G/DL (ref 3.4–5)
ALP SERPL-CCNC: 103 U/L (ref 40–150)
ALT SERPL W P-5'-P-CCNC: 38 U/L (ref 0–70)
ANION GAP SERPL CALCULATED.3IONS-SCNC: 5 MMOL/L (ref 3–14)
AST SERPL W P-5'-P-CCNC: 17 U/L (ref 0–45)
BASOPHILS # BLD AUTO: 0.1 10E9/L (ref 0–0.2)
BASOPHILS NFR BLD AUTO: 0.9 %
BILIRUB SERPL-MCNC: 0.6 MG/DL (ref 0.2–1.3)
BUN SERPL-MCNC: 12 MG/DL (ref 7–30)
CALCIUM SERPL-MCNC: 9 MG/DL (ref 8.5–10.1)
CHLORIDE SERPL-SCNC: 109 MMOL/L (ref 94–109)
CO2 SERPL-SCNC: 24 MMOL/L (ref 20–32)
CREAT SERPL-MCNC: 1.14 MG/DL (ref 0.66–1.25)
CRP SERPL-MCNC: 3.8 MG/L (ref 0–8)
DIFFERENTIAL METHOD BLD: NORMAL
EOSINOPHIL # BLD AUTO: 0.1 10E9/L (ref 0–0.7)
EOSINOPHIL NFR BLD AUTO: 1.9 %
ERYTHROCYTE [DISTWIDTH] IN BLOOD BY AUTOMATED COUNT: 12 % (ref 10–15)
ERYTHROCYTE [SEDIMENTATION RATE] IN BLOOD BY WESTERGREN METHOD: 4 MM/H (ref 0–20)
GFR SERPL CREATININE-BSD FRML MDRD: 71 ML/MIN/{1.73_M2}
GLUCOSE SERPL-MCNC: 94 MG/DL (ref 70–99)
HCT VFR BLD AUTO: 42.5 % (ref 40–53)
HGB BLD-MCNC: 15.2 G/DL (ref 13.3–17.7)
IMM GRANULOCYTES # BLD: 0.1 10E9/L (ref 0–0.4)
IMM GRANULOCYTES NFR BLD: 1.6 %
LDH SERPL L TO P-CCNC: 253 U/L (ref 85–227)
LYMPHOCYTES # BLD AUTO: 1.7 10E9/L (ref 0.8–5.3)
LYMPHOCYTES NFR BLD AUTO: 26 %
MCH RBC QN AUTO: 30.6 PG (ref 26.5–33)
MCHC RBC AUTO-ENTMCNC: 35.8 G/DL (ref 31.5–36.5)
MCV RBC AUTO: 86 FL (ref 78–100)
MONOCYTES # BLD AUTO: 0.9 10E9/L (ref 0–1.3)
MONOCYTES NFR BLD AUTO: 13.8 %
NEUTROPHILS # BLD AUTO: 3.6 10E9/L (ref 1.6–8.3)
NEUTROPHILS NFR BLD AUTO: 55.8 %
NRBC # BLD AUTO: 0 10*3/UL
NRBC BLD AUTO-RTO: 0 /100
PLATELET # BLD AUTO: 193 10E9/L (ref 150–450)
POTASSIUM SERPL-SCNC: 3.9 MMOL/L (ref 3.4–5.3)
PROT SERPL-MCNC: 7.1 G/DL (ref 6.8–8.8)
RBC # BLD AUTO: 4.96 10E12/L (ref 4.4–5.9)
SODIUM SERPL-SCNC: 139 MMOL/L (ref 133–144)
URATE SERPL-MCNC: 6.9 MG/DL (ref 3.5–7.2)
WBC # BLD AUTO: 6.5 10E9/L (ref 4–11)

## 2021-04-28 PROCEDURE — 82784 ASSAY IGA/IGD/IGG/IGM EACH: CPT | Performed by: INTERNAL MEDICINE

## 2021-04-28 PROCEDURE — 85652 RBC SED RATE AUTOMATED: CPT | Performed by: INTERNAL MEDICINE

## 2021-04-28 PROCEDURE — 84165 PROTEIN E-PHORESIS SERUM: CPT | Mod: TC | Performed by: INTERNAL MEDICINE

## 2021-04-28 PROCEDURE — 86140 C-REACTIVE PROTEIN: CPT | Performed by: INTERNAL MEDICINE

## 2021-04-28 PROCEDURE — 99213 OFFICE O/P EST LOW 20 MIN: CPT | Mod: GC | Performed by: INTERNAL MEDICINE

## 2021-04-28 PROCEDURE — 83615 LACTATE (LD) (LDH) ENZYME: CPT | Performed by: INTERNAL MEDICINE

## 2021-04-28 PROCEDURE — 84165 PROTEIN E-PHORESIS SERUM: CPT | Mod: 26 | Performed by: STUDENT IN AN ORGANIZED HEALTH CARE EDUCATION/TRAINING PROGRAM

## 2021-04-28 PROCEDURE — 80053 COMPREHEN METABOLIC PANEL: CPT | Performed by: INTERNAL MEDICINE

## 2021-04-28 PROCEDURE — 36415 COLL VENOUS BLD VENIPUNCTURE: CPT

## 2021-04-28 PROCEDURE — G0463 HOSPITAL OUTPT CLINIC VISIT: HCPCS

## 2021-04-28 PROCEDURE — 84550 ASSAY OF BLOOD/URIC ACID: CPT | Performed by: INTERNAL MEDICINE

## 2021-04-28 PROCEDURE — 85025 COMPLETE CBC W/AUTO DIFF WBC: CPT | Performed by: INTERNAL MEDICINE

## 2021-04-28 PROCEDURE — 999N001036 HC STATISTIC TOTAL PROTEIN: Performed by: INTERNAL MEDICINE

## 2021-04-28 RX ORDER — ASPIRIN 81 MG/1
81 TABLET, CHEWABLE ORAL DAILY
COMMUNITY

## 2021-04-28 ASSESSMENT — PAIN SCALES - GENERAL: PAINLEVEL: NO PAIN (0)

## 2021-04-28 ASSESSMENT — MIFFLIN-ST. JEOR: SCORE: 2253.16

## 2021-04-28 NOTE — NURSING NOTE
Chief Complaint   Patient presents with     Oncology Clinic Visit     [facility] RETURN - [facility]     Blood Draw     Labs drawn via VPT by RN in lab. VS taken.     Labs collected from venipuncture by RN. Vitals taken. Checked in for appointment(s).    Serena OCONNOR RN PHN BSN  BMT/Oncology Lab

## 2021-04-28 NOTE — PROGRESS NOTES
[facility] Onc Clinic Progress Note   2021     ONCOLOGY SUMMARY:  Julien Lee is a 53-year-old with a history of follicular lymphoma diagnosed originally in 2002. He was considered stage DERIK with grade 1/3 follicular lymphoma.  Disease was noted in the lymph nodes within the abdomen and inguinal regions, plus interstitial involvement in the bone marrow.  Over the years, he has had disease at other sites, including above the diaphragm, neck, axillae, etc.  He has received multiple treatments including oral chlorambucil (- ), Rituximab (), chlorambucil again in  for a pleural effusion, R-CVP x 6 cycles from 2009-2010, radiation (2660 cGy) to the right neck in -2010 for a persistent local problem, and most recently 6 cycles of rituximab plus bendamustine from 10/2014 - 2015.  He has not required additional therapy over the past 6 years.     Mr. Lee's wife  in 2011 of a brain tumor.  He has subsequently raised their 3 children as a single parent.     HISTORY OF PRESENT ILLNESS:  Mr. Lee has been feeling well. He continues to work from home. A couple of days ago he noticed that his right ankle was slightly swollen. He does not recall injuring it in any way, and it is not warm, painful, or cause changes in sensation.  Other than this, he has not had any fevers or chills, abdominal pain, or early satiety. He has not noticed any swollen lymph nodes. He has gained weight over the course of the pandemic and is hoping to be more active moving forward in order to lose weight.   He received both doses of the Pfizer COVID vaccine and tolerated them well. Two of his children are living at home with him right now, and his youngest is in college at [employer]. His brother was just diagnosed with prostate cancer.    REVIEW OF SYSTEMS:  A 10-point review otherwise negative.      MEDICATIONS:  Aspirin 81mg daily     ALLERGIES:  None reported.      ROS: 12 pt ROS neg except as in  "HPI  PMH FH and SH: see note from April 2020    PHYSICAL EXAMINATION:    BP (!) 144/92   Pulse 117   Temp 98.7  F (37.1  C) (Tympanic)   Resp 16   Ht 1.854 m (6' 0.99\")   Wt 136.9 kg (301 lb 14.4 oz)   SpO2 98%   BMI 39.84 kg/m     GENERAL: alert, in no apparent distress; no palpable cervical, axillary, inguinal lymphadenopathy  HEENT:  no scleral icterus, moist mucous membranes   CARDIAC: regular rate and rhythm  RESPIRATORY: clear to auscultation bilaterally   ABDOMEN: nontender, nondistended, no hepatosplenomegaly  SKIN: no bruising or rash on visible skin  EXTREMITIES: dry skin with mild flaking on bilateral lower extremities, mild erythema on bilateral lower extremities R>L. Slight swelling of right ankle joint that is nontender to palpation. No pitting edema.  NEUROLOGIC: Cranial nerves 2-12 grossly intact.      ASSESSMENT AND PLAN:      1. Follicular lymphoma    2. Hypertension  Patient is now ~19 years following diagnosis of follicular lymphoma and is in CR. He has not received therapy since 03/2015. CT scans Oct 2019 show no significant changes. LDH is slightly elevated today, but this is likely related to handling of the specimen and is not significantly elevated from baseline over the prior few years. BP elevated slightly today, should follow with PCP.   - Referral to [facility] for follow-up in 6 months. I will see him in 12 months  - If his ankle swelling worsens or becomes painful, he should contact his PCP for consideration of lower extremity US. Otherwise no indication for additional imaging at this time.   - He should attempt to cut back on salty foods and increase his activity levels. Goal to lose at least 20 pounds over the next year.     Corinne Praska, MS3     ATTENDING ATTESTATION    I was present with the medical student who participated in the service and in the documentation  of the note. I have verified the history and personally performed the physical exam and " medical  decision making. I agree with the assessment and plan of care as documented in the note.    Clinically remains in remission. I will not do scans at this time He should monitor the leg swelling which may be lymphedema related to previous lymph node  Involvement by lymphoma.Will arrange for him to be evaluated in cancer survivorship clincic    I spent a total of 25 minutes face to face with Julien Lee during today's office visit. Over 50% of this time was spent counseling the patient and coordinating the care regarding lymphoma  and 10 minutes preparing to see the patient and  care coordination       Fuad Vega MD  [phone]

## 2021-04-28 NOTE — LETTER
2021         RE: Julien Lee  [address] [facility] [address]        Dear Colleague,    Thank you for referring your patient, Julien Lee, to the [facility] BLOOD AND MARROW TRANSPLANT PROGRAM [place]. Please see a copy of my visit note below.    [facility] Progress Note   2021     ONCOLOGY SUMMARY:  Julien Lee is a 53-year-old with a history of follicular lymphoma diagnosed originally in 2002. He was considered stage DERIK with grade 1/3 follicular lymphoma.  Disease was noted in the lymph nodes within the abdomen and inguinal regions, plus interstitial involvement in the bone marrow.  Over the years, he has had disease at other sites, including above the diaphragm, neck, axillae, etc.  He has received multiple treatments including oral chlorambucil (- ), Rituximab (), chlorambucil again in  for a pleural effusion, R-CVP x 6 cycles from 2009-2010, radiation (2660 cGy) to the right neck in -2010 for a persistent local problem, and most recently 6 cycles of rituximab plus bendamustine from 10/2014 - 2015.  He has not required additional therapy over the past 6 years.     Mr. Lee's wife  in 2011 of a brain tumor.  He has subsequently raised their 3 children as a single parent.     HISTORY OF PRESENT ILLNESS:  Mr. Lee has been feeling well. He continues to work from home. A couple of days ago he noticed that his right ankle was slightly swollen. He does not recall injuring it in any way, and it is not warm, painful, or cause changes in sensation.  Other than this, he has not had any fevers or chills, abdominal pain, or early satiety. He has not noticed any swollen lymph nodes. He has gained weight over the course of the pandemic and is hoping to be more active moving forward in order to lose weight.   He received both doses of the Pfizer COVID vaccine and tolerated them well. Two of his children are living  "at home with him right now, and his youngest is in college at [employer]. His brother was just diagnosed with prostate cancer.    REVIEW OF SYSTEMS:  A 10-point review otherwise negative.      MEDICATIONS:  Aspirin 81mg daily     ALLERGIES:  None reported.      ROS: 12 pt ROS neg except as in HPI  PMH FH and SH: see note from April 2020    PHYSICAL EXAMINATION:    BP (!) 144/92   Pulse 117   Temp 98.7  F (37.1  C) (Tympanic)   Resp 16   Ht 1.854 m (6' 0.99\")   Wt 136.9 kg (301 lb 14.4 oz)   SpO2 98%   BMI 39.84 kg/m     GENERAL: alert, in no apparent distress; no palpable cervical, axillary, inguinal lymphadenopathy  HEENT:  no scleral icterus, moist mucous membranes   CARDIAC: regular rate and rhythm  RESPIRATORY: clear to auscultation bilaterally   ABDOMEN: nontender, nondistended, no hepatosplenomegaly  SKIN: no bruising or rash on visible skin  EXTREMITIES: dry skin with mild flaking on bilateral lower extremities, mild erythema on bilateral lower extremities R>L. Slight swelling of right ankle joint that is nontender to palpation. No pitting edema.  NEUROLOGIC: Cranial nerves 2-12 grossly intact.      ASSESSMENT AND PLAN:      1. Follicular lymphoma    2. Hypertension  Patient is now ~19 years following diagnosis of follicular lymphoma and is in CR. He has not received therapy since 03/2015. CT scans Oct 2019 show no significant changes. LDH is slightly elevated today, but this is likely related to handling of the specimen and is not significantly elevated from baseline over the prior few years. BP elevated slightly today, should follow with PCP.   - Referral to [facility] for follow-up in 6 months. I will see him in 12 months  - If his ankle swelling worsens or becomes painful, he should contact his PCP for consideration of lower extremity US. Otherwise no indication for additional imaging at this time.   - He should attempt to cut back on salty foods and increase his activity levels. Goal to " lose at least 20 pounds over the next year.     Corinne Praska, MS3     ATTENDING ATTESTATION    I was present with the medical student who participated in the service and in the documentation  of the note. I have verified the history and personally performed the physical exam and medical  decision making. I agree with the assessment and plan of care as documented in the note.    Clinically remains in remission. I will not do scans at this time He should monitor the leg swelling which may be lymphedema related to previous lymph node  Involvement by lymphoma.Will arrange for him to be evaluated in cancer survivorship clincic    I spent a total of 25 minutes face to face with Julien Lee during today's office visit. Over 50% of this time was spent counseling the patient and coordinating the care regarding lymphoma  and 10 minutes preparing to see the patient and  care coordination       Fuad Vega MD  [phone]        Again, thank you for allowing me to participate in the care of your patient.        Sincerely,        Fuad Vega MD

## 2021-04-28 NOTE — NURSING NOTE
"Oncology Rooming Note    April 28, 2021 12:54 PM   Julien Lee is a 55 year old male who presents for:    Chief Complaint   Patient presents with     Oncology Clinic Visit     [facility] RETURN - [facility]     Blood Draw     Labs drawn via VPT by RN in lab. VS taken.     Initial Vitals: BP (!) 144/92   Pulse 117   Temp 98.7  F (37.1  C) (Tympanic)   Resp 16   Ht 1.854 m (6' 0.99\")   Wt 136.9 kg (301 lb 14.4 oz)   SpO2 98%   BMI 39.84 kg/m   Estimated body mass index is 39.84 kg/m  as calculated from the following:    Height as of this encounter: 1.854 m (6' 0.99\").    Weight as of this encounter: 136.9 kg (301 lb 14.4 oz). Body surface area is 2.66 meters squared.  No Pain (0) Comment: Data Unavailable   No LMP for male patient.  Allergies reviewed: Yes  Medications reviewed: Yes    Medications: Medication refills not needed today.  Pharmacy name entered into Liveset:    [id number] [place], [place] - [address] [place] [address] PHARMACY [place], [place] - [phone]    Clinical concerns: No new concerns. Hakeem was notified.      Adan Mayfield LPN            "

## 2021-04-29 LAB
ALBUMIN SERPL ELPH-MCNC: 4.3 G/DL (ref 3.7–5.1)
ALPHA1 GLOB SERPL ELPH-MCNC: 0.3 G/DL (ref 0.2–0.4)
ALPHA2 GLOB SERPL ELPH-MCNC: 0.6 G/DL (ref 0.5–0.9)
B-GLOBULIN SERPL ELPH-MCNC: 0.7 G/DL (ref 0.6–1)
GAMMA GLOB SERPL ELPH-MCNC: 0.8 G/DL (ref 0.7–1.6)
IGA SERPL-MCNC: 67 MG/DL (ref 84–499)
IGG SERPL-MCNC: 863 MG/DL (ref 610–1616)
IGM SERPL-MCNC: 89 MG/DL (ref 35–242)
M PROTEIN SERPL ELPH-MCNC: 0 G/DL
PROT PATTERN SERPL ELPH-IMP: NORMAL

## 2021-09-05 ENCOUNTER — HEALTH MAINTENANCE LETTER (OUTPATIENT)
Age: 56
End: 2021-09-05

## 2021-12-26 ENCOUNTER — HEALTH MAINTENANCE LETTER (OUTPATIENT)
Age: 56
End: 2021-12-26

## 2022-04-18 DIAGNOSIS — C82.80 OTHER TYPE OF FOLLICULAR LYMPHOMA, UNSPECIFIED BODY REGION (H): Primary | ICD-10-CM

## 2022-04-21 NOTE — MR AVS SNAPSHOT
After Visit Summary   7/12/2017    Julien Lee    MRN: [id number]           Patient Information     Date Of Birth          1965        Visit Information        Provider Department      7/12/2017 3:30 PM Von Zaidi MD [facility]        Today's Diagnoses     Other type of follicular lymphoma, unspecified body region (H)    -  1       Follow-ups after your visit        Follow-up notes from your care team     Return in about 4 months (around 11/12/2017) for Physical Exam.      Your next 10 appointments already scheduled     Nov 15, 2017  3:30 PM CST   (Arrive by 3:15 PM)   Return Visit with Von Zaidi MD   [facility] ([facility])    [address]  2[address]  [facility] [address] [phone]              Who to contact     If you have questions or need follow up information about today's clinic visit or your schedule please contact [facility] directly at [phone].  Normal or non-critical lab and imaging results will be communicated to you by Megvii Inchart, letter or phone within 4 business days after the clinic has received the results. If you do not hear from us within 7 days, please contact the clinic through IdeaSquarest or phone. If you have a critical or abnormal lab result, we will notify you by phone as soon as possible.  Submit refill requests through Tray or call your pharmacy and they will forward the refill request to us. Please allow 3 business days for your refill to be completed.          Additional Information About Your Visit        Megvii Inchart Information     Tray gives you secure access to your electronic health record. If you see a primary care provider, you can also send messages to your care team and make appointments. If you have questions, please call your primary care clinic.  If you do not have a primary care provider, please call [phone] and they will assist  Rx Refill Note  Requested Prescriptions     Pending Prescriptions Disp Refills   • Aimovig 70 MG/ML prefilled syringe [Pharmacy Med Name: Aimovig Subcutaneous Solution Auto-injector 70 MG/ML] 1 mL 0     Sig: INJECT 1 ML UNDER THE SKIN INTO THE APPROPRIATE AREA AS DIRECTED ONE TIME FOR 1 DOSE   • traZODone (DESYREL) 100 MG tablet [Pharmacy Med Name: traZODone HCl Oral Tablet 100 MG] 180 tablet 0     Sig: TAKE 1 TABLET BY MOUTH TWO TIMES A DAY      Last office visit with prescribing clinician: 1/27/2022      Next office visit with prescribing clinician: Visit date not found            Alessandra Dunbar LPN  04/21/22, 14:18 EDT   "you.        Care EveryWhere ID     This is your Care EveryWhere ID. This could be used by other organizations to access your [place] medical records  [id number]        Your Vitals Were     Pulse Respirations Height Pulse Oximetry BMI (Body Mass Index)       93 14 1.874 m (6' 1.78\") 99% 31.46 kg/m2        Blood Pressure from Last 3 Encounters:   07/12/17 126/85   03/01/17 126/87   11/16/16 133/89    Weight from Last 3 Encounters:   07/12/17 110.5 kg (243 lb 9.6 oz)   03/01/17 124.2 kg (273 lb 12.8 oz)   11/16/16 122.4 kg (269 lb 13.5 oz)              Today, you had the following     No orders found for display       Primary Care Provider    Unknown Primary MD Caridad       No address on file        Equal Access to Services     [facility]: [place] tram [place], waaxmicheline bravo, qaybta kaalmamicheline posada, stephanie moura . So [facility] [phone].    ATENCIÓN: Si habla español, tiene a rivera disposición servicios gratuitos de asistencia lingüística. [facility] [phone].    We comply with applicable federal civil rights laws and [place] laws. We do not discriminate on the basis of race, color, national origin, age, disability sex, sexual orientation or gender identity.            Thank you!     Thank you for choosing [facility]  for your care. Our goal is always to provide you with excellent care. Hearing back from our patients is one way we can continue to improve our services. Please take a few minutes to complete the written survey that you may receive in the mail after your visit with us. Thank you!             Your Updated Medication List - Protect others around you: Learn how to safely use, store and throw away your medicines at www.disposemymeds.org.          This list is accurate as of: 7/12/17 11:59 PM.  Always use your most recent med list.                   Brand Name Dispense Instructions for use Diagnosis    ibuprofen 200 MG tablet    ADVIL/MOTRIN     " Take 200 mg by mouth    Other type of follicular lymphoma, unspecified body region (H)

## 2022-04-26 ENCOUNTER — OFFICE VISIT (OUTPATIENT)
Dept: TRANSPLANT | Facility: CLINIC | Age: 57
End: 2022-04-26
Attending: INTERNAL MEDICINE
Payer: COMMERCIAL

## 2022-04-26 ENCOUNTER — APPOINTMENT (OUTPATIENT)
Dept: LAB | Facility: CLINIC | Age: 57
End: 2022-04-26
Attending: INTERNAL MEDICINE
Payer: COMMERCIAL

## 2022-04-26 VITALS
HEIGHT: 73 IN | BODY MASS INDEX: 38.33 KG/M2 | DIASTOLIC BLOOD PRESSURE: 81 MMHG | WEIGHT: 289.2 LBS | HEART RATE: 113 BPM | OXYGEN SATURATION: 97 % | RESPIRATION RATE: 16 BRPM | SYSTOLIC BLOOD PRESSURE: 138 MMHG

## 2022-04-26 DIAGNOSIS — C82.80 OTHER TYPE OF FOLLICULAR LYMPHOMA, UNSPECIFIED BODY REGION (H): ICD-10-CM

## 2022-04-26 LAB
ALBUMIN SERPL-MCNC: 4.2 G/DL (ref 3.4–5)
ALP SERPL-CCNC: 98 U/L (ref 40–150)
ALT SERPL W P-5'-P-CCNC: 33 U/L (ref 0–70)
ANION GAP SERPL CALCULATED.3IONS-SCNC: 8 MMOL/L (ref 3–14)
AST SERPL W P-5'-P-CCNC: 20 U/L (ref 0–45)
BASOPHILS # BLD AUTO: 0.1 10E3/UL (ref 0–0.2)
BASOPHILS NFR BLD AUTO: 1 %
BILIRUB SERPL-MCNC: 0.6 MG/DL (ref 0.2–1.3)
BUN SERPL-MCNC: 20 MG/DL (ref 7–30)
CALCIUM SERPL-MCNC: 9.6 MG/DL (ref 8.5–10.1)
CHLORIDE BLD-SCNC: 109 MMOL/L (ref 94–109)
CO2 SERPL-SCNC: 23 MMOL/L (ref 20–32)
CREAT SERPL-MCNC: 1.26 MG/DL (ref 0.66–1.25)
CRP SERPL-MCNC: 6.2 MG/L (ref 0–8)
EOSINOPHIL # BLD AUTO: 0.2 10E3/UL (ref 0–0.7)
EOSINOPHIL NFR BLD AUTO: 2 %
ERYTHROCYTE [DISTWIDTH] IN BLOOD BY AUTOMATED COUNT: 11.9 % (ref 10–15)
ERYTHROCYTE [SEDIMENTATION RATE] IN BLOOD BY WESTERGREN METHOD: 4 MM/HR (ref 0–20)
GFR SERPL CREATININE-BSD FRML MDRD: 67 ML/MIN/1.73M2
GLUCOSE BLD-MCNC: 106 MG/DL (ref 70–99)
HCT VFR BLD AUTO: 46 % (ref 40–53)
HGB BLD-MCNC: 16.3 G/DL (ref 13.3–17.7)
IMM GRANULOCYTES # BLD: 0.1 10E3/UL
IMM GRANULOCYTES NFR BLD: 1 %
LDH SERPL L TO P-CCNC: 234 U/L (ref 85–227)
LYMPHOCYTES # BLD AUTO: 2 10E3/UL (ref 0.8–5.3)
LYMPHOCYTES NFR BLD AUTO: 27 %
MCH RBC QN AUTO: 30.9 PG (ref 26.5–33)
MCHC RBC AUTO-ENTMCNC: 35.4 G/DL (ref 31.5–36.5)
MCV RBC AUTO: 87 FL (ref 78–100)
MONOCYTES # BLD AUTO: 0.8 10E3/UL (ref 0–1.3)
MONOCYTES NFR BLD AUTO: 11 %
NEUTROPHILS # BLD AUTO: 4.3 10E3/UL (ref 1.6–8.3)
NEUTROPHILS NFR BLD AUTO: 58 %
NRBC # BLD AUTO: 0 10E3/UL
NRBC BLD AUTO-RTO: 0 /100
PLATELET # BLD AUTO: 213 10E3/UL (ref 150–450)
POTASSIUM BLD-SCNC: 4.1 MMOL/L (ref 3.4–5.3)
PROT SERPL-MCNC: 7.3 G/DL (ref 6.8–8.8)
RBC # BLD AUTO: 5.28 10E6/UL (ref 4.4–5.9)
RETICS # AUTO: 0.15 10E6/UL (ref 0.03–0.1)
RETICS/RBC NFR AUTO: 3 % (ref 0.5–2)
SODIUM SERPL-SCNC: 140 MMOL/L (ref 133–144)
TOTAL PROTEIN SERUM FOR ELP: 7 G/DL (ref 6.8–8.8)
TSH SERPL DL<=0.005 MIU/L-ACNC: 2.58 MU/L (ref 0.4–4)
WBC # BLD AUTO: 7.4 10E3/UL (ref 4–11)

## 2022-04-26 PROCEDURE — 85045 AUTOMATED RETICULOCYTE COUNT: CPT | Performed by: INTERNAL MEDICINE

## 2022-04-26 PROCEDURE — 86140 C-REACTIVE PROTEIN: CPT | Performed by: INTERNAL MEDICINE

## 2022-04-26 PROCEDURE — 84443 ASSAY THYROID STIM HORMONE: CPT | Performed by: INTERNAL MEDICINE

## 2022-04-26 PROCEDURE — 84165 PROTEIN E-PHORESIS SERUM: CPT | Mod: TC | Performed by: PATHOLOGY

## 2022-04-26 PROCEDURE — 84155 ASSAY OF PROTEIN SERUM: CPT | Performed by: INTERNAL MEDICINE

## 2022-04-26 PROCEDURE — 99213 OFFICE O/P EST LOW 20 MIN: CPT | Performed by: INTERNAL MEDICINE

## 2022-04-26 PROCEDURE — 36415 COLL VENOUS BLD VENIPUNCTURE: CPT | Performed by: INTERNAL MEDICINE

## 2022-04-26 PROCEDURE — G0463 HOSPITAL OUTPT CLINIC VISIT: HCPCS

## 2022-04-26 PROCEDURE — 85652 RBC SED RATE AUTOMATED: CPT | Performed by: INTERNAL MEDICINE

## 2022-04-26 PROCEDURE — 80053 COMPREHEN METABOLIC PANEL: CPT | Performed by: INTERNAL MEDICINE

## 2022-04-26 PROCEDURE — 83615 LACTATE (LD) (LDH) ENZYME: CPT | Performed by: INTERNAL MEDICINE

## 2022-04-26 PROCEDURE — 85025 COMPLETE CBC W/AUTO DIFF WBC: CPT | Performed by: INTERNAL MEDICINE

## 2022-04-26 ASSESSMENT — PAIN SCALES - GENERAL: PAINLEVEL: NO PAIN (0)

## 2022-04-26 NOTE — NURSING NOTE
"Oncology Rooming Note    April 26, 2022 11:19 AM   Julien Lee is a 56 year old male who presents for:    Chief Complaint   Patient presents with     Blood Draw     Labs drawn with  by rn.  VS taken.     Oncology Clinic Visit     [facility] - [facility]     Initial Vitals: /81 (BP Location: Right arm, Patient Position: Sitting, Cuff Size: Adult Large)   Pulse 113   Resp 16   Ht 1.854 m (6' 0.99\")   Wt 131.2 kg (289 lb 3.2 oz)   SpO2 97%   BMI 38.16 kg/m   Estimated body mass index is 38.16 kg/m  as calculated from the following:    Height as of this encounter: 1.854 m (6' 0.99\").    Weight as of this encounter: 131.2 kg (289 lb 3.2 oz). Body surface area is 2.6 meters squared.  No Pain (0) Comment: Data Unavailable   No LMP for male patient.  Allergies reviewed: Yes  Medications reviewed: Yes    Medications: Medication refills not needed today.  Pharmacy name entered into Contract Live:    [id number] [place], [place] - [address] [place] [address] PHARMACY [place], [place] - [phone]    Clinical concerns: No new concerns. Gary was notified.      Adan Mayfield LPN            "

## 2022-04-26 NOTE — NURSING NOTE
Chief Complaint   Patient presents with     Blood Draw     Labs drawn with  by rn.  VS taken.     Labs drawn with  by rn.  Pt tolerated well.  VS taken.  Pt checked in for next appt.    Yeimy Ny RN

## 2022-04-26 NOTE — LETTER
2022         RE: Julien Lee  [address] [facility] [address]        Dear Colleague,    Thank you for referring your patient, Julien Lee, to the [facility] BLOOD AND MARROW TRANSPLANT PROGRAM [place]. Please see a copy of my visit note below.    [facility] Progress Note   2022     ONCOLOGY SUMMARY:  Julien Lee is a 53-year-old with a history of follicular lymphoma diagnosed originally in 2002. He was considered stage DERIK with grade 1/3 follicular lymphoma.  Disease was noted in the lymph nodes within the abdomen and inguinal regions, plus interstitial involvement in the bone marrow.  Over the years, he has had disease at other sites, including above the diaphragm, neck, axillae, etc.  He has received multiple treatments including oral chlorambucil (- ), Rituximab (), chlorambucil again in  for a pleural effusion, R-CVP x 6 cycles from 2009-2010, radiation (2660 cGy) to the right neck in -2010 for a persistent local problem, and most recently 6 cycles of rituximab plus bendamustine from 10/2014 - 2015.  He has not required additional therapy over the past 7 years.     Mr. Lee's wife  in 2011 of a brain tumor.  He has subsequently raised their 3 children as a single parent.     HISTORY OF PRESENT ILLNESS:  Mr. Lee has been feeling well. He continues to work from home 2 days per week and in office 3 days.. He has not had any fevers or chills, abdominal pain, or early satiety. He has not noticed any swollen lymph nodes. He has lost 10 lb  He received both doses of the Pfizer COVID vaccine and tolerated them well. Not had booster.Two of his children are living at home with him right now, and his youngest is in college at [employer]. Had abcess  In skin over right scapula.Received antibiotics and hot pack and it resolved in early April    REVIEW OF SYSTEMS:  A 10-point review otherwise negative.      MEDICATIONS:   "Aspirin 81mg daily     ALLERGIES:  None reported.      ROS: 12 pt ROS neg except as in HPI  PMH FH and SH: see note from April 2021    PHYSICAL EXAMINATION:    /81 (BP Location: Right arm, Patient Position: Sitting, Cuff Size: Adult Large)   Pulse 113   Resp 16   Ht 1.854 m (6' 0.99\")   Wt 131.2 kg (289 lb 3.2 oz)   SpO2 97%   BMI 38.16 kg/m     GENERAL: alert, in no apparent distress; no palpable cervical, axillary, inguinal lymphadenopathy  HEENT:  no scleral icterus, moist mucous membranes   CARDIAC: regular rate and rhythm  RESPIRATORY: clear to auscultation bilaterally   ABDOMEN: nontender, nondistended, no hepatosplenomegaly  SKIN: no bruising or rash on visible skin Redness 2-3cm and scab over right scapula no tender or fluctulent  EXTREMITIES: dry skin No pitting edema.  NEUROLOGIC: Cranial nerves 2-12 grossly intact.       Latest Reference Range & Units 04/26/22 11:07   Sodium 133 - 144 mmol/L 140   Potassium 3.4 - 5.3 mmol/L 4.1   Chloride 94 - 109 mmol/L 109   Carbon Dioxide 20 - 32 mmol/L 23   Urea Nitrogen 7 - 30 mg/dL 20   Creatinine 0.66 - 1.25 mg/dL 1.26 (H)   GFR Estimate >60 mL/min/1.73m2 67 [1]   Calcium 8.5 - 10.1 mg/dL 9.6   Anion Gap 3 - 14 mmol/L 8   Albumin 3.4 - 5.0 g/dL 4.2   Protein Total 6.8 - 8.8 g/dL 7.3   Bilirubin Total 0.2 - 1.3 mg/dL 0.6   Alkaline Phosphatase 40 - 150 U/L 98   ALT 0 - 70 U/L 33   AST 0 - 45 U/L 20   CRP Inflammation 0.0 - 8.0 mg/L 6.2   Lactate Dehydrogenase 85 - 227 U/L 234 (H)   TSH 0.40 - 4.00 mU/L 2.58   Glucose 70 - 99 mg/dL 106 (H)   WBC 4.0 - 11.0 10e3/uL 7.4   Hemoglobin 13.3 - 17.7 g/dL 16.3   Hematocrit 40.0 - 53.0 % 46.0   Platelet Count 150 - 450 10e3/uL 213   RBC Count 4.40 - 5.90 10e6/uL 5.28   MCV 78 - 100 fL 87   MCH 26.5 - 33.0 pg 30.9   MCHC 31.5 - 36.5 g/dL 35.4   RDW 10.0 - 15.0 % 11.9   % Neutrophils % 58   % Lymphocytes % 27   % Monocytes % 11   % Eosinophils % 2   % Basophils % 1   Absolute Basophils 0.0 - 0.2 10e3/uL 0.1 "   Absolute Eosinophils 0.0 - 0.7 10e3/uL 0.2   Absolute Immature Granulocytes <=0.4 10e3/uL 0.1   Absolute Lymphocytes 0.8 - 5.3 10e3/uL 2.0   Absolute Monocytes 0.0 - 1.3 10e3/uL 0.8   % Immature Granulocytes % 1   Absolute Neutrophils 1.6 - 8.3 10e3/uL 4.3   Absolute NRBCs 10e3/uL 0.0   NRBCs per 100 WBC <1 /100 0   % Retic 0.5 - 2.0 % 3.0 (H)   Absolute Retic 0.025 - 0.095 10e6/uL 0.154 (H)   (H): Data is abnormally high  [1] Effective December 21, 2021 eGFRcr in adults is calculated using the 2021 CKD-EPI creatinine equation which includes age and gender (Rios et al., NEJ, DOI: 10.1056/RUZTqo4549866)  ASSESSMENT AND PLAN:      1. Follicular lymphoma    2. Hypertension  Patient is now ~20 years following diagnosis of follicular lymphoma and is in CR. He has not received therapy since 03/2015. CT scans Oct 2019 show no significant changes. LDH is slightly elevated today, but this is likely related to handling of the specimen and is not significantly elevated from baseline over the prior few years. BP  Slightly better  today, should follow with PCP.   - Referral to [facility] for follow-up in 6 months. I will see him in 12 months  -. Goal to lose at least 20 pounds over the next year.   3.Skin Abscess Back  Seems healed now    I spent a total of 25 minutes face to face with Julien Lee during today's office visit. Over 50% of this time was spent counseling the patient and coordinating the care regarding lymphoma  and 10 minutes preparing to see the patient and  care coordination           Again, thank you for allowing me to participate in the care of your patient.      Sincerely,    Fuad Vega MD

## 2022-04-26 NOTE — PROGRESS NOTES
[facility] Onc Clinic Progress Note   2022     ONCOLOGY SUMMARY:  Julien Lee is a 53-year-old with a history of follicular lymphoma diagnosed originally in 2002. He was considered stage DERIK with grade 1/3 follicular lymphoma.  Disease was noted in the lymph nodes within the abdomen and inguinal regions, plus interstitial involvement in the bone marrow.  Over the years, he has had disease at other sites, including above the diaphragm, neck, axillae, etc.  He has received multiple treatments including oral chlorambucil (- ), Rituximab (), chlorambucil again in  for a pleural effusion, R-CVP x 6 cycles from 2009-2010, radiation (2660 cGy) to the right neck in -2010 for a persistent local problem, and most recently 6 cycles of rituximab plus bendamustine from 10/2014 - 2015.  He has not required additional therapy over the past 7 years.     Mr. Lee's wife  in 2011 of a brain tumor.  He has subsequently raised their 3 children as a single parent.     HISTORY OF PRESENT ILLNESS:  Mr. Lee has been feeling well. He continues to work from home 2 days per week and in office 3 days.. He has not had any fevers or chills, abdominal pain, or early satiety. He has not noticed any swollen lymph nodes. He has lost 10 lb  He received both doses of the Pfizer COVID vaccine and tolerated them well. Not had booster.Two of his children are living at home with him right now, and his youngest is in college at [employer]. Had abcess  In skin over right scapula.Received antibiotics and hot pack and it resolved in early April    REVIEW OF SYSTEMS:  A 10-point review otherwise negative.      MEDICATIONS:  Aspirin 81mg daily     ALLERGIES:  None reported.      ROS: 12 pt ROS neg except as in HPI  PMH FH and SH: see note from 2021    PHYSICAL EXAMINATION:    /81 (BP Location: Right arm, Patient Position: Sitting, Cuff Size: Adult Large)   Pulse 113   Resp 16   Ht 1.854 m (6'  "0.99\")   Wt 131.2 kg (289 lb 3.2 oz)   SpO2 97%   BMI 38.16 kg/m     GENERAL: alert, in no apparent distress; no palpable cervical, axillary, inguinal lymphadenopathy  HEENT:  no scleral icterus, moist mucous membranes   CARDIAC: regular rate and rhythm  RESPIRATORY: clear to auscultation bilaterally   ABDOMEN: nontender, nondistended, no hepatosplenomegaly  SKIN: no bruising or rash on visible skin Redness 2-3cm and scab over right scapula no tender or fluctulent  EXTREMITIES: dry skin No pitting edema.  NEUROLOGIC: Cranial nerves 2-12 grossly intact.       Latest Reference Range & Units 04/26/22 11:07   Sodium 133 - 144 mmol/L 140   Potassium 3.4 - 5.3 mmol/L 4.1   Chloride 94 - 109 mmol/L 109   Carbon Dioxide 20 - 32 mmol/L 23   Urea Nitrogen 7 - 30 mg/dL 20   Creatinine 0.66 - 1.25 mg/dL 1.26 (H)   GFR Estimate >60 mL/min/1.73m2 67 [1]   Calcium 8.5 - 10.1 mg/dL 9.6   Anion Gap 3 - 14 mmol/L 8   Albumin 3.4 - 5.0 g/dL 4.2   Protein Total 6.8 - 8.8 g/dL 7.3   Bilirubin Total 0.2 - 1.3 mg/dL 0.6   Alkaline Phosphatase 40 - 150 U/L 98   ALT 0 - 70 U/L 33   AST 0 - 45 U/L 20   CRP Inflammation 0.0 - 8.0 mg/L 6.2   Lactate Dehydrogenase 85 - 227 U/L 234 (H)   TSH 0.40 - 4.00 mU/L 2.58   Glucose 70 - 99 mg/dL 106 (H)   WBC 4.0 - 11.0 10e3/uL 7.4   Hemoglobin 13.3 - 17.7 g/dL 16.3   Hematocrit 40.0 - 53.0 % 46.0   Platelet Count 150 - 450 10e3/uL 213   RBC Count 4.40 - 5.90 10e6/uL 5.28   MCV 78 - 100 fL 87   MCH 26.5 - 33.0 pg 30.9   MCHC 31.5 - 36.5 g/dL 35.4   RDW 10.0 - 15.0 % 11.9   % Neutrophils % 58   % Lymphocytes % 27   % Monocytes % 11   % Eosinophils % 2   % Basophils % 1   Absolute Basophils 0.0 - 0.2 10e3/uL 0.1   Absolute Eosinophils 0.0 - 0.7 10e3/uL 0.2   Absolute Immature Granulocytes <=0.4 10e3/uL 0.1   Absolute Lymphocytes 0.8 - 5.3 10e3/uL 2.0   Absolute Monocytes 0.0 - 1.3 10e3/uL 0.8   % Immature Granulocytes % 1   Absolute Neutrophils 1.6 - 8.3 10e3/uL 4.3   Absolute NRBCs 10e3/uL 0.0   NRBCs " per 100 WBC <1 /100 0   % Retic 0.5 - 2.0 % 3.0 (H)   Absolute Retic 0.025 - 0.095 10e6/uL 0.154 (H)   (H): Data is abnormally high  [1] Effective December 21, 2021 eGFRcr in adults is calculated using the 2021 CKD-EPI creatinine equation which includes age and gender (Rios espana al., NE, DOI: 10.1056/HQZZmp5906751)  ASSESSMENT AND PLAN:      1. Follicular lymphoma    2. Hypertension  Patient is now ~20 years following diagnosis of follicular lymphoma and is in CR. He has not received therapy since 03/2015. CT scans Oct 2019 show no significant changes. LDH is slightly elevated today, but this is likely related to handling of the specimen and is not significantly elevated from baseline over the prior few years. BP  Slightly better  today, should follow with PCP.   - Referral to [facility] for follow-up in 6 months. I will see him in 12 months  -. Goal to lose at least 20 pounds over the next year.   3.Skin Abscess Back  Seems healed now    I spent a total of 25 minutes face to face with Julien Lee during today's office visit. Over 50% of this time was spent counseling the patient and coordinating the care regarding lymphoma  and 10 minutes preparing to see the patient and  care coordination       Fuad Vega MD  [phone]

## 2022-04-27 LAB
ALBUMIN SERPL ELPH-MCNC: 4.6 G/DL (ref 3.7–5.1)
ALPHA1 GLOB SERPL ELPH-MCNC: 0.3 G/DL (ref 0.2–0.4)
ALPHA2 GLOB SERPL ELPH-MCNC: 0.6 G/DL (ref 0.5–0.9)
B-GLOBULIN SERPL ELPH-MCNC: 0.7 G/DL (ref 0.6–1)
GAMMA GLOB SERPL ELPH-MCNC: 0.8 G/DL (ref 0.7–1.6)
M PROTEIN SERPL ELPH-MCNC: 0 G/DL
PROT PATTERN SERPL ELPH-IMP: NORMAL

## 2022-04-27 PROCEDURE — 84165 PROTEIN E-PHORESIS SERUM: CPT | Mod: 26

## 2022-10-03 ENCOUNTER — ONCOLOGY SURVIVORSHIP VISIT (OUTPATIENT)
Dept: ONCOLOGY | Facility: CLINIC | Age: 57
End: 2022-10-03
Attending: NURSE PRACTITIONER
Payer: COMMERCIAL

## 2022-10-03 VITALS
WEIGHT: 297.2 LBS | OXYGEN SATURATION: 98 % | BODY MASS INDEX: 39.22 KG/M2 | DIASTOLIC BLOOD PRESSURE: 89 MMHG | HEART RATE: 108 BPM | TEMPERATURE: 98.1 F | SYSTOLIC BLOOD PRESSURE: 149 MMHG

## 2022-10-03 DIAGNOSIS — C82.80 OTHER TYPE OF FOLLICULAR LYMPHOMA, UNSPECIFIED BODY REGION (H): Primary | ICD-10-CM

## 2022-10-03 PROCEDURE — 99215 OFFICE O/P EST HI 40 MIN: CPT | Performed by: NURSE PRACTITIONER

## 2022-10-03 PROCEDURE — G0463 HOSPITAL OUTPT CLINIC VISIT: HCPCS

## 2022-10-03 RX ORDER — TRIAMCINOLONE ACETONIDE 1 MG/G
CREAM TOPICAL
COMMUNITY
Start: 2022-08-02

## 2022-10-03 ASSESSMENT — PAIN SCALES - GENERAL: PAINLEVEL: NO PAIN (0)

## 2022-10-03 NOTE — PROGRESS NOTES
[facility]  Date of visit: 10/03/22      Reason for Visit: follow up follicular lymphoma      Oncology HPI:   Julien Lee is a 53-year-old with a history of follicular lymphoma diagnosed originally in 07/2002. He was considered stage DERIK with grade 1/3 follicular lymphoma.  Disease was noted in the lymph nodes within the abdomen and inguinal regions, plus interstitial involvement in the bone marrow.  Over the years, he has had disease at other sites, including above the diaphragm, neck, axillae, etc.  He has received multiple treatments including oral chlorambucil (2002- 2003), Rituximab (01-02/2008), chlorambucil again in 2008 for a pleural effusion, R-CVP x 6 cycles from 12/2009-03/2010, radiation (2660 cGy) to the right neck in 06-07/2010 for a persistent local problem, and most recently 6 cycles of rituximab plus bendamustine from 10/2014 - 03/2015.  He has not required additional therapy over the past 7 years.        Interval history:   Isaías is here for follow up. He is unsure why he is here today. He is feeling pretty well. No new complaints. He has gained some weight which he feels is slowing him down some. Energy is stable. He feels he may have sleep apnea as his brothers all do. No recent infections. He had an infected cyst on his back which has healed. Had covid back in July-- fully recovered. No fevers or night sweats. No concerning swelling or bumps. ROS otherwise neg.     Isaías expressed some health maintenance concerns which I recommend establishing with a primary care-- diabetes screening, possible sleep apnea, weight management.               Current Outpatient Medications   Medication Sig Dispense Refill     aspirin (ASA) 81 MG chewable tablet        triamcinolone (KENALOG) 0.1 % external cream APPLY THIN LAYER TOPICALLY TO THE AFFECTED AREA TWICE DAILY         Allergies   Allergen Reactions     Nkda [No Known Drug Allergies]          Physical Exam:  BP (!)  149/89 (BP Location: Right arm, Patient Position: Sitting, Cuff Size: Adult Regular)   Pulse 108   Temp 98.1  F (36.7  C) (Oral)   Wt 134.8 kg (297 lb 3.2 oz)   SpO2 98%   BMI 39.22 kg/m    General: No acute distress.  HEENT: EOMI, PERRL. Sclerae are anicteric. Oral mucosa is pink and moist with no lesions or thrush.   Lymph: Neck is supple with no lymphadenopathy in the cervical or supraclavicular areas.   Heart: Regular rate and rhythm.   Lungs: Clear to auscultation bilaterally.   Abdomen: Large, rounded. Bowel sounds present, soft, nontender with no palpable hepatosplenomegaly or masses.   Extremities: No lower extremity edema noted bilaterally.   Neuro: Alert and oriented x3, CN grossly intact, steady gait  Skin: No rashes, petechiae, or bruising noted on exposed skin.      Labs: none obtained at this visit      Imaging: n/a      Impression/plan:        # Follicular lymphoma    Patient is now ~20 years following diagnosis of follicular lymphoma and is in CR. He has not received therapy since 03/2015. CT scans Oct 2019 show no significant changes.  - RTC to see Dr Abbasi in 6 months      # Hypertension  BP slightly better today, should follow with PCP.   - Goal to lose at least 20 pounds over the next year.       Placed referrals for internal medicine/ PCP as well as adult sleep medicine.           Melisa Wise ACNP-BC    40 minutes spent on the date of the encounter doing chart review, review of test results, interpretation of tests, patient visit and documentation

## 2022-10-03 NOTE — NURSING NOTE
"Oncology Rooming Note    October 3, 2022 11:12 AM   Julien Lee is a 56 year old male who presents for:    Chief Complaint   Patient presents with     Oncology Clinic Visit     Lymphoma      Initial Vitals: BP (!) 149/89 (BP Location: Right arm, Patient Position: Sitting, Cuff Size: Adult Regular)   Pulse 108   Temp 98.1  F (36.7  C) (Oral)   Wt 134.8 kg (297 lb 3.2 oz)   SpO2 98%   BMI 39.22 kg/m   Estimated body mass index is 39.22 kg/m  as calculated from the following:    Height as of 4/26/22: 1.854 m (6' 0.99\").    Weight as of this encounter: 134.8 kg (297 lb 3.2 oz). Body surface area is 2.63 meters squared.  No Pain (0) Comment: Data Unavailable   No LMP for male patient.  Allergies reviewed: Yes  Medications reviewed: Yes    Medications: Medication refills not needed today.  Pharmacy name entered into [employer]:    [id number] [place], [place] [facility] [address] [place] [address] PHARMACY [place], [place] - [address] [facility] [phone]    Clinical concerns: none.       Nate Waite"

## 2022-10-23 ENCOUNTER — HEALTH MAINTENANCE LETTER (OUTPATIENT)
Age: 57
End: 2022-10-23

## 2023-04-02 ENCOUNTER — HEALTH MAINTENANCE LETTER (OUTPATIENT)
Age: 58
End: 2023-04-02

## 2023-06-19 NOTE — PROGRESS NOTES
[facility] Progress Note        ONCOLOGY SUMMARY:  Julien Lee is a 53-year-old with a history of follicular lymphoma diagnosed originally in 2002. He was considered stage DERIK with grade 1/3 follicular lymphoma.  Disease was noted in the lymph nodes within the abdomen and inguinal regions, plus interstitial involvement in the bone marrow.  Over the years, he has had disease at other sites, including above the diaphragm, neck, axillae, etc.  He has received multiple treatments including oral chlorambucil (- ), Rituximab (), chlorambucil again in  for a pleural effusion, R-CVP x 6 cycles from 2009-2010, radiation (2660 cGy) to the right neck in -2010 for a persistent local problem, and most recently 6 cycles of rituximab plus bendamustine from 10/2014 - 2015.  He has not required additional therapy over the past 7 years.     Mr. Lee's wife  in 2011 of a brain tumor.  He has subsequently raised their 3 children as a single parent.     HISTORY OF PRESENT ILLNESS:  Mr. Lee has been feeling well. He continues to work from home 2 days per week and in office 3 days.. He has not had any fevers or chills, abdominal pain, or early satiety. He has not noticed any swollen lymph nodes.but wonders about a node in left neck.  Kids are dong well youngest daughter is doing an   He did have COVID last year and took paxlovid and recovered well.He had another URI last year  He was seen in Survivorship clinic and referred for a sleep study but had to cancel the appt   No terrible snoring but two brothers have sleep apnea.Weight continue to be an issue  REVIEW OF SYSTEMS:  A 10-point review otherwise negative.      MEDICATIONS:  Aspirin 81mg daily     ALLERGIES:  None reported.      ROS: 12 pt ROS neg except as in HPI  PMH FH and SH: see note from 2022    PHYSICAL EXAMINATION:    BP (!) 155/73 (BP Location: Left arm, Patient Position: Sitting,  Cuff Size: Adult Large)   Pulse 98   Temp 98.5  F (36.9  C) (Oral)   Resp 16   Wt 137.4 kg (303 lb)   SpO2 96%   BMI 39.98 kg/m     GENERAL: Obese alert, in no apparent distress; no palpable cervical, axillary, inguinal lymphadenopathy  HEENT:  no scleral icterus, moist mucous membranes   CARDIAC: regular rate and rhythm  RESPIRATORY: clear to auscultation bilaterally   ABDOMEN: Obese BS ok Umbilical hernia no hepatosplenomegaly  SKIN: no bruising or rash on visible skin Redness 2-3cm and scab over right scapula no tender or fluctulent  EXTREMITIES: dry skin No pitting edema.  NEUROLOGIC: Cranial nerves 2-12 grossly intact.        ASSESSMENT AND PLAN:      1. Follicular lymphoma    2. Hypertension  3. Obesity  4. Possible sleep apnea  Patient is now ~21 years following diagnosis of follicular lymphoma and is in CR. He has not received therapy since 03/2015. CT scans Oct 2019 show no significant changes. No nodes no B sx today  I am concerned about his obesity Will refer to bariatric surgery weight management  Will also place sleep medicine referral in  Needs better BP control  Decrease daily Na intake       I will see him in 12 months  -.    I spent a total of 30 minutes face to face with Julien Lee during today's office visit. Over 50% of this time was spent counseling the patient and coordinating the care regarding lymphoma  and 10 minutes preparing to see the patient and  care coordination       Fuad Vgea MD  [phone]   Latest Reference Range & Units 06/20/23 11:03   WBC 4.0 - 11.0 10e3/uL 7.5   Hemoglobin 13.3 - 17.7 g/dL 15.9   Hematocrit 40.0 - 53.0 % 43.5   Platelet Count 150 - 450 10e3/uL 196   RBC Count 4.40 - 5.90 10e6/uL 5.03   MCV 78 - 100 fL 87   MCH 26.5 - 33.0 pg 31.6   MCHC 31.5 - 36.5 g/dL 36.6 (H)   RDW 10.0 - 15.0 % 12.0   % Neutrophils % 58   % Lymphocytes % 27   % Monocytes % 11   % Eosinophils % 2   % Basophils % 1   Absolute Basophils 0.0 - 0.2 10e3/uL 0.1    Absolute Eosinophils 0.0 - 0.7 10e3/uL 0.2   Absolute Immature Granulocytes <=0.4 10e3/uL 0.1   Absolute Lymphocytes 0.8 - 5.3 10e3/uL 2.0   Absolute Monocytes 0.0 - 1.3 10e3/uL 0.9   % Immature Granulocytes % 1   Absolute Neutrophils 1.6 - 8.3 10e3/uL 4.4   Absolute NRBCs 10e3/uL 0.0   NRBCs per 100 WBC <1 /100 0   % Retic 0.5 - 2.0 % 2.9 (H)   Absolute Retic 0.025 - 0.095 10e6/uL 0.147 (H)   Sed Rate 0 - 20 mm/hr 2   (H): Data is abnormally high

## 2023-06-20 ENCOUNTER — PATIENT OUTREACH (OUTPATIENT)
Dept: ONCOLOGY | Facility: CLINIC | Age: 58
End: 2023-06-20

## 2023-06-20 ENCOUNTER — APPOINTMENT (OUTPATIENT)
Dept: LAB | Facility: CLINIC | Age: 58
End: 2023-06-20
Attending: INTERNAL MEDICINE
Payer: COMMERCIAL

## 2023-06-20 ENCOUNTER — ONCOLOGY VISIT (OUTPATIENT)
Dept: TRANSPLANT | Facility: CLINIC | Age: 58
End: 2023-06-20
Attending: INTERNAL MEDICINE
Payer: COMMERCIAL

## 2023-06-20 VITALS
OXYGEN SATURATION: 96 % | TEMPERATURE: 98.5 F | BODY MASS INDEX: 39.98 KG/M2 | WEIGHT: 303 LBS | SYSTOLIC BLOOD PRESSURE: 155 MMHG | HEART RATE: 98 BPM | DIASTOLIC BLOOD PRESSURE: 73 MMHG | RESPIRATION RATE: 16 BRPM

## 2023-06-20 DIAGNOSIS — C82.80 OTHER TYPE OF FOLLICULAR LYMPHOMA, UNSPECIFIED BODY REGION (H): ICD-10-CM

## 2023-06-20 LAB
ALBUMIN SERPL BCG-MCNC: 4.6 G/DL (ref 3.5–5.2)
ALP SERPL-CCNC: 100 U/L (ref 40–129)
ALT SERPL W P-5'-P-CCNC: 24 U/L (ref 0–70)
ANION GAP SERPL CALCULATED.3IONS-SCNC: 10 MMOL/L (ref 7–15)
AST SERPL W P-5'-P-CCNC: 24 U/L (ref 0–45)
BASOPHILS # BLD AUTO: 0.1 10E3/UL (ref 0–0.2)
BASOPHILS NFR BLD AUTO: 1 %
BILIRUB SERPL-MCNC: 0.4 MG/DL
BUN SERPL-MCNC: 14.5 MG/DL (ref 6–20)
CALCIUM SERPL-MCNC: 9.9 MG/DL (ref 8.6–10)
CHLORIDE SERPL-SCNC: 106 MMOL/L (ref 98–107)
CREAT SERPL-MCNC: 1.06 MG/DL (ref 0.67–1.17)
CRP SERPL-MCNC: 4.86 MG/L
DEPRECATED HCO3 PLAS-SCNC: 24 MMOL/L (ref 22–29)
EOSINOPHIL # BLD AUTO: 0.2 10E3/UL (ref 0–0.7)
EOSINOPHIL NFR BLD AUTO: 2 %
ERYTHROCYTE [DISTWIDTH] IN BLOOD BY AUTOMATED COUNT: 12 % (ref 10–15)
ERYTHROCYTE [SEDIMENTATION RATE] IN BLOOD BY WESTERGREN METHOD: 2 MM/HR (ref 0–20)
GFR SERPL CREATININE-BSD FRML MDRD: 81 ML/MIN/1.73M2
GLUCOSE SERPL-MCNC: 95 MG/DL (ref 70–99)
HBA1C MFR BLD: 5.3 %
HCT VFR BLD AUTO: 43.5 % (ref 40–53)
HGB BLD-MCNC: 15.9 G/DL (ref 13.3–17.7)
IMM GRANULOCYTES # BLD: 0.1 10E3/UL
IMM GRANULOCYTES NFR BLD: 1 %
LYMPHOCYTES # BLD AUTO: 2 10E3/UL (ref 0.8–5.3)
LYMPHOCYTES NFR BLD AUTO: 27 %
MCH RBC QN AUTO: 31.6 PG (ref 26.5–33)
MCHC RBC AUTO-ENTMCNC: 36.6 G/DL (ref 31.5–36.5)
MCV RBC AUTO: 87 FL (ref 78–100)
MONOCYTES # BLD AUTO: 0.9 10E3/UL (ref 0–1.3)
MONOCYTES NFR BLD AUTO: 11 %
NEUTROPHILS # BLD AUTO: 4.4 10E3/UL (ref 1.6–8.3)
NEUTROPHILS NFR BLD AUTO: 58 %
NRBC # BLD AUTO: 0 10E3/UL
NRBC BLD AUTO-RTO: 0 /100
PLATELET # BLD AUTO: 196 10E3/UL (ref 150–450)
POTASSIUM SERPL-SCNC: 4 MMOL/L (ref 3.4–5.3)
PROT SERPL-MCNC: 7.1 G/DL (ref 6.4–8.3)
RBC # BLD AUTO: 5.03 10E6/UL (ref 4.4–5.9)
RETICS # AUTO: 0.15 10E6/UL (ref 0.03–0.1)
RETICS/RBC NFR AUTO: 2.9 % (ref 0.5–2)
SODIUM SERPL-SCNC: 140 MMOL/L (ref 136–145)
TOTAL PROTEIN SERUM FOR ELP: 6.9 G/DL (ref 6.4–8.3)
TSH SERPL DL<=0.005 MIU/L-ACNC: 3.46 UIU/ML (ref 0.3–4.2)
WBC # BLD AUTO: 7.5 10E3/UL (ref 4–11)

## 2023-06-20 PROCEDURE — 80053 COMPREHEN METABOLIC PANEL: CPT | Performed by: INTERNAL MEDICINE

## 2023-06-20 PROCEDURE — 84165 PROTEIN E-PHORESIS SERUM: CPT | Mod: TC | Performed by: PATHOLOGY

## 2023-06-20 PROCEDURE — 84443 ASSAY THYROID STIM HORMONE: CPT | Performed by: INTERNAL MEDICINE

## 2023-06-20 PROCEDURE — 84165 PROTEIN E-PHORESIS SERUM: CPT | Mod: 26

## 2023-06-20 PROCEDURE — 85652 RBC SED RATE AUTOMATED: CPT | Performed by: INTERNAL MEDICINE

## 2023-06-20 PROCEDURE — 85045 AUTOMATED RETICULOCYTE COUNT: CPT | Performed by: INTERNAL MEDICINE

## 2023-06-20 PROCEDURE — G0463 HOSPITAL OUTPT CLINIC VISIT: HCPCS | Performed by: INTERNAL MEDICINE

## 2023-06-20 PROCEDURE — 83036 HEMOGLOBIN GLYCOSYLATED A1C: CPT | Performed by: INTERNAL MEDICINE

## 2023-06-20 PROCEDURE — 36415 COLL VENOUS BLD VENIPUNCTURE: CPT | Performed by: INTERNAL MEDICINE

## 2023-06-20 PROCEDURE — 85025 COMPLETE CBC W/AUTO DIFF WBC: CPT | Performed by: INTERNAL MEDICINE

## 2023-06-20 PROCEDURE — 99214 OFFICE O/P EST MOD 30 MIN: CPT | Performed by: INTERNAL MEDICINE

## 2023-06-20 PROCEDURE — 84155 ASSAY OF PROTEIN SERUM: CPT | Mod: 91 | Performed by: INTERNAL MEDICINE

## 2023-06-20 PROCEDURE — 86140 C-REACTIVE PROTEIN: CPT | Performed by: INTERNAL MEDICINE

## 2023-06-20 ASSESSMENT — PAIN SCALES - GENERAL: PAINLEVEL: NO PAIN (0)

## 2023-06-20 NOTE — LETTER
2023         RE: Julien Lee  [address] [id number]        Dear Colleague,    Thank you for referring your patient, Julien Lee, to the [facility] [place]. Please see a copy of my visit note below.    [facility] Progress Note        ONCOLOGY SUMMARY:  Julien Lee is a 53-year-old with a history of follicular lymphoma diagnosed originally in 2002. He was considered stage DERIK with grade 1/3 follicular lymphoma.  Disease was noted in the lymph nodes within the abdomen and inguinal regions, plus interstitial involvement in the bone marrow.  Over the years, he has had disease at other sites, including above the diaphragm, neck, axillae, etc.  He has received multiple treatments including oral chlorambucil (- ), Rituximab (), chlorambucil again in  for a pleural effusion, R-CVP x 6 cycles from 2009-2010, radiation (2660 cGy) to the right neck in -2010 for a persistent local problem, and most recently 6 cycles of rituximab plus bendamustine from 10/2014 - 2015.  He has not required additional therapy over the past 7 years.     Mr. Lee's wife  in 2011 of a brain tumor.  He has subsequently raised their 3 children as a single parent.     HISTORY OF PRESENT ILLNESS:  Mr. Lee has been feeling well. He continues to work from home 2 days per week and in office 3 days.. He has not had any fevers or chills, abdominal pain, or early satiety. He has not noticed any swollen lymph nodes.but wonders about a node in left neck.  Kids are dong well youngest daughter is doing an   He did have COVID last year and took paxlovid and recovered well.He had another URI last year  He was seen in Survivorship clinic and referred for a sleep study but had to cancel the appt   No terrible snoring but two brothers have sleep apnea.Weight continue to be an issue  REVIEW OF  SYSTEMS:  A 10-point review otherwise negative.      MEDICATIONS:  Aspirin 81mg daily     ALLERGIES:  None reported.      ROS: 12 pt ROS neg except as in HPI  PMH FH and SH: see note from April 2022    PHYSICAL EXAMINATION:    BP (!) 155/73 (BP Location: Left arm, Patient Position: Sitting, Cuff Size: Adult Large)   Pulse 98   Temp 98.5  F (36.9  C) (Oral)   Resp 16   Wt 137.4 kg (303 lb)   SpO2 96%   BMI 39.98 kg/m     GENERAL: Obese alert, in no apparent distress; no palpable cervical, axillary, inguinal lymphadenopathy  HEENT:  no scleral icterus, moist mucous membranes   CARDIAC: regular rate and rhythm  RESPIRATORY: clear to auscultation bilaterally   ABDOMEN: Obese BS ok Umbilical hernia no hepatosplenomegaly  SKIN: no bruising or rash on visible skin Redness 2-3cm and scab over right scapula no tender or fluctulent  EXTREMITIES: dry skin No pitting edema.  NEUROLOGIC: Cranial nerves 2-12 grossly intact.        ASSESSMENT AND PLAN:      1. Follicular lymphoma    2. Hypertension  3. Obesity  4. Possible sleep apnea  Patient is now ~21 years following diagnosis of follicular lymphoma and is in CR. He has not received therapy since 03/2015. CT scans Oct 2019 show no significant changes. No nodes no B sx today  I am concerned about his obesity Will refer to bariatric surgery weight management  Will also place sleep medicine referral in  Needs better BP control  Decrease daily Na intake       I will see him in 12 months  -.    I spent a total of 30 minutes face to face with Julien Lee during today's office visit. Over 50% of this time was spent counseling the patient and coordinating the care regarding lymphoma  and 10 minutes preparing to see the patient and  care coordination       Fuad eVga MD  [phone]   Latest Reference Range & Units 06/20/23 11:03   WBC 4.0 - 11.0 10e3/uL 7.5   Hemoglobin 13.3 - 17.7 g/dL 15.9   Hematocrit 40.0 - 53.0 % 43.5   Platelet Count 150 - 450 10e3/uL 196    RBC Count 4.40 - 5.90 10e6/uL 5.03   MCV 78 - 100 fL 87   MCH 26.5 - 33.0 pg 31.6   MCHC 31.5 - 36.5 g/dL 36.6 (H)   RDW 10.0 - 15.0 % 12.0   % Neutrophils % 58   % Lymphocytes % 27   % Monocytes % 11   % Eosinophils % 2   % Basophils % 1   Absolute Basophils 0.0 - 0.2 10e3/uL 0.1   Absolute Eosinophils 0.0 - 0.7 10e3/uL 0.2   Absolute Immature Granulocytes <=0.4 10e3/uL 0.1   Absolute Lymphocytes 0.8 - 5.3 10e3/uL 2.0   Absolute Monocytes 0.0 - 1.3 10e3/uL 0.9   % Immature Granulocytes % 1   Absolute Neutrophils 1.6 - 8.3 10e3/uL 4.4   Absolute NRBCs 10e3/uL 0.0   NRBCs per 100 WBC <1 /100 0   % Retic 0.5 - 2.0 % 2.9 (H)   Absolute Retic 0.025 - 0.095 10e6/uL 0.147 (H)   Sed Rate 0 - 20 mm/hr 2   (H): Data is abnormally high          Fuad Vega MD

## 2023-06-20 NOTE — NURSING NOTE
"Oncology Rooming Note    June 20, 2023 11:17 AM   Julien Lee is a 57 year old male who presents for:    Chief Complaint   Patient presents with     Blood Draw     Vitals taken, labs drawn, checked into next appt     Oncology Clinic Visit     Return visit for folicular lymphoma.      Initial Vitals: BP (!) 155/73 (BP Location: Left arm, Patient Position: Sitting, Cuff Size: Adult Large)   Pulse 98   Temp 98.5  F (36.9  C) (Oral)   Resp 16   Wt 137.4 kg (303 lb)   SpO2 96%   BMI 39.98 kg/m   Estimated body mass index is 39.98 kg/m  as calculated from the following:    Height as of 4/26/22: 1.854 m (6' 0.99\").    Weight as of this encounter: 137.4 kg (303 lb). Body surface area is 2.66 meters squared.  No Pain (0) Comment: Data Unavailable   No LMP for male patient.  Allergies reviewed: Yes  Medications reviewed: Yes    Medications: Medication refills not needed today.  Pharmacy name entered into [employer]:    [id number] [place], [place] - [address] [place] [address] PHARMACY [place], [place] - [address] [facility] [phone]    Clinical concerns:    Kerwin Da Silva            "

## 2023-06-20 NOTE — PROGRESS NOTES
[facility]: Cancer Care Initial Note                                    Discussion with Patient:                                                      RN met with pt, Isaías today. Introduced self and role of RN Care Coordinator at [facility]. Provided my contact information, [facility] phone number (which has options to talk with a Nurse available 24/7 - triage and RNCC via this option during business hours).   Discussed use of MyChart including to not use it for symptoms and time expectations.      Assessment:                                                      Initial  Current living arrangement:: I live in a private home with family (Pt lives with grown children (3).  [place]ives [place] hill so 5 steps to front door; from garage full flight.  Home is split level (with tuck under garage).)  Informal Support system:: Children  Bed or wheelchair confined:: No  Mobility Status: Independent  Transportation means:: Regular car;Public transportation    Completed learning assessment with pt.    Intervention/Education provided during outreach:                                                       See above.  Pt completed consent to communicate (authorization to discuss PHI form) so we can speak with adult daughter, Brit @ [phone].  Gave form to BOH team to enter into Epic.    Signature:  Irina Young, RN, OCN

## 2023-06-20 NOTE — NURSING NOTE
Chief Complaint   Patient presents with     Blood Draw     Vitals taken, labs drawn, checked into next appt     BP (!) 155/73 (BP Location: Left arm, Patient Position: Sitting, Cuff Size: Adult Large)   Pulse 98   Temp 98.5  F (36.9  C) (Oral)   Resp 16   Wt 137.4 kg (303 lb)   SpO2 96%   BMI 39.98 kg/m    Amos Hunter RN on 6/20/2023 at 11:00 AM

## 2023-06-21 LAB
ALBUMIN SERPL ELPH-MCNC: 4.5 G/DL (ref 3.7–5.1)
ALPHA1 GLOB SERPL ELPH-MCNC: 0.3 G/DL (ref 0.2–0.4)
ALPHA2 GLOB SERPL ELPH-MCNC: 0.6 G/DL (ref 0.5–0.9)
B-GLOBULIN SERPL ELPH-MCNC: 0.7 G/DL (ref 0.6–1)
GAMMA GLOB SERPL ELPH-MCNC: 0.8 G/DL (ref 0.7–1.6)
M PROTEIN SERPL ELPH-MCNC: 0 G/DL
PROT PATTERN SERPL ELPH-IMP: NORMAL

## 2023-09-11 ASSESSMENT — SLEEP AND FATIGUE QUESTIONNAIRES
HOW LIKELY ARE YOU TO NOD OFF OR FALL ASLEEP WHILE SITTING INACTIVE IN A PUBLIC PLACE: WOULD NEVER DOZE
HOW LIKELY ARE YOU TO NOD OFF OR FALL ASLEEP WHILE SITTING AND TALKING TO SOMEONE: WOULD NEVER DOZE
HOW LIKELY ARE YOU TO NOD OFF OR FALL ASLEEP WHILE WATCHING TV: SLIGHT CHANCE OF DOZING
HOW LIKELY ARE YOU TO NOD OFF OR FALL ASLEEP WHILE SITTING AND READING: SLIGHT CHANCE OF DOZING
HOW LIKELY ARE YOU TO NOD OFF OR FALL ASLEEP WHILE LYING DOWN TO REST IN THE AFTERNOON WHEN CIRCUMSTANCES PERMIT: SLIGHT CHANCE OF DOZING
HOW LIKELY ARE YOU TO NOD OFF OR FALL ASLEEP WHEN YOU ARE A PASSENGER IN A CAR FOR AN HOUR WITHOUT A BREAK: WOULD NEVER DOZE
HOW LIKELY ARE YOU TO NOD OFF OR FALL ASLEEP WHILE SITTING QUIETLY AFTER LUNCH WITHOUT ALCOHOL: SLIGHT CHANCE OF DOZING
HOW LIKELY ARE YOU TO NOD OFF OR FALL ASLEEP IN A CAR, WHILE STOPPED FOR A FEW MINUTES IN TRAFFIC: WOULD NEVER DOZE

## 2023-09-12 ENCOUNTER — VIRTUAL VISIT (OUTPATIENT)
Dept: SLEEP MEDICINE | Facility: CLINIC | Age: 58
End: 2023-09-12
Attending: INTERNAL MEDICINE
Payer: COMMERCIAL

## 2023-09-12 VITALS — HEIGHT: 74 IN | BODY MASS INDEX: 38.5 KG/M2 | WEIGHT: 300 LBS

## 2023-09-12 DIAGNOSIS — G47.30 OBSERVED SLEEP APNEA: ICD-10-CM

## 2023-09-12 DIAGNOSIS — R06.83 SNORING: Primary | ICD-10-CM

## 2023-09-12 DIAGNOSIS — E66.812 CLASS 2 OBESITY DUE TO EXCESS CALORIES WITH BODY MASS INDEX (BMI) OF 38.0 TO 38.9 IN ADULT, UNSPECIFIED WHETHER SERIOUS COMORBIDITY PRESENT: ICD-10-CM

## 2023-09-12 DIAGNOSIS — E66.09 CLASS 2 OBESITY DUE TO EXCESS CALORIES WITH BODY MASS INDEX (BMI) OF 38.0 TO 38.9 IN ADULT, UNSPECIFIED WHETHER SERIOUS COMORBIDITY PRESENT: ICD-10-CM

## 2023-09-12 DIAGNOSIS — I10 HYPERTENSION, UNSPECIFIED TYPE: ICD-10-CM

## 2023-09-12 PROCEDURE — 99203 OFFICE O/P NEW LOW 30 MIN: CPT | Mod: VID | Performed by: INTERNAL MEDICINE

## 2023-09-12 ASSESSMENT — PAIN SCALES - GENERAL: PAINLEVEL: NO PAIN (0)

## 2023-09-12 NOTE — PROGRESS NOTES
Virtual Visit Details    Type of service:  Video Visit     Originating Location (pt. Location): Home    Distant Location (provider location):  Off-site  Platform used for Video Visit: [employer]    Chief complaint: Consultation requested by Dr. Fuad Vega for evaluation of possible sleep apnea     History of Present Illness: 57-year-old gentleman with past medical history notable for follicular lymphoma 20 years ago.  He reports concerns about possible sleep apnea.  All three of his brothers have been diagnosed with sleep apnea.  Patient reports that he is woken up on occasion with a sense of gasping.  He talk to his children who said that they have heard him to snore loud as well as have pauses in his breathing at night.  He is also noticed elevated blood pressure measurements.  At first he thought they were just related to being seen in the physician but they have been persisting for a long time.  He does not currently have a primary care provider and is not on blood pressure medication.    He typically will get sleepy around 9 30-10 PM and will go to bed but then will have difficulty initiating sleep.  Sometimes he will take some over-the-counter cough medicine to help him fall asleep.  Once he falls asleep he sleeps through the night for the most part.  He may wake up once to go to the bathroom.  He does wake up with dry mouth in the morning.  He is also been waking up with headaches on occasion.  He gets up around 530 AM on days he needs to drive into work, 6;40 on days where he is working from home.  On nonwork days he may sleep until 7:15-8:30.  He does not typically take naps.  He has difficulty falling asleep during naps.  However he often will rest for a little bit after work.    He typically will have 1-2 caffeinated beverages a day.  He does not drink alcohol.    No history of restless legs, sleepwalking, sleep talking or dream enactment behavior.  No known bruxism.  No cataplexy, hypnagogic  hallucinations, or sleep paralysis.    [place] Sleepiness Scale   Sitting and reading: Slight chance of dozing   Watching TV: Slight chance of dozing   Sitting, inactive in a public place (e.g. a theatre or a meeting): Would never doze   As a passenger in a car for an hour without a break: Would never doze   Lying down to rest in the afternoon when circumstances permit: Slight chance of dozing   Sitting and talking to someone: Would never doze   Sitting quietly after a lunch without alcohol: Slight chance of dozing   In a car, while stopped for a few minutes in traffic: Would never doze   Total score - [place]: 4   (Less than 10 normal)    Insomnia Severity Scale  SHIELA Total Score: 11  Total score categories:  0-7 = No clinically significant insomnia   8-14 = Subthreshold insomnia   15-21 = Clinical insomnia (moderate severity)  22-28 = Clinical insomnia (severe)    STOP-BANG  Loud Snore   1  Excessively Tired/Sleepy   0  Observed apnea   1  Hypertension   1  BMI> 35 kg/m2   1  Age >50   1  Neck >16 in/40cm   1  Male Gender   1  Total =   7  (0-2 low, 3-4 intermediate, 5-8 high risk of CAROLE)      No past medical history on file.    Allergies   Allergen Reactions    Nkda [No Known Drug Allergy]        Current Outpatient Medications   Medication    aspirin (ASA) 81 MG chewable tablet    triamcinolone (KENALOG) 0.1 % external cream     No current facility-administered medications for this visit.       Social History     Socioeconomic History    Marital status:      Spouse name: Not on file    Number of children: Not on file    Years of education: Not on file    Highest education level: Not on file   Occupational History    Not on file   Tobacco Use    Smoking status: Never    Smokeless tobacco: Never   Substance and Sexual Activity    Alcohol use: No    Drug use: No    Sexual activity: Yes     Partners: Female   Other Topics Concern     Service No    Blood Transfusions No    Caffeine Concern No     "Occupational Exposure No    Hobby Hazards No    Sleep Concern No    Stress Concern No    Weight Concern Yes    Special Diet No    Back Care No    Exercise No    Bike Helmet Yes    Seat Belt Yes    Self-Exams Not Asked   Social History Narrative    Not on file     Social Determinants of Health     Financial Resource Strain: Not on file   Food Insecurity: Not on file   Transportation Needs: Not on file   Physical Activity: Not on file   Stress: Not on file   Social Connections: Not on file   Intimate Partner Violence: Not on file   Housing Stability: Not on file       Family History   Problem Relation Age of Onset    Hypertension Father     Heart Disease Father     Cancer Maternal Grandmother     Cancer Maternal Grandfather     Heart Disease Paternal Grandfather          EXAM:  Ht 1.88 m (6' 2\")   Wt 136.1 kg (300 lb)   BMI 38.52 kg/m    GENERAL: Alert and no distress, short beard  EYES: Eyes grossly normal to inspection.  No discharge or erythema, or obvious scleral/conjunctival abnormalities.  RESP: No audible wheeze, cough, or visible cyanosis.  No visible retractions or increased work of breathing.    SKIN: Visible skin clear. No significant rash, abnormal pigmentation or lesions.  NEURO: Cranial nerves grossly intact.  Mentation and speech appropriate for age.  PSYCH: Mentation appears normal, affect normal, judgement and insight intact, normal speech and appearance well-groomed.     TSH   Date Value Ref Range Status   06/20/2023 3.46 0.30 - 4.20 uIU/mL Final   04/26/2022 2.58 0.40 - 4.00 mU/L Final   04/28/2020 3.83 0.40 - 4.00 mU/L Final         ASSESSMENT:  57-year-old gentleman with history of follicular lymphoma, obesity, hypertension, snoring, observed apnea.  Overall he is at high risk for obstructive sleep apnea.  Identifying and treating sleep apnea is medically indicated.    PLAN:  We reviewed the pathophysiology of obstructive sleep apnea, testing options, indications for treatment and treatment " options.  Patient has severe sleep apnea would recommend CPAP.  If mild to moderate consider oral appliance therapy or CPAP.  In the meantime, strongly urged patient to establish care with primary care.  Consider getting blood pressure checked outside of clinic and bring those measurements with him to the primary care provider.  I will be contacting him with results of the home sleep apnea test when they become available.  See instructions for further details of counseling provided.  He is agreeable with this plan.      43  minutes spent by me on the date of the encounter doing chart review, history and exam, documentation and further activities per the note    Dee Dee Jung M.D.  Pulmonary/Critical Care/Sleep Medicine    [facility]   Floor 1, [address]   [address] [place], [place] [address]. [place] [address]   Appointments: [phone]    The above note was dictated using voice recognition software and may include typographical errors. Please contact the author for any clarifications.

## 2023-09-12 NOTE — PATIENT INSTRUCTIONS
"          MY TREATMENT INFORMATION FOR SLEEP APNEA-  Julien Lee    DOCTOR : Dee Dee Jung MD    Am I having a home sleep study?  --->Watch the video for the device you are using:    -/drop off device-   https://www.[employer].com/watch?v=yGGFBdELGhk      Frequently asked questions:  1. What is Obstructive Sleep Apnea (CAROLE)? CAROLE is the most common type of sleep apnea. Apnea means, \"without breath.\"  Apnea is most often caused by narrowing or collapse of the upper airway as muscles relax during sleep.   Almost everyone has occasional apneas. Most people with sleep apnea have had brief interruptions at night frequently for many years.  The severity of sleep apnea is related to how frequent and severe the events are.   2. What are the consequences of CAROLE? Symptoms include: feeling sleepy during the day, snoring loudly, gasping or stopping of breathing, trouble sleeping, and occasionally morning headaches or heartburn at night.  Sleepiness can be serious and even increase the risk of falling asleep while driving. Other health consequences may include development of high blood pressure and other cardiovascular disease in persons who are susceptible. Untreated CAROLE  can contribute to heart disease, stroke and diabetes.   3. What are the treatment options? In most situations, sleep apnea is a lifelong disease that must be managed with daily therapy. Medications are not effective for sleep apnea and surgery is generally not considered until other therapies have been tried. Your treatment is your choice . Continuous Positive Airway (CPAP) works right away and is the therapy that is effective in nearly everyone. An oral device to hold your jaw forward is usually the next most reliable option. Other options include postioning devices (to keep you off your back), weight loss, and surgery including a tongue pacing device. There is more detail about some of these options below.  4. Are my sleep studies covered by " insurance? Although we will request verification of coverage, we advise you also check in advance of the study to ensure there is coverage.    Important tips for those choosing CPAP and similar devices  For new devices, sign up for device JESSIKA to monitor your device for your followup visits  We encourage you to utilize the [employer] jessika or website (myAir web (resmed.com) ) to monitor your therapy progress and share the data with your healthcare team when you discuss your sleep apnea.                                                    Know your equipment:  CPAP is continuous positive airway pressure that prevents obstructive sleep apnea by keeping the throat from collapsing while you are sleeping. In most cases, the device is  smart  and can slowly self-adjusts if your throat collapses and keeps a record every day of how well you are treated-this information is available to you and your care team.  BPAP is bilevel positive airway pressure that keeps your throat open and also assists each breath with a pressure boost to maintain adequate breathing.  Special kinds of BPAP are used in patients who have inadequate breathing from lung or heart disease. In most cases, the device is  smart  and can slowly self-adjusts to assist breathing. Like CPAP, the device keeps a record of how well you are treated.  Your mask is your connection to the device. You get to choose what feels most comfortable and the staff will help to make sure if fits. Here: are some examples of the different masks that are available:       Key points to remember on your journey with sleep apnea:  Sleep study.  PAP devices often need to be adjusted during a sleep study to show that they are effective and adjusted right.  Good tips to remember: Try wearing just the mask during a quiet time during the day so your body adapts to wearing it. A humidifier is recommended for comfort in most cases to prevent drying of your nose and throat. Allergy  medication from your provider may help you if you are having nasal congestion.  Getting settled-in. It takes more than one night for most of us to get used to wearing a mask. Try wearing just the mask during a quiet time during the day so your body adapts to wearing it. A humidifier is recommended for comfort in most cases. Our team will work with you carefully on the first day and will be in contact within 4 days and again at 2 and 4 weeks for advice and remote device adjustments. Your therapy is evaluated by the device each day.   Use it every night. The more you are able to sleep naturally for 7-8 hours, the more likely you will have good sleep and to prevent health risks or symptoms from sleep apnea. Even if you use it 4 hours it helps. Occasionally all of us are unable to use a medical therapy, in sleep apnea, it is not dangerous to miss one night.   Communicate. Call our skilled team on the number provided on the first day if your visit for problems that make it difficult to wear the device. Over 2 out of 3 patients can learn to wear the device long-term with help from our team. Remember to call our team or your sleep providers if you are unable to wear the device as we may have other solutions for those who cannot adapt to mask CPAP therapy. It is recommended that you sleep your sleep provider within the first 3 months and yearly after that if you are not having problems.   Use it for your health. We encourage use of CPAP masks during daytime quiet periods to allow your face and brain to adapt to the sensation of CPAP so that it will be a more natural sensation to awaken to at night or during naps. This can be very useful during the first few weeks or months of adapting to CPAP though it does not help medically to wear CPAP during wakefulness and  should not be used as a strategy just to meet guidelines.  Take care of your equipment. Make sure you clean your mask and tubing using directions every day and that  your filter and mask are replaced as recommended or if they are not working.     BESIDES CPAP, WHAT OTHER THERAPIES ARE THERE?    Positioning Device  Positioning devices are generally used when sleep apnea is mild and only occurs on your back.This example shows a pillow that straps around the waist. It may be appropriate for those whose sleep study shows milder sleep apnea that occurs primarily when lying flat on one's back. Preliminary studies have shown benefit but effectiveness at home may need to be verified by a home sleep test. These devices are generally not covered by medical insurance.  Examples of devices that maintain sleeping on the back to prevent snoring and mild sleep apnea.    Belt type body positioner  http://[employer]/    Electronic reminder  http://nightshifttherapy.com/            Oral Appliance  What is oral appliance therapy?  An oral appliance device fits on your teeth at night like a retainer used after having braces. The device is made by a specialized dentist and requires several visits over 1-2 months before a manufactured device is made to fit your teeth and is adjusted to prevent your sleep apnea. Once an oral device is working properly, snoring should be improved. A home sleep test may be recommended at that time if to determine whether the sleep apnea is adequately treated.       Some things to remember:  -Oral devices are often, but not always, covered by your medical insurance. Be sure to check with your insurance provider.   -If you are referred for oral therapy, you will be given a list of specialized dentists to consider or you may choose to visit the Web site of the American Academy of Dental Sleep Medicine  -Oral devices are less likely to work if you have severe sleep apnea or are extremely overweight.     More detailed information  An oral appliance is a small acrylic device that fits over the upper and lower teeth  (similar to a retainer or a mouth guard). This device  slightly moves jaw forward, which moves the base of the tongue forward, opens the airway, improves breathing for effective treat snoring and obstructive sleep apnea in perhaps 7 out of 10 people .  The best working devices are custom-made by a dental device  after a mold is made of the teeth 1, 2, 3.  When is an oral appliance indicated?  Oral appliance therapy is recommended as a first-line treatment for patients with primary snoring, mild sleep apnea, and for patients with moderate sleep apnea who prefer appliance therapy to use of CPAP4, 5. Severity of sleep apnea is determined by sleep testing and is based on the number of respiratory events per hour of sleep.   How successful is oral appliance therapy?  The success rate of oral appliance therapy in patients with mild sleep apnea is 75-80% while in patients with moderate sleep apnea it is 50-70%. The chance of success in patients with severe sleep apnea is 40-50%. The research also shows that oral appliances have a beneficial effect on the cardiovascular health of CAROLE patients at the same magnitude as CPAP therapy7.  Oral appliances should be a second-line treatment in cases of severe sleep apnea, but if not completely successful then a combination therapy utilizing CPAP plus oral appliance therapy may be effective. Oral appliances tend to be effective in a broad range of patients although studies show that the patients who have the highest success are females, younger patients, those with milder disease, and less severe obesity. 3, 6.   Finding a dentist that practices dental sleep medicine  Specific training is available through the American Academy of Dental Sleep Medicine for dentists interested in working in the field of sleep. To find a dentist who is educated in the field of sleep and the use of oral appliances, near you, visit the Web site of the American Academy of Dental Sleep Medicine.    References  1. malorie Kramer al. Objectively  measured vs self-reported compliance during oral appliance therapy for sleep-disordered breathing. Chest 2013; 144(5): 0369-4623.  2. Mario, et al. Objective measurement of compliance during oral appliance therapy for sleep-disordered breathing. Thorax 2013; 68(1): 91-96.  3. Jovana et al. Mandibular advancement devices in 620 men and women with CAROLE and snoring: tolerability and predictors of treatment success. Chest 2004; 125: 5793-3531.  4. Regino et al. Oral appliances for snoring and CAROLE: a review. Sleep 2006; 29: 244-262.  5. Destini et al. Oral appliance treatment for CAROLE: an update. J Clin Sleep Med 2014; 10(2): 215-227.  6. David et al. Predictors of OSAH treatment outcome. J Dent Res 2007; 86: 1928-6159.      Weight Loss:    Weight loss is a long-term strategy that may improve sleep apnea in some patients.    Weight management is a personal decision and the decision should be based on your interest and the potential benefits.  If you are interested in exploring weight loss strategies, the following discussion covers the impact on weight loss on sleep apnea and the approaches that may be successful.    Being overweight does not necessarily mean you will have health consequences.  Those who have BMI over 35 or over 27 with existing medical conditions carries greater risk.   Weight loss decreases severity of sleep apnea in most people with obesity. For those with mild obesity who have developed snoring with weight gain, even 15-30 pound weight loss can improve and occasionally eliminate sleep apnea.  Structured and life-long dietary and health habits are necessary to lose weight and keep healthier weight levels.     Though there may be significant health benefits from weight loss, long-term weight loss is very difficult to achieve- studies show success with dietary management in less than 10% of people. In addition, substantial weight loss may require years of dietary control and may be  difficult if patients have severe obesity. In these cases, surgical management may be considered.  Finally, older individuals who have tolerated obesity without health complications may be less likely to benefit from weight loss strategies.        Surgery:    Surgery for obstructive sleep apnea is considered generally only when other therapies fail to work. Surgery may be discussed with you if you are having a difficult time tolerating CPAP and or when there is an abnormal structure that requires surgical correction.  Nose and throat surgeries often enlarge the airway to prevent collapse.  Most of these surgeries create pain for 1-2 weeks and up to half of the most common surgeries are not effective throughout life.  You should carefully discuss the benefits and drawbacks to surgery with your sleep provider and surgeon to determine if it is the best solution for you.   More information  Surgery for CAROLE is directed at areas that are responsible for narrowing or complete obstruction of the airway during sleep.  There are a wide range of procedures available to enlarge and/or stabilize the airway to prevent blockage of breathing in the three major areas where it can occur: the palate, tongue, and nasal regions.  Successful surgical treatment depends on the accurate identification of the factors responsible for obstructive sleep apnea in each person.  A personalized approach is required because there is no single treatment that works well for everyone.  Because of anatomic variation, consultation with an examination by a sleep surgeon is a critical first step in determining what surgical options are best for each patient.  In some cases, examination during sedation may be recommended in order to guide the selection of procedures.  Patients will be counseled about risks and benefits as well as the typical recovery course after surgery. Surgery is typically not a cure for a person s CAROLE.  However, surgery will often  significantly improve one s CAROLE severity (termed  success rate ).  Even in the absence of a cure, surgery will decrease the cardiovascular risk associated with OSA7; improve overall quality of life8 (sleepiness, functionality, sleep quality, etc).      Palate Procedures:  Patients with CAROLE often have narrowing of their airway in the region of their tonsils and uvula.  The goals of palate procedures are to widen the airway in this region as well as to help the tissues resist collapse.  Modern palate procedure techniques focus on tissue conservation and soft tissue rearrangement, rather than tissue removal.  Often the uvula is preserved in this procedure. Residual sleep apnea is common in patient after pharyngoplasty with an average reduction in sleep apnea events of 33%2.      Tongue Procedures:  ExamWhile patients are awake, the muscles that surround the throat are active and keep this region open for breathing. These muscles relax during sleep, allowing the tongue and other structures to collapse and block breathing.  There are several different tongue procedures available.  Selection of a tongue base procedure depends on characteristics seen on physical exam.  Generally, procedures are aimed at removing bulky tissues in this area or preventing the back of the tongue from falling back during sleep.  Success rates for tongue surgery range from 50-62%3.    Hypoglossal Nerve Stimulation:  Hypoglossal nerve stimulation has recently received approval from the [place] Food and Drug Administration for the treatment of obstructive sleep apnea.  This is based on research showing that the system was safe and effective in treating sleep apnea6.  Results showed that the median AHI score decreased 68%, from 29.3 to 9.0. This therapy uses an implant system that senses breathing patterns and delivers mild stimulation to airway muscles, which keeps the airway open during sleep.  The system consists of three fully implanted  components: a small generator (similar in size to a pacemaker), a breathing sensor, and a stimulation lead.  Using a small handheld remote, a patient turns the therapy on before bed and off upon awakening.    Candidates for this device must be greater than 18 years of age, have moderate to severe CAROLE (AHI between 15-65), BMI less than 35, have tried CPAP/oral appliance for at least 8 weeks without success, and have appropriate upper airway anatomy (determined by a sleep endoscopy performed by Dr. Reuben Andrade).    Hypoglossal Nerve Stimulation Pathway:    The sleep surgeon s office will work with the patient through the insurance prior-authorization process (including communications and appeals).    Nasal Procedures:  Nasal obstruction can interfere with nasal breathing during the day and night.  Studies have shown that relief of nasal obstruction can improve the ability of some patients to tolerate positive airway pressure therapy for obstructive sleep apnea1.  Treatment options include medications such as nasal saline, topical corticosteroid and antihistamine sprays, and oral medications such as antihistamines or decongestants. Non-surgical treatments can include external nasal dilators for selected patients. If these are not successful by themselves, surgery can improve the nasal airway either alone or in combination with these other options.      Combination Procedures:  Combination of surgical procedures and other treatments may be recommended, particularly if patients have more than one area of narrowing or persistent positional disease.  The success rate of combination surgery ranges from 66-80%2,3.    References  Karma SHARIF. The Role of the Nose in Snoring and Obstructive Sleep Apnoea: An Update.  Eur Arch Otorhinolaryngol. 2011; 268: 1365-73.   Quiana SM; Enrrique JA; Flor JR; Pallanch JF; Marie CARLISLE; Aj BOSCH; Shaheen FRANCO. Surgical modifications of the upper airway for obstructive sleep apnea in adults: a  systematic review and meta-analysis. SLEEP 2010;33(10):2692-6158. Russell LUGO. Hypopharyngeal surgery in obstructive sleep apnea: an evidence-based medicine review.  Arch Otolaryngol Head Neck Surg. 2006 Feb;132(2):206-13.  Harvey YH1, Gera Y, Carlos TONI. The efficacy of anatomically based multilevel surgery for obstructive sleep apnea. Otolaryngol Head Neck Surg. 2003 Oct;129(4):327-35.  Kezirian E, Goldberg A. Hypopharyngeal Surgery in Obstructive Sleep Apnea: An Evidence-Based Medicine Review. Arch Otolaryngol Head Neck Surg. 2006 Feb;132(2):206-13.  Rosio PLASENCIA et al. Upper-Airway Stimulation for Obstructive Sleep Apnea.  N Engl J Med. 2014 Jan 9;370(2):139-49.  Buck Y et al. Increased Incidence of Cardiovascular Disease in Middle-aged Men with Obstructive Sleep Apnea. Am J Respir Crit Care Med; 2002 166: 159-165  Bell EM et al. Studying Life Effects and Effectiveness of Palatopharyngoplasty (SLEEP) study: Subjective Outcomes of Isolated Uvulopalatopharyngoplasty. Otolaryngol Head Neck Surg. 2011; 144: 623-631.        WHAT IF I ONLY HAVE SNORING?    Mandibular advancement devices, lateral sleep positioning, long-term weight loss and treatment of nasal allergies have been shown to improve snoring.  Exercising tongue muscles with a game (https://apps.[employer]ush[employer]/us/jessika/soundly-reduce-snoring/hk7749274316) or stimulating the tongue during the day with a device (https://doi.org/10.3390/edm56536845) have improved snoring in some individuals.    Remember to Drive Safe... Drive Alive     Sleep health profoundly affects your health, mood, and your safety.  Thirty three percent of the population (one in three of us) is not getting enough sleep and many have a sleep disorder. Not getting enough sleep or having an untreated / undertreated sleep condition may make us sleepy without even knowing it. In fact, our driving could be dramatically impaired due to our sleep health. As your provider, here are some things I would like  you to know about driving:     Here are some warning signs for impairment and dangerous drowsy driving:              -Having been awake more than 16 hours               -Looking tired               -Eyelid drooping              -Head nodding (it could be too late at this point)              -Driving for more than 30 minutes     Some things you could do to make the driving safer if you are experiencing some drowsiness:              -Stop driving and rest              -Call for transportation              -Make sure your sleep disorder is adequately treated     Some things that have been shown NOT to work when experiencing drowsiness while driving:              -Turning on the radio              -Opening windows              -Eating any  distracting  /  entertaining  foods (e.g., sunflower seeds, candy, or any other)              -Talking on the phone      Your decision may not only impact your life, but also the life of others. Please, remember to drive safe for yourself and all of us.

## 2023-09-12 NOTE — NURSING NOTE
Is the patient currently in the state of [place]? YES    Visit mode:VIDEO    If the visit is dropped, the patient can be reconnected by: VIDEO VISIT: Text to cell phone:   Telephone Information:   Mobile [phone]       Will anyone else be joining the visit? NO  (If patient encounters technical issues they should call [phone]    How would you like to obtain your AVS? MyChart    Are changes needed to the allergy or medication list? No    Reason for visit: Consult    Nate DAVIS

## 2023-09-12 NOTE — LETTER
9/12/2023         RE: Julien Lee  [address] [facility] [address]        Dear Colleague,    Thank you for referring your patient, Julien Lee, to the [facility]EP [facility] [place]. Please see a copy of my visit note below.    Virtual Visit Details    Type of service:  Video Visit     Originating Location (pt. Location): Home    Distant Location (provider location):  Off-site  Platform used for Video Visit: [facility]    Chief complaint: Consultation requested by Dr. Fuad Vega for evaluation of possible sleep apnea     History of Present Illness: 57-year-old gentleman with past medical history notable for follicular lymphoma 20 years ago.  He reports concerns about possible sleep apnea.  All three of his brothers have been diagnosed with sleep apnea.  Patient reports that he is woken up on occasion with a sense of gasping.  He talk to his children who said that they have heard him to snore loud as well as have pauses in his breathing at night.  He is also noticed elevated blood pressure measurements.  At first he thought they were just related to being seen in the physician but they have been persisting for a long time.  He does not currently have a primary care provider and is not on blood pressure medication.    He typically will get sleepy around 9 30-10 PM and will go to bed but then will have difficulty initiating sleep.  Sometimes he will take some over-the-counter cough medicine to help him fall asleep.  Once he falls asleep he sleeps through the night for the most part.  He may wake up once to go to the bathroom.  He does wake up with dry mouth in the morning.  He is also been waking up with headaches on occasion.  He gets up around 530 AM on days he needs to drive into work, 6;40 on days where he is working from home.  On nonwork days he may sleep until 7:15-8:30.  He does not typically take naps.  He has difficulty falling asleep during naps.  However he often  will rest for a little bit after work.    He typically will have 1-2 caffeinated beverages a day.  He does not drink alcohol.    No history of restless legs, sleepwalking, sleep talking or dream enactment behavior.  No known bruxism.  No cataplexy, hypnagogic hallucinations, or sleep paralysis.    [place] Sleepiness Scale   Sitting and reading: Slight chance of dozing   Watching TV: Slight chance of dozing   Sitting, inactive in a public place (e.g. a theatre or a meeting): Would never doze   As a passenger in a car for an hour without a break: Would never doze   Lying down to rest in the afternoon when circumstances permit: Slight chance of dozing   Sitting and talking to someone: Would never doze   Sitting quietly after a lunch without alcohol: Slight chance of dozing   In a car, while stopped for a few minutes in traffic: Would never doze   Total score - [place]: 4   (Less than 10 normal)    Insomnia Severity Scale  SHIELA Total Score: 11  Total score categories:  0-7 = No clinically significant insomnia   8-14 = Subthreshold insomnia   15-21 = Clinical insomnia (moderate severity)  22-28 = Clinical insomnia (severe)    STOP-BANG  Loud Snore   1  Excessively Tired/Sleepy   0  Observed apnea   1  Hypertension   1  BMI> 35 kg/m2   1  Age >50   1  Neck >16 in/40cm   1  Male Gender   1  Total =   7  (0-2 low, 3-4 intermediate, 5-8 high risk of CAROLE)      No past medical history on file.    Allergies   Allergen Reactions     Nkda [No Known Drug Allergy]        Current Outpatient Medications   Medication     aspirin (ASA) 81 MG chewable tablet     triamcinolone (KENALOG) 0.1 % external cream     No current facility-administered medications for this visit.       Social History     Socioeconomic History     Marital status:      Spouse name: Not on file     Number of children: Not on file     Years of education: Not on file     Highest education level: Not on file   Occupational History     Not on file   Tobacco Use  "    Smoking status: Never     Smokeless tobacco: Never   Substance and Sexual Activity     Alcohol use: No     Drug use: No     Sexual activity: Yes     Partners: Female   Other Topics Concern      Service No     Blood Transfusions No     Caffeine Concern No     Occupational Exposure No     Hobby Hazards No     Sleep Concern No     Stress Concern No     Weight Concern Yes     Special Diet No     Back Care No     Exercise No     Bike Helmet Yes     Seat Belt Yes     Self-Exams Not Asked   Social History Narrative     Not on file     Social Determinants of Health     Financial Resource Strain: Not on file   Food Insecurity: Not on file   Transportation Needs: Not on file   Physical Activity: Not on file   Stress: Not on file   Social Connections: Not on file   Intimate Partner Violence: Not on file   Housing Stability: Not on file       Family History   Problem Relation Age of Onset     Hypertension Father      Heart Disease Father      Cancer Maternal Grandmother      Cancer Maternal Grandfather      Heart Disease Paternal Grandfather          EXAM:  Ht 1.88 m (6' 2\")   Wt 136.1 kg (300 lb)   BMI 38.52 kg/m    GENERAL: Alert and no distress, short beard  EYES: Eyes grossly normal to inspection.  No discharge or erythema, or obvious scleral/conjunctival abnormalities.  RESP: No audible wheeze, cough, or visible cyanosis.  No visible retractions or increased work of breathing.    SKIN: Visible skin clear. No significant rash, abnormal pigmentation or lesions.  NEURO: Cranial nerves grossly intact.  Mentation and speech appropriate for age.  PSYCH: Mentation appears normal, affect normal, judgement and insight intact, normal speech and appearance well-groomed.     TSH   Date Value Ref Range Status   06/20/2023 3.46 0.30 - 4.20 uIU/mL Final   04/26/2022 2.58 0.40 - 4.00 mU/L Final   04/28/2020 3.83 0.40 - 4.00 mU/L Final         ASSESSMENT:  57-year-old gentleman with history of follicular lymphoma, obesity, " hypertension, snoring, observed apnea.  Overall he is at high risk for obstructive sleep apnea.  Identifying and treating sleep apnea is medically indicated.    PLAN:  We reviewed the pathophysiology of obstructive sleep apnea, testing options, indications for treatment and treatment options.  Patient has severe sleep apnea would recommend CPAP.  If mild to moderate consider oral appliance therapy or CPAP.  In the meantime, strongly urged patient to establish care with primary care.  Consider getting blood pressure checked outside of clinic and bring those measurements with him to the primary care provider.  I will be contacting him with results of the home sleep apnea test when they become available.  See instructions for further details of counseling provided.  He is agreeable with this plan.      43  minutes spent by me on the date of the encounter doing chart review, history and exam, documentation and further activities per the note    Dee Dee Jung M.D.  Pulmonary/Critical Care/Sleep Medicine    [facility]   Floor 1, [address]   [address] [place] [address]. [place] [address]   Appointments: [phone]    The above note was dictated using voice recognition software and may include typographical errors. Please contact the author for any clarifications.            Again, thank you for allowing me to participate in the care of your patient.        Sincerely,        Dee Dee Jung MD

## 2023-10-25 ASSESSMENT — SLEEP AND FATIGUE QUESTIONNAIRES
HOW LIKELY ARE YOU TO NOD OFF OR FALL ASLEEP IN A CAR, WHILE STOPPED FOR A FEW MINUTES IN TRAFFIC: WOULD NEVER DOZE
HOW LIKELY ARE YOU TO NOD OFF OR FALL ASLEEP WHILE SITTING AND READING: SLIGHT CHANCE OF DOZING
HOW LIKELY ARE YOU TO NOD OFF OR FALL ASLEEP WHILE SITTING QUIETLY AFTER LUNCH WITHOUT ALCOHOL: SLIGHT CHANCE OF DOZING
HOW LIKELY ARE YOU TO NOD OFF OR FALL ASLEEP WHEN YOU ARE A PASSENGER IN A CAR FOR AN HOUR WITHOUT A BREAK: SLIGHT CHANCE OF DOZING
HOW LIKELY ARE YOU TO NOD OFF OR FALL ASLEEP WHILE SITTING AND TALKING TO SOMEONE: WOULD NEVER DOZE
HOW LIKELY ARE YOU TO NOD OFF OR FALL ASLEEP WHILE SITTING INACTIVE IN A PUBLIC PLACE: WOULD NEVER DOZE
HOW LIKELY ARE YOU TO NOD OFF OR FALL ASLEEP WHILE WATCHING TV: MODERATE CHANCE OF DOZING
HOW LIKELY ARE YOU TO NOD OFF OR FALL ASLEEP WHILE LYING DOWN TO REST IN THE AFTERNOON WHEN CIRCUMSTANCES PERMIT: MODERATE CHANCE OF DOZING

## 2023-10-26 ENCOUNTER — OFFICE VISIT (OUTPATIENT)
Dept: SLEEP MEDICINE | Facility: CLINIC | Age: 58
End: 2023-10-26
Attending: INTERNAL MEDICINE
Payer: COMMERCIAL

## 2023-10-26 DIAGNOSIS — R06.83 SNORING: ICD-10-CM

## 2023-10-26 DIAGNOSIS — G47.33 OSA (OBSTRUCTIVE SLEEP APNEA): Primary | ICD-10-CM

## 2023-10-26 DIAGNOSIS — G47.30 OBSERVED SLEEP APNEA: ICD-10-CM

## 2023-10-26 DIAGNOSIS — I10 HYPERTENSION, UNSPECIFIED TYPE: ICD-10-CM

## 2023-10-26 DIAGNOSIS — E66.09 CLASS 2 OBESITY DUE TO EXCESS CALORIES WITH BODY MASS INDEX (BMI) OF 38.0 TO 38.9 IN ADULT, UNSPECIFIED WHETHER SERIOUS COMORBIDITY PRESENT: ICD-10-CM

## 2023-10-26 DIAGNOSIS — E66.812 CLASS 2 OBESITY DUE TO EXCESS CALORIES WITH BODY MASS INDEX (BMI) OF 38.0 TO 38.9 IN ADULT, UNSPECIFIED WHETHER SERIOUS COMORBIDITY PRESENT: ICD-10-CM

## 2023-10-26 PROCEDURE — G0399 HOME SLEEP TEST/TYPE 3 PORTA: HCPCS | Performed by: INTERNAL MEDICINE

## 2023-10-26 NOTE — PROGRESS NOTES
Pt is completing a home sleep test. Pt was instructed on how to put on the Noxturnal T3 device and associated equipment before going to bed and given the opportunity to practice putting it on before leaving the sleep center. Pt was reminded to bring the home sleep test kit back to the center tomorrow, at agreed upon time for download and reporting.   Neck circumference: 48 CM / 19 inches.  Kira Stephen CMA on 10/26/2023 at 10:34 AM

## 2023-10-27 ENCOUNTER — DOCUMENTATION ONLY (OUTPATIENT)
Dept: SLEEP MEDICINE | Facility: CLINIC | Age: 58
End: 2023-10-27
Attending: INTERNAL MEDICINE
Payer: COMMERCIAL

## 2023-10-30 NOTE — NURSING NOTE
Pt returned HST device. It was downloaded and forwarded data to the clinical specialist for scoring.  Kira Stephen CMA on 10/30/2023 at 8:22 AM

## 2023-10-30 NOTE — PROGRESS NOTES
HST POST-STUDY QUESTIONNAIRE    What time did you go to bed?  9:30  How long do you think it took to fall asleep?  1 hour  What time did you wake up to start the day?  5:30  Did you get up during the night at all?  Yes  If you woke up, do you remember approximately what time(s)? 3:00  Did you have any difficulty with the equipment?  No  Did you us any type of treatment with this study?  None  Was the head of the bed elevated? No  Did you sleep in a recliner?  No  Did you stop using CPAP at least 3 days before this test?  NA  Any other information you'd like us to know? Was not the most restful sleep I have had.

## 2023-10-31 PROBLEM — G47.33 OSA (OBSTRUCTIVE SLEEP APNEA): Status: ACTIVE | Noted: 2023-10-31

## 2023-10-31 NOTE — PROGRESS NOTES
This HSAT was performed using a Noxturnal T3 device which recorded snore, sound, movement activity, body position, nasal pressure, oronasal thermal airflow, pulse, oximetry and both chest and abdominal respiratory effort. HSAT data was restricted to the time patient states they were in bed.     HSAT was scored using 1B 4% hypopnea rule.     HST AHI (Non-PAT): 39.3  Snoring was reported as mild and moderate.  Time with SpO2 below 89% was 29.2 minutes.   Overall signal quality was good.     Pt will follow up with sleep provider to determine appropriate therapy.

## 2023-10-31 NOTE — PROCEDURES
"HOME SLEEP STUDY INTERPRETATION        Patient: Julien Lee  MRN: [id number]  YOB: 1965  Study Date: 10/26/2023  PCP/Referring Provider: Chuck Cabrera MD  Ordering Provider:   Dee Dee Jung MD         Indications for Home Study: Julien Lee is a 57 year old male with a history of follicular lymphoma who presents with symptoms suggestive of obstructive sleep apnea.    Estimated body mass index is 38.52 kg/m  as calculated from the following:    Height as of 9/12/23: 1.88 m (6' 2\").    Weight as of 9/12/23: 136.1 kg (300 lb).  Total score - [place]: 7 (10/25/2023  1:37 PM)  StopBang Total Score: 7 (9/12/2023  7:32 AM)Total Score: 8 (10/25/2023  1:38 PM)        Data: A full night home sleep study was performed recording the standard physiologic parameters including body position, movement, sound, nasal pressure, thermal oral airflow, chest and abdominal movements with respiratory inductance plethysmography, and oxygen saturation by pulse oximetry. Pulse rate was estimated by oximetry recording. This study was considered adequate based on > 4 hours of quality oximetry and respiratory recording. As specified by the AASM Manual for the Scoring of Sleep and Associated events, version 2.3, Rule VIII.D 1B, 4% oxygen desaturation scoring for hypopneas is used as a standard of care on all home sleep apnea testing.        Analysis Time:  404 minutes        Respiration:   Sleep Associated Hypoxemia: sustained hypoxemia was present. Baseline oxygen saturation was 97%.  Time with saturation less than or equal to 88% was 29 minutes. The lowest oxygen saturation was 81%.   Snoring: Snoring was present (27% at average 68 dB.)  Respiratory events: The home study revealed a presence of 201 obstructive apneas and 21 mixed and central apneas. There were 41 hypopneas resulting in a combined apnea/hypopnea index [AHI] of 39.3 events per hour.  AHI was 97.3 per hour supine, NA per hour prone, 18.5 per hour on " left side, and 4 per hour on right side.   Pattern: Excluding events noted above, respiratory rate and pattern was Normal.      Position: Percent of time spent: supine - 35%, prone - 0%, on left - 16%, on right - 48%.      Heart Rate: By pulse oximetry tachycardia was noted.       Assessment:   Severe obstructive sleep apnea with AHI 39.3 events per hour.  Sleep associated hypoxemia was present.    Recommendations:  Consider auto-CPAP at 5-15 cmH2O or polysomnography with full night PAP titration.  Suggest optimizing sleep hygiene and avoiding sleep deprivation.  Weight management.        Diagnosis Code(s): Obstructive Sleep Apnea G47.33, Hypoxemia G47.36, Snoring R06.83    Electronically signed by: Dee Dee Jung MD, October 31, 2023   Diplomate, American Board of Internal Medicine, Sleep Medicine

## 2023-11-07 ENCOUNTER — DOCUMENTATION ONLY (OUTPATIENT)
Dept: SLEEP MEDICINE | Facility: CLINIC | Age: 58
End: 2023-11-07
Payer: COMMERCIAL

## 2023-11-07 DIAGNOSIS — G47.33 OBSTRUCTIVE SLEEP APNEA (ADULT) (PEDIATRIC): Primary | ICD-10-CM

## 2023-11-07 NOTE — PROGRESS NOTES
Patient was offered choice of vendor and chose [facility].  Patient Julien Lee was set up at [place] on November 7, 2023. Patient received a Resmed Airsense 10 Pressures were set at  5-15 cm H2O.   Patient s ramp is 5 cm H2O for Auto and FLEX/EPR is EPR, 3.  Patient received a Resmed Mask name: AirTouch F20 Full Face mask size Medium, heated tubing and heated humidifier.  Patient has the following compliance requirements: none.   FREDRICK SAUCEDA

## 2023-11-10 ENCOUNTER — DOCUMENTATION ONLY (OUTPATIENT)
Dept: SLEEP MEDICINE | Facility: CLINIC | Age: 58
End: 2023-11-10
Payer: COMMERCIAL

## 2023-11-10 NOTE — PROGRESS NOTES
3 day Sleep therapy management telephone visit    Diagnostic AHI:  39.3 HST    Confirmed with patient at time of call- N/A Patient is still interested in STM service       Message left for patient to return call.        Objective data     Order Settings for PAP  CPAP min     CPAP max              Device settings from machine CPAP min 5.0     CPAP max 15.0           EPR Setting THREE    RESMED soft response  ON     Assessment: Nightly usage over four hours      Action plan: Patient to have 14 day STM visit. Patient has a follow up visit scheduled:   no    Replacement device: No  STM ordered by provider: Yes     Total time spent on accessing and  interpreting remote patient PAP therapy data  10 minutes    Total time spent counseling, coaching  and reviewing PAP therapy data with patient  1 minutes    [id number] no

## 2024-03-10 ENCOUNTER — MYC MEDICAL ADVICE (OUTPATIENT)
Dept: TRANSPLANT | Facility: CLINIC | Age: 59
End: 2024-03-10
Payer: COMMERCIAL

## 2024-03-12 NOTE — TELEPHONE ENCOUNTER
Per conversation with Lucinda Joe RN, Radiation Oncology, she will need to check with dosimetrist on how to handle this request.  She was agreeable to author forwarding this MyChart to her.  Irina Young RN, OCN, RN Care Coordinator, [facility]

## 2024-03-19 ENCOUNTER — TRANSFERRED RECORDS (OUTPATIENT)
Dept: RADIATION ONCOLOGY | Facility: CLINIC | Age: 59
End: 2024-03-19
Payer: COMMERCIAL

## 2024-06-08 ENCOUNTER — HEALTH MAINTENANCE LETTER (OUTPATIENT)
Age: 59
End: 2024-06-08

## 2024-06-28 DIAGNOSIS — G47.33 OSA (OBSTRUCTIVE SLEEP APNEA): ICD-10-CM

## 2024-06-28 DIAGNOSIS — C82.80 OTHER TYPE OF FOLLICULAR LYMPHOMA, UNSPECIFIED BODY REGION (H): Primary | ICD-10-CM

## 2024-06-28 NOTE — PROGRESS NOTES
[facility] Onc Clinic Progress Note   2024    ONCOLOGY SUMMARY:  Julien Lee is a 58-year-old with a history of follicular lymphoma diagnosed originally in 2002. He was considered stage DERIK with grade 1/3 follicular lymphoma.  Disease was noted in the lymph nodes within the abdomen and inguinal regions, plus interstitial involvement in the bone marrow.  Over the years, he has had disease at other sites, including above the diaphragm, neck, axillae, etc.  He has received multiple treatments including oral chlorambucil (- ), Rituximab (), chlorambucil again in  for a pleural effusion, R-CVP x 6 cycles from 2009-2010, radiation (2660 cGy) to the right neck in -2010 for a persistent local problem, and most recently 6 cycles of rituximab plus bendamustine from 10/2014 - 2015.  He has not required additional therapy over the past 7 years.     Mr. Lee's wife  in 2011 of a brain tumor.  He has subsequently raised their 3 children as a single parent.     HISTORY OF PRESENT ILLNESS:  Mr. Lee has been feeling well. He continues to work from home 2 days per week and in office 3 days.. He has not had any fevers or chills, abdominal pain, or early satiety. He has not noticed any swollen lymph nodes.but wonders about a node in left neck.  No new infections. He now uses CPAP.Weight continue to be an issue but has lost 15 lb. Has been getting dental implants related to teet being removed due to radiation to neck Started losartan 50mg for BP  REVIEW OF SYSTEMS:  A 10-point review otherwise negative.      MEDICATIONS:  Aspirin 81mg daily     ALLERGIES:  None reported.      ROS: 12 pt ROS neg except as in HPI  PMH FH and SH: see note from 2022    PHYSICAL EXAMINATION:    /86   Pulse 109   Temp 98  F (36.7  C) (Oral)   Resp 18   Wt 132.2 kg (291 lb 6.4 oz)   SpO2 98%   BMI 37.41 kg/m       GENERAL: Obese alert, in no apparent distress; no palpable cervical,  axillary, inguinal lymphadenopathy  HEENT:  no scleral icterus, moist mucous membranes  edentulaous has false teeth  CARDIAC: regular rate and rhythm  RESPIRATORY: clear to auscultation bilaterally   ABDOMEN: Obese BS ok Umbilical hernia no hepatosplenomegaly  SKIN: no bruising or rash on visible skin Lipoma 2cm on right scapula patch quarter size psoriasis right wrist  EXTREMITIES: dry skin No pitting edema.  NEUROLOGIC: Cranial nerves 2-12 grossly intact.        ASSESSMENT AND PLAN:      1. Follicular lymphoma    2. Hypertension  3. Obesity  4. Obstructive Sleep apnea.   5. Lipoma on back  6. Psoriasis right wrist  Patient is now ~22 years following diagnosis of follicular lymphoma and is in CR. He has not received therapy since 03/2015. CT scans Oct 2019 show no significant changes. No nodes no B sx today  Glad he saw Dr Jung and has his CAROLE treated with CPAP  am concerned about his obesity Will continue diet and exercise Has better BP control     I will see him in 12 months  -.    I spent a total of 30 minutes face to face with Julien Lee during today's office visit. Over 50% of this time was spent counseling the patient and coordinating the care regarding lymphoma  and 10 minutes preparing to see the patient and  care coordination   The longitudinal plan of care for the diagnosis(es)/condition(s) as documented were addressed during this visit. Due to the added complexity in care, I will continue to support Isaías in the subsequent management and with ongoing continuity of care.     Fuad Vega MD  [phone]

## 2024-07-02 ENCOUNTER — ONCOLOGY VISIT (OUTPATIENT)
Dept: TRANSPLANT | Facility: CLINIC | Age: 59
End: 2024-07-02
Attending: INTERNAL MEDICINE
Payer: COMMERCIAL

## 2024-07-02 ENCOUNTER — PATIENT OUTREACH (OUTPATIENT)
Dept: ONCOLOGY | Facility: CLINIC | Age: 59
End: 2024-07-02

## 2024-07-02 ENCOUNTER — LAB (OUTPATIENT)
Dept: LAB | Facility: CLINIC | Age: 59
End: 2024-07-02
Attending: INTERNAL MEDICINE
Payer: COMMERCIAL

## 2024-07-02 VITALS
HEART RATE: 109 BPM | SYSTOLIC BLOOD PRESSURE: 136 MMHG | DIASTOLIC BLOOD PRESSURE: 86 MMHG | TEMPERATURE: 98 F | RESPIRATION RATE: 18 BRPM | OXYGEN SATURATION: 98 % | BODY MASS INDEX: 37.41 KG/M2 | WEIGHT: 291.4 LBS

## 2024-07-02 DIAGNOSIS — C82.80 OTHER TYPE OF FOLLICULAR LYMPHOMA, UNSPECIFIED BODY REGION (H): Primary | ICD-10-CM

## 2024-07-02 DIAGNOSIS — G47.33 OSA (OBSTRUCTIVE SLEEP APNEA): ICD-10-CM

## 2024-07-02 DIAGNOSIS — C82.80 OTHER TYPE OF FOLLICULAR LYMPHOMA, UNSPECIFIED BODY REGION (H): ICD-10-CM

## 2024-07-02 LAB
ALBUMIN SERPL BCG-MCNC: 4.4 G/DL (ref 3.5–5.2)
ALP SERPL-CCNC: 93 U/L (ref 40–150)
ALT SERPL W P-5'-P-CCNC: 38 U/L (ref 0–70)
ANION GAP SERPL CALCULATED.3IONS-SCNC: 12 MMOL/L (ref 7–15)
AST SERPL W P-5'-P-CCNC: 25 U/L (ref 0–45)
BASOPHILS # BLD AUTO: 0.1 10E3/UL (ref 0–0.2)
BASOPHILS NFR BLD AUTO: 1 %
BILIRUB SERPL-MCNC: 0.5 MG/DL
BUN SERPL-MCNC: 12 MG/DL (ref 8–23)
CALCIUM SERPL-MCNC: 9.5 MG/DL (ref 8.6–10)
CHLORIDE SERPL-SCNC: 104 MMOL/L (ref 98–107)
CREAT SERPL-MCNC: 1.01 MG/DL (ref 0.67–1.17)
CRP SERPL-MCNC: 4.23 MG/L
DEPRECATED HCO3 PLAS-SCNC: 22 MMOL/L (ref 22–29)
EGFRCR SERPLBLD CKD-EPI 2021: 86 ML/MIN/1.73M2
EOSINOPHIL # BLD AUTO: 0.2 10E3/UL (ref 0–0.7)
EOSINOPHIL NFR BLD AUTO: 3 %
ERYTHROCYTE [DISTWIDTH] IN BLOOD BY AUTOMATED COUNT: 12 % (ref 10–15)
ERYTHROCYTE [SEDIMENTATION RATE] IN BLOOD BY WESTERGREN METHOD: 2 MM/HR (ref 0–20)
GLUCOSE SERPL-MCNC: 109 MG/DL (ref 70–99)
HCT VFR BLD AUTO: 43.7 % (ref 40–53)
HGB BLD-MCNC: 15.7 G/DL (ref 13.3–17.7)
IMM GRANULOCYTES # BLD: 0.1 10E3/UL
IMM GRANULOCYTES NFR BLD: 1 %
LDH SERPL L TO P-CCNC: 212 U/L (ref 0–250)
LYMPHOCYTES # BLD AUTO: 2.2 10E3/UL (ref 0.8–5.3)
LYMPHOCYTES NFR BLD AUTO: 34 %
MCH RBC QN AUTO: 31 PG (ref 26.5–33)
MCHC RBC AUTO-ENTMCNC: 35.9 G/DL (ref 31.5–36.5)
MCV RBC AUTO: 86 FL (ref 78–100)
MONOCYTES # BLD AUTO: 0.8 10E3/UL (ref 0–1.3)
MONOCYTES NFR BLD AUTO: 12 %
NEUTROPHILS # BLD AUTO: 3.2 10E3/UL (ref 1.6–8.3)
NEUTROPHILS NFR BLD AUTO: 49 %
NRBC # BLD AUTO: 0 10E3/UL
NRBC BLD AUTO-RTO: 0 /100
PLATELET # BLD AUTO: 199 10E3/UL (ref 150–450)
POTASSIUM SERPL-SCNC: 4.1 MMOL/L (ref 3.4–5.3)
PROT SERPL-MCNC: 6.8 G/DL (ref 6.4–8.3)
RBC # BLD AUTO: 5.07 10E6/UL (ref 4.4–5.9)
SODIUM SERPL-SCNC: 138 MMOL/L (ref 135–145)
WBC # BLD AUTO: 6.5 10E3/UL (ref 4–11)

## 2024-07-02 PROCEDURE — 84075 ASSAY ALKALINE PHOSPHATASE: CPT

## 2024-07-02 PROCEDURE — 99214 OFFICE O/P EST MOD 30 MIN: CPT | Performed by: INTERNAL MEDICINE

## 2024-07-02 PROCEDURE — 86140 C-REACTIVE PROTEIN: CPT

## 2024-07-02 PROCEDURE — 85004 AUTOMATED DIFF WBC COUNT: CPT

## 2024-07-02 PROCEDURE — 83615 LACTATE (LD) (LDH) ENZYME: CPT

## 2024-07-02 PROCEDURE — 85652 RBC SED RATE AUTOMATED: CPT

## 2024-07-02 PROCEDURE — 36415 COLL VENOUS BLD VENIPUNCTURE: CPT

## 2024-07-02 PROCEDURE — 99213 OFFICE O/P EST LOW 20 MIN: CPT | Performed by: INTERNAL MEDICINE

## 2024-07-02 RX ORDER — IBUPROFEN 800 MG/1
800 TABLET, FILM COATED ORAL EVERY 6 HOURS PRN
COMMUNITY
Start: 2024-04-12

## 2024-07-02 RX ORDER — LOSARTAN POTASSIUM 50 MG/1
50 TABLET ORAL DAILY
COMMUNITY

## 2024-07-02 RX ORDER — AMOXICILLIN 500 MG/1
TABLET, FILM COATED ORAL
COMMUNITY
Start: 2024-04-12

## 2024-07-02 ASSESSMENT — PAIN SCALES - GENERAL: PAINLEVEL: NO PAIN (0)

## 2024-07-02 NOTE — NURSING NOTE
"Oncology Rooming Note    July 2, 2024 8:24 AM   Julien Lee is a 58 year old male who presents for:    Chief Complaint   Patient presents with    Blood Draw     Labs obtained via venipuncture 23 gauge butterfly needle, VS taken, checked into next appt    Oncology Clinic Visit     Follicular Lymphoma     Initial Vitals: /86   Pulse 109   Temp 98  F (36.7  C) (Oral)   Resp 18   Wt 132.2 kg (291 lb 6.4 oz)   SpO2 98%   BMI 37.41 kg/m   Estimated body mass index is 37.41 kg/m  as calculated from the following:    Height as of 9/12/23: 1.88 m (6' 2\").    Weight as of this encounter: 132.2 kg (291 lb 6.4 oz). Body surface area is 2.63 meters squared.  No Pain (0) Comment: Data Unavailable   No LMP for male patient.  Allergies reviewed: Yes  Medications reviewed: Yes    Medications: Medication refills not needed today.  Pharmacy name entered into [facility]:    [id number] [place], [place] - [address] [place] [address] PHARMACY [place], [place] - [address] [facility] [phone]    Frailty Screening:   Is the patient here for a new oncology consult visit in cancer care? 2. No      Clinical concerns: None       Lovely Cheng LPN  7/2/2024              "

## 2024-07-02 NOTE — PROGRESS NOTES
[facility]: Cancer Care                                                                                        Met with ptIsaías, prior to his visit with Dr. Aric Vega.    Completed learning assessment with pt.  Reviewed consent to communicate (scanned in on 6/28/23) with pt.  Pt stated information was correct and no changes needed at this time.     Pt reported that he has been feeling good.  Denied any current lumps, bumps, itching, fever or night sweats.  Occasionally feels a lump in L base of neck, subclavian area but not feeling it today.    Signature:  Irina Young, FARIDEH, OCN

## 2024-07-02 NOTE — LETTER
2024      Julien Lee  [address] [facility] [address]      Dear Colleague,    Thank you for referring your patient, Julien Lee, to the [facility] BLOOD AND MARROW TRANSPLANT PROGRAM [place]. Please see a copy of my visit note below.    [facility] Progress Note   2024    ONCOLOGY SUMMARY:  Julien Lee is a 58-year-old with a history of follicular lymphoma diagnosed originally in 2002. He was considered stage DERIK with grade 1/3 follicular lymphoma.  Disease was noted in the lymph nodes within the abdomen and inguinal regions, plus interstitial involvement in the bone marrow.  Over the years, he has had disease at other sites, including above the diaphragm, neck, axillae, etc.  He has received multiple treatments including oral chlorambucil (- ), Rituximab (), chlorambucil again in  for a pleural effusion, R-CVP x 6 cycles from 2009-2010, radiation (2660 cGy) to the right neck in -2010 for a persistent local problem, and most recently 6 cycles of rituximab plus bendamustine from 10/2014 - 2015.  He has not required additional therapy over the past 7 years.     Mr. Lee's wife  in 2011 of a brain tumor.  He has subsequently raised their 3 children as a single parent.     HISTORY OF PRESENT ILLNESS:  Mr. Lee has been feeling well. He continues to work from home 2 days per week and in office 3 days.. He has not had any fevers or chills, abdominal pain, or early satiety. He has not noticed any swollen lymph nodes.but wonders about a node in left neck.  No new infections. He now uses CPAP.Weight continue to be an issue but has lost 15 lb. Has been getting dental implants related to teet being removed due to radiation to neck Started losartan 50mg for BP  REVIEW OF SYSTEMS:  A 10-point review otherwise negative.      MEDICATIONS:  Aspirin 81mg daily     ALLERGIES:  None reported.      ROS: 12 pt ROS neg except as in  HPI  PMH FH and SH: see note from April 2022    PHYSICAL EXAMINATION:    /86   Pulse 109   Temp 98  F (36.7  C) (Oral)   Resp 18   Wt 132.2 kg (291 lb 6.4 oz)   SpO2 98%   BMI 37.41 kg/m       GENERAL: Obese alert, in no apparent distress; no palpable cervical, axillary, inguinal lymphadenopathy  HEENT:  no scleral icterus, moist mucous membranes  edentulaous has false teeth  CARDIAC: regular rate and rhythm  RESPIRATORY: clear to auscultation bilaterally   ABDOMEN: Obese BS ok Umbilical hernia no hepatosplenomegaly  SKIN: no bruising or rash on visible skin Lipoma 2cm on right scapula patch quarter size psoriasis right wrist  EXTREMITIES: dry skin No pitting edema.  NEUROLOGIC: Cranial nerves 2-12 grossly intact.        ASSESSMENT AND PLAN:      1. Follicular lymphoma    2. Hypertension  3. Obesity  4. Obstructive Sleep apnea.   5. Lipoma on back  6. Psoriasis right wrist  Patient is now ~22 years following diagnosis of follicular lymphoma and is in CR. He has not received therapy since 03/2015. CT scans Oct 2019 show no significant changes. No nodes no B sx today  Glad he saw Dr Jung and has his CAROLE treated with CPAP  am concerned about his obesity Will continue diet and exercise Has better BP control     I will see him in 12 months  -.    I spent a total of 30 minutes face to face with Julien Lee during today's office visit. Over 50% of this time was spent counseling the patient and coordinating the care regarding lymphoma  and 10 minutes preparing to see the patient and  care coordination   The longitudinal plan of care for the diagnosis(es)/condition(s) as documented were addressed during this visit. Due to the added complexity in care, I will continue to support Isaías in the subsequent management and with ongoing continuity of care.     Fuad Vega MD  [phone]

## 2025-03-10 ENCOUNTER — PATIENT OUTREACH (OUTPATIENT)
Dept: ONCOLOGY | Facility: CLINIC | Age: 60
End: 2025-03-10
Payer: COMMERCIAL

## 2025-03-10 DIAGNOSIS — C82.80 OTHER TYPE OF FOLLICULAR LYMPHOMA, UNSPECIFIED BODY REGION (H): Primary | ICD-10-CM

## 2025-03-10 NOTE — PROGRESS NOTES
[facility]: Cancer Care                                                                                        Per chart review, no labs ordered per visit with Dr. Aric Vega on Tuesday, 3/11/25.  Per communication with Dr. Aric Vega, RN entered telephone orders for CBC/d/p, CMP, CRP, uric acid, SPEP, ESR, phos and LDH for pt to have drawn prior to return appt tomorrow, 3/11.  High priority message sent to clinic coordinators (also asked Ijeoma Walker to call pt).    Signature:  Irina Young RN, OCN

## 2025-03-11 ENCOUNTER — ONCOLOGY VISIT (OUTPATIENT)
Dept: TRANSPLANT | Facility: CLINIC | Age: 60
End: 2025-03-11
Attending: INTERNAL MEDICINE
Payer: COMMERCIAL

## 2025-03-11 ENCOUNTER — PATIENT OUTREACH (OUTPATIENT)
Dept: ONCOLOGY | Facility: CLINIC | Age: 60
End: 2025-03-11

## 2025-03-11 ENCOUNTER — LAB (OUTPATIENT)
Dept: LAB | Facility: CLINIC | Age: 60
End: 2025-03-11
Attending: INTERNAL MEDICINE
Payer: COMMERCIAL

## 2025-03-11 VITALS
OXYGEN SATURATION: 95 % | HEART RATE: 112 BPM | DIASTOLIC BLOOD PRESSURE: 79 MMHG | TEMPERATURE: 98 F | SYSTOLIC BLOOD PRESSURE: 125 MMHG | WEIGHT: 308 LBS | BODY MASS INDEX: 40.82 KG/M2 | HEIGHT: 73 IN | RESPIRATION RATE: 18 BRPM

## 2025-03-11 DIAGNOSIS — G47.33 OSA (OBSTRUCTIVE SLEEP APNEA): ICD-10-CM

## 2025-03-11 DIAGNOSIS — C82.80 OTHER TYPE OF FOLLICULAR LYMPHOMA, UNSPECIFIED BODY REGION (H): ICD-10-CM

## 2025-03-11 DIAGNOSIS — C82.80 OTHER TYPE OF FOLLICULAR LYMPHOMA, UNSPECIFIED BODY REGION (H): Primary | ICD-10-CM

## 2025-03-11 LAB
ALBUMIN SERPL BCG-MCNC: 4.4 G/DL (ref 3.5–5.2)
ALP SERPL-CCNC: 83 U/L (ref 40–150)
ALT SERPL W P-5'-P-CCNC: 64 U/L (ref 0–70)
ANION GAP SERPL CALCULATED.3IONS-SCNC: 10 MMOL/L (ref 7–15)
AST SERPL W P-5'-P-CCNC: 34 U/L (ref 0–45)
BASOPHILS # BLD AUTO: 0.1 10E3/UL (ref 0–0.2)
BASOPHILS NFR BLD AUTO: 1 %
BILIRUB SERPL-MCNC: 0.4 MG/DL
BUN SERPL-MCNC: 14.3 MG/DL (ref 8–23)
CALCIUM SERPL-MCNC: 9.8 MG/DL (ref 8.8–10.4)
CHLORIDE SERPL-SCNC: 106 MMOL/L (ref 98–107)
CREAT SERPL-MCNC: 1.05 MG/DL (ref 0.67–1.17)
CRP SERPL-MCNC: 3.03 MG/L
EGFRCR SERPLBLD CKD-EPI 2021: 81 ML/MIN/1.73M2
EOSINOPHIL # BLD AUTO: 0.1 10E3/UL (ref 0–0.7)
EOSINOPHIL NFR BLD AUTO: 2 %
ERYTHROCYTE [DISTWIDTH] IN BLOOD BY AUTOMATED COUNT: 12.3 % (ref 10–15)
ERYTHROCYTE [SEDIMENTATION RATE] IN BLOOD BY WESTERGREN METHOD: 11 MM/HR (ref 0–20)
GLUCOSE SERPL-MCNC: 109 MG/DL (ref 70–99)
HCO3 SERPL-SCNC: 23 MMOL/L (ref 22–29)
HCT VFR BLD AUTO: 43.7 % (ref 40–53)
HGB BLD-MCNC: 15.8 G/DL (ref 13.3–17.7)
IMM GRANULOCYTES # BLD: 0.1 10E3/UL
IMM GRANULOCYTES NFR BLD: 1 %
LDH SERPL L TO P-CCNC: 218 U/L (ref 0–250)
LYMPHOCYTES # BLD AUTO: 1.8 10E3/UL (ref 0.8–5.3)
LYMPHOCYTES NFR BLD AUTO: 26 %
MCH RBC QN AUTO: 31.5 PG (ref 26.5–33)
MCHC RBC AUTO-ENTMCNC: 36.2 G/DL (ref 31.5–36.5)
MCV RBC AUTO: 87 FL (ref 78–100)
MONOCYTES # BLD AUTO: 0.7 10E3/UL (ref 0–1.3)
MONOCYTES NFR BLD AUTO: 10 %
NEUTROPHILS # BLD AUTO: 4.2 10E3/UL (ref 1.6–8.3)
NEUTROPHILS NFR BLD AUTO: 60 %
NRBC # BLD AUTO: 0 10E3/UL
NRBC BLD AUTO-RTO: 0 /100
PHOSPHATE SERPL-MCNC: 1.8 MG/DL (ref 2.5–4.5)
PLATELET # BLD AUTO: 223 10E3/UL (ref 150–450)
POTASSIUM SERPL-SCNC: 4.2 MMOL/L (ref 3.4–5.3)
PROT SERPL-MCNC: 7 G/DL (ref 6.4–8.3)
RBC # BLD AUTO: 5.01 10E6/UL (ref 4.4–5.9)
SODIUM SERPL-SCNC: 139 MMOL/L (ref 135–145)
TOTAL PROTEIN SERUM FOR ELP: 6.8 G/DL (ref 6.4–8.3)
URATE SERPL-MCNC: 7.1 MG/DL (ref 3.4–7)
WBC # BLD AUTO: 6.9 10E3/UL (ref 4–11)

## 2025-03-11 PROCEDURE — 80053 COMPREHEN METABOLIC PANEL: CPT | Performed by: PATHOLOGY

## 2025-03-11 PROCEDURE — 99214 OFFICE O/P EST MOD 30 MIN: CPT | Performed by: INTERNAL MEDICINE

## 2025-03-11 PROCEDURE — 85025 COMPLETE CBC W/AUTO DIFF WBC: CPT | Performed by: PATHOLOGY

## 2025-03-11 PROCEDURE — 99213 OFFICE O/P EST LOW 20 MIN: CPT | Performed by: INTERNAL MEDICINE

## 2025-03-11 PROCEDURE — 83615 LACTATE (LD) (LDH) ENZYME: CPT | Performed by: PATHOLOGY

## 2025-03-11 PROCEDURE — 86140 C-REACTIVE PROTEIN: CPT | Performed by: PATHOLOGY

## 2025-03-11 PROCEDURE — G2211 COMPLEX E/M VISIT ADD ON: HCPCS | Performed by: INTERNAL MEDICINE

## 2025-03-11 PROCEDURE — 36415 COLL VENOUS BLD VENIPUNCTURE: CPT | Performed by: PATHOLOGY

## 2025-03-11 PROCEDURE — 84550 ASSAY OF BLOOD/URIC ACID: CPT | Performed by: PATHOLOGY

## 2025-03-11 PROCEDURE — 84165 PROTEIN E-PHORESIS SERUM: CPT | Mod: TC | Performed by: PATHOLOGY

## 2025-03-11 PROCEDURE — 84100 ASSAY OF PHOSPHORUS: CPT | Performed by: PATHOLOGY

## 2025-03-11 PROCEDURE — 84165 PROTEIN E-PHORESIS SERUM: CPT | Mod: 26 | Performed by: PATHOLOGY

## 2025-03-11 PROCEDURE — 85652 RBC SED RATE AUTOMATED: CPT | Performed by: PATHOLOGY

## 2025-03-11 RX ORDER — TACROLIMUS 1 MG/G
OINTMENT TOPICAL
COMMUNITY
Start: 2024-11-14

## 2025-03-11 RX ORDER — CHOLECALCIFEROL (VITAMIN D3) 1250 MCG
CAPSULE ORAL SEE ADMIN INSTRUCTIONS
COMMUNITY
Start: 2025-03-05

## 2025-03-11 RX ORDER — CLOBETASOL PROPIONATE 0.5 MG/G
CREAM TOPICAL
COMMUNITY
Start: 2024-11-14

## 2025-03-11 ASSESSMENT — PAIN SCALES - GENERAL: PAINLEVEL_OUTOF10: NO PAIN (0)

## 2025-03-11 NOTE — PROGRESS NOTES
"[facility] ONC CLINIC      ONCOLOGY SUMMARY:  Julien Lee is a 59 year-old with a history of follicular lymphoma diagnosed originally in 2002. He was considered stage DERIK with grade 1/3 follicular lymphoma.  Disease was noted in the lymph nodes within the abdomen and inguinal regions, plus interstitial involvement in the bone marrow.  Over the years, he has had disease at other sites, including above the diaphragm, neck, axillae, etc.  He has received multiple treatments including oral chlorambucil (- ), Rituximab (), chlorambucil again in  for a pleural effusion, R-CVP x 6 cycles from 2009-2010, radiation (2660 cGy) to the right neck in -2010 for a persistent local problem, and most recently 6 cycles of rituximab plus bendamustine from 10/2014 - 2015.  He has not required additional therapy over the past 8 years.     Mr. Lee's wife  in 2011 of a brain tumor.  He has subsequently raised their 3 children as a single parent.     HISTORY OF PRESENT ILLNESS:  Mr. Lee has been feeling well. . He has not had any fevers or chills, abdominal pain, or early satiety. He noticed a  swollen mass over the left upper chest wall Non tender No new infections. He now uses CPAP. Weight continue to be an issue but recently approved for zepbound. Has been getting dental implants and now completed Started losartan 50mg for BP He had a precancerous skin lesion lentiginous compound nevus with severe atypia, removed one month ago from right triceps  REVIEW OF SYSTEMS:  A 10-point review otherwise negative.      MEDICATIONS:  Aspirin 81mg daily     ALLERGIES:  None reported.      ROS: 12 pt ROS neg except as in HPI  PMH FH and SH: see note from 2022    PHYSICAL EXAMINATION:    /79   Pulse 112   Temp 98  F (36.7  C) (Tympanic)   Resp 18   Ht 1.854 m (6' 1\")   Wt (!) 139.7 kg (308 lb)   SpO2 95%   BMI 40.64 kg/m       GENERAL: Obese alert, in no apparent " distress; no palpable cervical, axillary, inguinal lymphadenopathy  HEENT:  no scleral icterus, moist mucous membranes  edentulaous has false teeth  CARDIAC: regular rate and rhythm  RESPIRATORY: clear to auscultation bilaterally   ABDOMEN: Obese BS ok Umbilical hernia no hepatosplenomegaly  SKIN: no bruising or rash on visible skin Lipoma 2cm on right scapula patch quarter size psoriasis right wrist  EXTREMITIES: dry skin No pitting edema.  NEUROLOGIC: Cranial nerves 2-12 grossly intact.    LYMPH No cervical Inguinal or Axillary nodes  A [id number] mas belwo the clavicle on the left chest wall non tender not rock hard.    ASSESSMENT AND PLAN:      1. Follicular lymphoma    2. Hypertension  3. Obesity  4. Obstructive Sleep apnea.   5. Lipoma on back  6. Psoriasis right wrist  7. Lentiginous compound nevus with severe atypia, right arm resected  Patient is now ~23 years following diagnosis of follicular lymphoma and is in CR. He has not received therapy since 03/2015. CT scans Oct 2019 show no significant changes.  Done noted a lump and his left upper chest wall and monitored it for couple of weeks and called about having it checked out today I think it is worth considering a PET scan urgently and a biopsy..  He has no systemic symptoms and no other lymphadenopathy.  I will check blood work including CBC CMP LDH CRP sed rate and SPEP.  Ultimately he may need a an excisional biopsy but I think at wait until we get the PET scan.  I am not sure if this is lymphoma or a lipoma or hematoma based on my exam.  That he is losing weight and that he is going to start on Zepbound which also will help his sleep apnea.  Recommendation  PET/CT  Consider biopsy  Lab work as above  Return to clinic in 3 weeks    I spent 30 minutes on  reviewing records both in house and externally, including laboratory and pathology and radiology preparing for today's visit   The longitudinal plan of care for the diagnosis(es)/condition(s) as  documented were addressed during this visit. Due to the added complexity in care, I will continue to support Isaías in the subsequent management and with ongoing continuity of care.     Fuad Vega MD  [phone]

## 2025-03-11 NOTE — NURSING NOTE
"Oncology Rooming Note    March 11, 2025 12:09 PM   Julien Lee is a 59 year old male who presents for:    Chief Complaint   Patient presents with    Oncology Clinic Visit     Follicular lymphoma     Initial Vitals: /79   Pulse 112   Temp 98  F (36.7  C) (Tympanic)   Resp 18   Ht 1.854 m (6' 1\")   Wt (!) 139.7 kg (308 lb)   SpO2 95%   BMI 40.64 kg/m   Estimated body mass index is 40.64 kg/m  as calculated from the following:    Height as of this encounter: 1.854 m (6' 1\").    Weight as of this encounter: 139.7 kg (308 lb). Body surface area is 2.68 meters squared.  No Pain (0) Comment: Data Unavailable   No LMP for male patient.  Allergies reviewed: Yes  Medications reviewed: Yes    Medications: Medication refills not needed today.  Pharmacy name entered into [employer]:    [id number] [place] [place] [facility] [address] [place] [address] PHARMACY [place], [place] - [address] [place] [address] [phone]    Frailty Screening:   Is the patient here for a new oncology consult visit in cancer care? 2. No    PHQ9:  Did this patient require a PHQ9?: No      Clinical concerns: none      Brandt Sexton LPN              "

## 2025-03-11 NOTE — LETTER
3/11/2025      Julien Lee  [address] [address]      Dear Colleague,    Thank you for referring your patient, Julien Lee, to the [facility] [place]. Please see a copy of my visit note below.    [facility]      ONCOLOGY SUMMARY:  Julien Lee is a 59 year-old with a history of follicular lymphoma diagnosed originally in 2002. He was considered stage DERIK with grade 1/3 follicular lymphoma.  Disease was noted in the lymph nodes within the abdomen and inguinal regions, plus interstitial involvement in the bone marrow.  Over the years, he has had disease at other sites, including above the diaphragm, neck, axillae, etc.  He has received multiple treatments including oral chlorambucil (- ), Rituximab (), chlorambucil again in  for a pleural effusion, R-CVP x 6 cycles from 2009-2010, radiation (2660 cGy) to the right neck in -2010 for a persistent local problem, and most recently 6 cycles of rituximab plus bendamustine from 10/2014 - 2015.  He has not required additional therapy over the past 8 years.     Mr. Lee's wife  in 2011 of a brain tumor.  He has subsequently raised their 3 children as a single parent.     HISTORY OF PRESENT ILLNESS:  Mr. Lee has been feeling well. . He has not had any fevers or chills, abdominal pain, or early satiety. He noticed a  swollen mass over the left upper chest wall Non tender No new infections. He now uses CPAP. Weight continue to be an issue but recently approved for zepbound. Has been getting dental implants and now completed Started losartan 50mg for BP He had a precancerous skin lesion lentiginous compound nevus with severe atypia, removed one month ago from right triceps  REVIEW OF SYSTEMS:  A 10-point review otherwise negative.      MEDICATIONS:  Aspirin 81mg daily     ALLERGIES:  None reported.      ROS: 12 pt ROS neg except  "as in HPI  PMH FH and SH: see note from April 2022    PHYSICAL EXAMINATION:    /79   Pulse 112   Temp 98  F (36.7  C) (Tympanic)   Resp 18   Ht 1.854 m (6' 1\")   Wt (!) 139.7 kg (308 lb)   SpO2 95%   BMI 40.64 kg/m       GENERAL: Obese alert, in no apparent distress; no palpable cervical, axillary, inguinal lymphadenopathy  HEENT:  no scleral icterus, moist mucous membranes  edentulaous has false teeth  CARDIAC: regular rate and rhythm  RESPIRATORY: clear to auscultation bilaterally   ABDOMEN: Obese BS ok Umbilical hernia no hepatosplenomegaly  SKIN: no bruising or rash on visible skin Lipoma 2cm on right scapula patch quarter size psoriasis right wrist  EXTREMITIES: dry skin No pitting edema.  NEUROLOGIC: Cranial nerves 2-12 grossly intact.    LYMPH No cervical Inguinal or Axillary nodes  A [id number] mas belwo the clavicle on the left chest wall non tender not rock hard.    ASSESSMENT AND PLAN:      1. Follicular lymphoma    2. Hypertension  3. Obesity  4. Obstructive Sleep apnea.   5. Lipoma on back  6. Psoriasis right wrist  7. Lentiginous compound nevus with severe atypia, right arm resected  Patient is now ~23 years following diagnosis of follicular lymphoma and is in CR. He has not received therapy since 03/2015. CT scans Oct 2019 show no significant changes.  Done noted a lump and his left upper chest wall and monitored it for couple of weeks and called about having it checked out today I think it is worth considering a PET scan urgently and a biopsy..  He has no systemic symptoms and no other lymphadenopathy.  I will check blood work including CBC CMP LDH CRP sed rate and SPEP.  Ultimately he may need a an excisional biopsy but I think at wait until we get the PET scan.  I am not sure if this is lymphoma or a lipoma or hematoma based on my exam.  That he is losing weight and that he is going to start on Zepbound which also will help his sleep apnea.  Recommendation  PET/CT  Consider biopsy  Lab " work as above  Return to clinic in 3 weeks    I spent 30 minutes on  reviewing records both in house and externally, including laboratory and pathology and radiology preparing for today's visit   The longitudinal plan of care for the diagnosis(es)/condition(s) as documented were addressed during this visit. Due to the added complexity in care, I will continue to support Isaías in the subsequent management and with ongoing continuity of care.     Fuad Vega MD  [phone]      Again, thank you for allowing me to participate in the care of your patient.        Sincerely,        Fuad Vega MD    Electronically signed

## 2025-03-12 LAB
ALBUMIN SERPL ELPH-MCNC: 4.5 G/DL (ref 3.7–5.1)
ALPHA1 GLOB SERPL ELPH-MCNC: 0.2 G/DL (ref 0.2–0.4)
ALPHA2 GLOB SERPL ELPH-MCNC: 0.6 G/DL (ref 0.5–0.9)
B-GLOBULIN SERPL ELPH-MCNC: 0.7 G/DL (ref 0.6–1)
GAMMA GLOB SERPL ELPH-MCNC: 0.8 G/DL (ref 0.7–1.6)
LOCATION OF TASK: NORMAL
M PROTEIN SERPL ELPH-MCNC: 0 G/DL
PROT PATTERN SERPL ELPH-IMP: NORMAL

## 2025-03-25 ENCOUNTER — ANCILLARY ORDERS (OUTPATIENT)
Dept: TRANSPLANT | Facility: CLINIC | Age: 60
End: 2025-03-25
Payer: COMMERCIAL

## 2025-03-25 ENCOUNTER — PATIENT OUTREACH (OUTPATIENT)
Dept: ONCOLOGY | Facility: CLINIC | Age: 60
End: 2025-03-25
Payer: COMMERCIAL

## 2025-03-25 DIAGNOSIS — C82.80 OTHER TYPE OF FOLLICULAR LYMPHOMA, UNSPECIFIED BODY REGION (H): Primary | ICD-10-CM

## 2025-03-25 DIAGNOSIS — G47.33 OSA (OBSTRUCTIVE SLEEP APNEA): ICD-10-CM

## 2025-03-25 NOTE — PROGRESS NOTES
[facility]: Cancer Care                                                                                          Per Dr. Aric Vega, pt likely has reoccurance of his follicular lymphoma so he is understandably frustrated that PET scan was denied.  CT scans of neck, chest, abd/pelvis have been approved so plan is to cancel PET and perform CTs stat, before pt's return to clinic appt on 11:31.  Called and spoke with pt today to let him know that Dr. Vega changed his PET scan to CT chest abd/pelvis scan as the PET was denied by insurance.  Discussed should be able to see enough information from CT scans and sometimes based on CT scans, will request a PET scan after CT scans.  Reviewed location of scans at [facility] and he is to check in at [facility] waiting room on street level of [facility].  Also stated CT of neck is to be thorough about scans re history of follicular lymphoma and because he had previous in the neck area.  Pt appreciated call and discussion.      Signature:  Irina Young RN, OCN

## 2025-03-27 ENCOUNTER — PATIENT OUTREACH (OUTPATIENT)
Dept: ONCOLOGY | Facility: CLINIC | Age: 60
End: 2025-03-27
Payer: COMMERCIAL

## 2025-03-27 NOTE — PROGRESS NOTES
[facility]: Cancer Care                                                                                          Left message for pt asking if possible to change his Wed, 4/2, return appt with Dr. Aric Vega from labs at 12:00 and seeing him at 12:30 to labs about 12:15-12:30 and visit with Dr. Vega at 1:00.  Requested call back to discuss this possibility (or a MyChart message).    Signature:  Irina Young RN, OCN

## 2025-03-29 DIAGNOSIS — C82.80 OTHER TYPE OF FOLLICULAR LYMPHOMA, UNSPECIFIED BODY REGION (H): Primary | ICD-10-CM

## 2025-03-29 NOTE — PROGRESS NOTES
"[facility] ONC CLINIC      ONCOLOGY SUMMARY:  Julien Lee is a 59 year-old with a history of follicular lymphoma diagnosed originally in 2002. He was considered stage DERIK with grade 1/3 follicular lymphoma.  Disease was noted in the lymph nodes within the abdomen and inguinal regions, plus interstitial involvement in the bone marrow.  Over the years, he has had disease at other sites, including above the diaphragm, neck, axillae, etc.  He has received multiple treatments including oral chlorambucil (- ), Rituximab (), chlorambucil again in  for a pleural effusion, R-CVP x 6 cycles from 2009-2010, radiation (2660 cGy) to the right neck in -2010 for a persistent local problem, and most recently 6 cycles of rituximab plus bendamustine from 10/2014 - 2015.  He has not required additional therapy over the past 10 years.     Mr. Lee's wife  in 2011 of a brain tumor.  He has subsequently raised their 3 children as a single parent.     HISTORY OF PRESENT ILLNESS: Isaías is feeling well and was unable to obtain a CT PET scan due to insurance refusal.  He did have a CT of the neck chest abdomen and pelvis.  This did not define as seen below a 3 x 2 cm mass over the pectoral muscles in the left chest wall.  Isaías denies any trauma or any injury to the chest wall he denies any fevers chills night sweats or weight loss.  REVIEW OF SYSTEMS:  A 10-point review otherwise negative.      MEDICATIONS:  Aspirin 81mg daily     ALLERGIES:  None reported.      ROS: 12 pt ROS neg except as in HPI  PMH FH and SH: see note from     PHYSICAL EXAMINATION:    /81   Pulse 106   Temp 98.1  F (36.7  C) (Tympanic)   Resp 18   Ht 1.854 m (6' 1\")   Wt 133.8 kg (295 lb)   SpO2 98%   BMI 38.92 kg/m         GENERAL: Obese alert, in no apparent distress; no palpable cervical, axillary, inguinal lymphadenopathy  HEENT:  no scleral icterus, moist mucous membranes  edentulaous " has false teeth  CARDIAC: regular rate and rhythm  RESPIRATORY: clear to auscultation bilaterally   ABDOMEN: Obese BS ok Umbilical hernia no hepatosplenomegaly  SKIN: no bruising or rash on visible skin Lipoma 2cm on right scapula patch quarter size psoriasis right wrist  EXTREMITIES: dry skin No pitting edema.  NEUROLOGIC: Cranial nerves 2-12 grossly intact.    LYMPH No cervical Inguinal or Axillary nodes  A [id number] mas belwo the clavicle on the left chest wall non tender not rock hard.    ASSESSMENT AND PLAN:      1. Follicular lymphoma    2. Hypertension  3. Obesity  4. Obstructive Sleep apnea.   5. Lipoma on back  6. Psoriasis right wrist  7. Lentiginous compound nevus with severe atypia, right arm resected  8. Left chest wall mass  Patient is now ~23 years following diagnosis of follicular lymphoma and is in CR. He has not received therapy since 03/2015. CT scans Oct 2019 show no significant changes.  Isaías.  Noted a lump on his left upper chest wall and monitored it for couple of weeks the CT noted below did show the mass and some other lymphadenopathy but nothing really new compared to previous scans suggesting systemic involvement by lymphoma.  I did do a flow cytometry which fortunately did not show any B cells in the peripheral blood worrisome for recurrent lymphoma.  I spoke with interventional radiology and they suggested that breast radiology should see him and have ordered a digital mammogram and ultrasound with potential needle biopsy of the left chest wall mass.  .  I am not sure if this is lymphoma or breast cancer  or a lipoma or hematoma based on my exam.  That he is losing weight and that he is going to start on Zepbound which also will help his sleep apnea.  Recommendation  Digital mammogram  US left breast  Bx after evaluation of the above scans  Malignant Heme referral  Return to clinic in 3 weeks    I spent 30 minutes on  reviewing records both in house and externally, including laboratory and  pathology and radiology preparing for today's visit   The longitudinal plan of care for the diagnosis(es)/condition(s) as documented were addressed during this visit. Due to the added complexity in care, I will continue to support Isaías in the subsequent management and with ongoing continuity of care.     Fuad Vega MD  [phone]   Latest Reference Range & Units 03/31/25 14:10   Sodium 135 - 145 mmol/L 140   Potassium 3.4 - 5.3 mmol/L 4.2   Chloride 98 - 107 mmol/L 105   Carbon Dioxide (CO2) 22 - 29 mmol/L 23   Urea Nitrogen 8.0 - 23.0 mg/dL 16.8   Creatinine 0.67 - 1.17 mg/dL 1.13   GFR Estimate >60 mL/min/1.73m2 74   Calcium 8.8 - 10.4 mg/dL 10.1   Anion Gap 7 - 15 mmol/L 12   Magnesium 1.7 - 2.3 mg/dL 2.1   Phosphorus 2.5 - 4.5 mg/dL 1.8 (L)   Albumin 3.5 - 5.2 g/dL 4.7   Protein Total 6.4 - 8.3 g/dL 7.3   Alkaline Phosphatase 40 - 150 U/L 84   ALT 0 - 70 U/L 48   AST 0 - 45 U/L 28   Bilirubin Total <=1.2 mg/dL 0.5   CRP Inflammation <5.00 mg/L 4.08   Glucose 70 - 99 mg/dL 88   Lactate Dehydrogenase 0 - 250 U/L 208   Uric Acid 3.4 - 7.0 mg/dL 8.8 (H)   WBC 4.0 - 11.0 10e3/uL 6.9   Hemoglobin 13.3 - 17.7 g/dL 16.2   Hematocrit 40.0 - 53.0 % 45.4   Platelet Count 150 - 450 10e3/uL 210   RBC Count 4.40 - 5.90 10e6/uL 5.24   MCV 78 - 100 fL 87   MCH 26.5 - 33.0 pg 30.9   MCHC 31.5 - 36.5 g/dL 35.7   RDW 10.0 - 15.0 % 12.1   % Neutrophils % 59   % Lymphocytes % 28   % Monocytes % 10   % Eosinophils % 2   % Basophils % 1   Absolute Basophils 0.0 - 0.2 10e3/uL 0.1   Absolute Eosinophils 0.0 - 0.7 10e3/uL 0.1   Absolute Immature Granulocytes <=0.4 10e3/uL 0.0   Absolute Lymphocytes 0.8 - 5.3 10e3/uL 2.0   Absolute Monocytes 0.0 - 1.3 10e3/uL 0.7   % Immature Granulocytes % 1   Absolute Neutrophils 1.6 - 8.3 10e3/uL 4.1   Absolute NRBCs 10e3/uL 0.0   NRBCs per 100 WBC <1 /100 0   Sed Rate 0 - 20 mm/hr 4   INR 0.85 - 1.15  1.19 (H)   PTT 22 - 38 Seconds 28   Fibrinogen 170 - 510 mg/dL 283   (L): Data is  abnormally low  (H): Data is abnormally high  Narrative & Impression   EXAMINATION: CT CHEST/ABDOMEN/PELVIS W CONTRAST, 3/31/2025 4:03 PM     TECHNIQUE:  Helical CT images from the thoracic inlet through the  symphysis pubis were obtained with IV contrast. Contrast dose:  iopamidol (ISOVUE-370) solution 135 mL     COMPARISON: Multiple CTs dating back to 10/9/2013     HISTORY: Pt with B cell lymphoma now with new mass over left chest  wall; Other type of follicular lymphoma, unspecified body region (H)     FINDINGS:     Devices: None.     CHEST:  MEDIASTINUM: Normal heart size. No pericardial effusion. Normal  caliber ascending aorta. Normal caliber main pulmonary artery. Slight  enlargement of a right lower paratracheal lymph node measuring 1.1 x  0.9 cm (5/67), previously 0.9 x 0.8 cm. Several additional  prominent/mildly enlarged mediastinal lymph nodes are stable dating  back to 2019, for example subcarinal lymph node measuring 1.4 x 1.2 cm  (5/106, previously 1.5 x 1.1 cm.     LUNGS: Central tracheobronchial tree is patent. No pneumothorax or  pleural effusion. No focal airspace opacity. Several calcified and  noncalcified pulmonary nodules are stable dating back to 10/22/2019  CT.     Atrophic right thyroid lobe.     ABDOMEN/PELVIS:  LIVER: No suspicious hepatic lesion. Simple attenuating hepatic cysts.  No intrahepatic or extrahepatic biliary ductal dilation.     GALLBLADDER: Normal gallbladder.      PANCREAS: No focal pancreatic lesion. Moderate fatty infiltration of  the parenchyma. The main pancreatic duct is not dilated.     SPLEEN: Stable mild splenomegaly at 13 cm anteroposterior dimension.  Subcentimeter hypodensity in the inferior parenchyma is too small to  characterize.     ADRENAL GLANDS: No focal adrenal nodule.     URINARY TRACT: No suspicious renal lesion.  No hydronephrosis or  hydroureter.  No nephrolithiasis. Too small to characterize left renal  cortical hypodensities, likely cysts.      BLADDER: Within normal limits.     REPRODUCTIVE ORGANS: Prostate is not enlarged. Partially imaged right  hydrocele.     GASTROINTESTINAL TRACT: Normal caliber large and small bowel.  Normal  appendix. Colonic diverticulosis.     PERITONEUM/MESENTERY: No free fluid. No free air.     VESSELS: The aorta and major branch vessels are patent. The main  portal, splenic and proximal superior mesenteric veins are patent. No  significant atherosclerotic disease.     LYMPH NODES: Grossly unchanged distal left external iliac/proximal  common femoral node measuring up to 1.7 cm (series 5, image 418),  previously measuring 1.9 cm. Stable prominent subcentimeter bilateral  inguinal lymph nodes.     BONES: No acute or suspicious osseous lesions. Multilevel degenerative  changes in the spine.     SOFT TISSUES: 3.0 x 2.5 cm lesion superficial to the left pectoralis  major muscle (series 5, image 25). This lesion abuts the superficial  portion of the pectoralis musculature (series 5, image 32). Small  fat-containing umbilical hernia.                                                                      IMPRESSION:   1. Solid subcutaneous mass overlying the left pectoralis major  musculature measuring up to 3.0 cm is indeterminate, neoplastic  process is the primary concern. Recommend correlation with tissue  sampling.  2. Slight increase in size of a borderline enlarged right lower  paratracheal lymph node. Several additional prominent/mildly enlarged  mediastinal lymph nodes are stable dating back to 2019. Stable mildly  enlarged left external iliac/deep inguinal lymph node.   3. No acute findings in the chest, abdomen or pelvis.      I have personally reviewed the examination and initial interpretation  and I agree with the findings.     Flow Cytometry Report                             Case: [id number]                                   Authorizing Provider:  Fuad Vega MD Collected:           03/31/2025 02:10 PM            Ordering Location:     [facility]  Received:            03/31/2025 02:11 PM                                  Cancer Clinic                                                                 Pathologist:           Cate Keith MD                                                     Specimen:    Peripheral Blood                                                                          Flow Interpretation   A. Peripheral Blood:  -Polytypic B cells  -See comment      Electronically signed by Cate Keith MD on 4/1/2025 at  1:33 PM   Comment    There is no immunophenotypic evidence of B cell non-Hodgkin lymphoma. Circulating neoplastic cells are not always present in lymphoma; therefore, the peripheral blood findings do not rule out underlying disease elsewhere. Final interpretation requires correlation with morphologic and clinical features.      Flow Phenotypic Data    Unless otherwise indicated, percentages reported below are based on the total number of CD45 positive viable leukocytes. If applicable, percentage of plasma cells is from total viable nucleated cells.     6% polytypic B cells     Case was reviewed by the following:  Resident Pathologist: Ryne Ewing MD

## 2025-03-31 ENCOUNTER — HOSPITAL ENCOUNTER (OUTPATIENT)
Dept: CT IMAGING | Facility: CLINIC | Age: 60
Discharge: HOME | End: 2025-03-31
Attending: INTERNAL MEDICINE
Payer: COMMERCIAL

## 2025-03-31 ENCOUNTER — LAB (OUTPATIENT)
Dept: LAB | Facility: CLINIC | Age: 60
End: 2025-03-31
Payer: COMMERCIAL

## 2025-03-31 DIAGNOSIS — C82.80 OTHER TYPE OF FOLLICULAR LYMPHOMA, UNSPECIFIED BODY REGION (H): ICD-10-CM

## 2025-03-31 DIAGNOSIS — G47.33 OSA (OBSTRUCTIVE SLEEP APNEA): ICD-10-CM

## 2025-03-31 LAB
ALBUMIN SERPL BCG-MCNC: 4.7 G/DL (ref 3.5–5.2)
ALP SERPL-CCNC: 84 U/L (ref 40–150)
ALT SERPL W P-5'-P-CCNC: 48 U/L (ref 0–70)
ANION GAP SERPL CALCULATED.3IONS-SCNC: 12 MMOL/L (ref 7–15)
APTT PPP: 28 SECONDS (ref 22–38)
AST SERPL W P-5'-P-CCNC: 28 U/L (ref 0–45)
BASOPHILS # BLD AUTO: 0.1 10E3/UL (ref 0–0.2)
BASOPHILS NFR BLD AUTO: 1 %
BILIRUB SERPL-MCNC: 0.5 MG/DL
BUN SERPL-MCNC: 16.8 MG/DL (ref 8–23)
CALCIUM SERPL-MCNC: 10.1 MG/DL (ref 8.8–10.4)
CHLORIDE SERPL-SCNC: 105 MMOL/L (ref 98–107)
CREAT SERPL-MCNC: 1.13 MG/DL (ref 0.67–1.17)
CRP SERPL-MCNC: 4.08 MG/L
EGFRCR SERPLBLD CKD-EPI 2021: 74 ML/MIN/1.73M2
EOSINOPHIL # BLD AUTO: 0.1 10E3/UL (ref 0–0.7)
EOSINOPHIL NFR BLD AUTO: 2 %
ERYTHROCYTE [DISTWIDTH] IN BLOOD BY AUTOMATED COUNT: 12.1 % (ref 10–15)
ERYTHROCYTE [SEDIMENTATION RATE] IN BLOOD BY WESTERGREN METHOD: 4 MM/HR (ref 0–20)
FIBRINOGEN PPP-MCNC: 283 MG/DL (ref 170–510)
GLUCOSE SERPL-MCNC: 88 MG/DL (ref 70–99)
HCO3 SERPL-SCNC: 23 MMOL/L (ref 22–29)
HCT VFR BLD AUTO: 45.4 % (ref 40–53)
HGB BLD-MCNC: 16.2 G/DL (ref 13.3–17.7)
IMM GRANULOCYTES # BLD: 0 10E3/UL
IMM GRANULOCYTES NFR BLD: 1 %
INR PPP: 1.19 (ref 0.85–1.15)
LDH SERPL L TO P-CCNC: 208 U/L (ref 0–250)
LYMPHOCYTES # BLD AUTO: 2 10E3/UL (ref 0.8–5.3)
LYMPHOCYTES NFR BLD AUTO: 28 %
MAGNESIUM SERPL-MCNC: 2.1 MG/DL (ref 1.7–2.3)
MCH RBC QN AUTO: 30.9 PG (ref 26.5–33)
MCHC RBC AUTO-ENTMCNC: 35.7 G/DL (ref 31.5–36.5)
MCV RBC AUTO: 87 FL (ref 78–100)
MONOCYTES # BLD AUTO: 0.7 10E3/UL (ref 0–1.3)
MONOCYTES NFR BLD AUTO: 10 %
NEUTROPHILS # BLD AUTO: 4.1 10E3/UL (ref 1.6–8.3)
NEUTROPHILS NFR BLD AUTO: 59 %
NRBC # BLD AUTO: 0 10E3/UL
NRBC BLD AUTO-RTO: 0 /100
PHOSPHATE SERPL-MCNC: 1.8 MG/DL (ref 2.5–4.5)
PLATELET # BLD AUTO: 210 10E3/UL (ref 150–450)
POTASSIUM SERPL-SCNC: 4.2 MMOL/L (ref 3.4–5.3)
PROT SERPL-MCNC: 7.3 G/DL (ref 6.4–8.3)
RBC # BLD AUTO: 5.24 10E6/UL (ref 4.4–5.9)
SODIUM SERPL-SCNC: 140 MMOL/L (ref 135–145)
URATE SERPL-MCNC: 8.8 MG/DL (ref 3.4–7)
WBC # BLD AUTO: 6.9 10E3/UL (ref 4–11)

## 2025-03-31 PROCEDURE — 80053 COMPREHEN METABOLIC PANEL: CPT | Performed by: PATHOLOGY

## 2025-03-31 PROCEDURE — 88184 FLOWCYTOMETRY/ TC 1 MARKER: CPT | Performed by: INTERNAL MEDICINE

## 2025-03-31 PROCEDURE — 85730 THROMBOPLASTIN TIME PARTIAL: CPT | Performed by: PATHOLOGY

## 2025-03-31 PROCEDURE — 85610 PROTHROMBIN TIME: CPT | Performed by: PATHOLOGY

## 2025-03-31 PROCEDURE — 74177 CT ABD & PELVIS W/CONTRAST: CPT | Mod: 26 | Performed by: RADIOLOGY

## 2025-03-31 PROCEDURE — 250N000011 HC RX IP 250 OP 636: Performed by: INTERNAL MEDICINE

## 2025-03-31 PROCEDURE — 88184 FLOWCYTOMETRY/ TC 1 MARKER: CPT | Performed by: STUDENT IN AN ORGANIZED HEALTH CARE EDUCATION/TRAINING PROGRAM

## 2025-03-31 PROCEDURE — 88185 FLOWCYTOMETRY/TC ADD-ON: CPT | Performed by: INTERNAL MEDICINE

## 2025-03-31 PROCEDURE — 70491 CT SOFT TISSUE NECK W/DYE: CPT

## 2025-03-31 PROCEDURE — 82784 ASSAY IGA/IGD/IGG/IGM EACH: CPT | Performed by: INTERNAL MEDICINE

## 2025-03-31 PROCEDURE — 85025 COMPLETE CBC W/AUTO DIFF WBC: CPT | Performed by: PATHOLOGY

## 2025-03-31 PROCEDURE — 85652 RBC SED RATE AUTOMATED: CPT | Performed by: PATHOLOGY

## 2025-03-31 PROCEDURE — 83615 LACTATE (LD) (LDH) ENZYME: CPT | Performed by: PATHOLOGY

## 2025-03-31 PROCEDURE — 99000 SPECIMEN HANDLING OFFICE-LAB: CPT | Performed by: PATHOLOGY

## 2025-03-31 PROCEDURE — 84550 ASSAY OF BLOOD/URIC ACID: CPT | Performed by: PATHOLOGY

## 2025-03-31 PROCEDURE — 74177 CT ABD & PELVIS W/CONTRAST: CPT

## 2025-03-31 PROCEDURE — 83735 ASSAY OF MAGNESIUM: CPT | Performed by: PATHOLOGY

## 2025-03-31 PROCEDURE — 36415 COLL VENOUS BLD VENIPUNCTURE: CPT | Performed by: PATHOLOGY

## 2025-03-31 PROCEDURE — 71260 CT THORAX DX C+: CPT

## 2025-03-31 PROCEDURE — 84100 ASSAY OF PHOSPHORUS: CPT | Performed by: PATHOLOGY

## 2025-03-31 PROCEDURE — 88188 FLOWCYTOMETRY/READ 9-15: CPT | Performed by: STUDENT IN AN ORGANIZED HEALTH CARE EDUCATION/TRAINING PROGRAM

## 2025-03-31 PROCEDURE — 70491 CT SOFT TISSUE NECK W/DYE: CPT | Mod: 26 | Performed by: RADIOLOGY

## 2025-03-31 PROCEDURE — 86140 C-REACTIVE PROTEIN: CPT | Performed by: PATHOLOGY

## 2025-03-31 PROCEDURE — 85384 FIBRINOGEN ACTIVITY: CPT | Performed by: PATHOLOGY

## 2025-03-31 PROCEDURE — 71260 CT THORAX DX C+: CPT | Mod: 26 | Performed by: RADIOLOGY

## 2025-03-31 RX ORDER — IOPAMIDOL 755 MG/ML
135 INJECTION, SOLUTION INTRAVASCULAR ONCE
Status: COMPLETED | OUTPATIENT
Start: 2025-03-31 | End: 2025-03-31

## 2025-03-31 RX ADMIN — IOPAMIDOL 135 ML: 755 INJECTION, SOLUTION INTRAVENOUS at 14:24

## 2025-04-01 LAB
PATH REPORT.COMMENTS IMP SPEC: NORMAL
PATH REPORT.FINAL DX SPEC: NORMAL
PATH REPORT.MICROSCOPIC SPEC OTHER STN: NORMAL
PATH REPORT.RELEVANT HX SPEC: NORMAL

## 2025-04-02 ENCOUNTER — ANCILLARY ORDERS (OUTPATIENT)
Dept: TRANSPLANT | Facility: CLINIC | Age: 60
End: 2025-04-02

## 2025-04-02 ENCOUNTER — ONCOLOGY VISIT (OUTPATIENT)
Dept: TRANSPLANT | Facility: CLINIC | Age: 60
End: 2025-04-02
Attending: INTERNAL MEDICINE
Payer: COMMERCIAL

## 2025-04-02 ENCOUNTER — ANCILLARY PROCEDURE (OUTPATIENT)
Dept: MAMMOGRAPHY | Facility: CLINIC | Age: 60
End: 2025-04-02
Attending: INTERNAL MEDICINE
Payer: COMMERCIAL

## 2025-04-02 VITALS
RESPIRATION RATE: 18 BRPM | OXYGEN SATURATION: 98 % | SYSTOLIC BLOOD PRESSURE: 133 MMHG | DIASTOLIC BLOOD PRESSURE: 81 MMHG | HEART RATE: 106 BPM | WEIGHT: 295 LBS | TEMPERATURE: 98.1 F | HEIGHT: 73 IN | BODY MASS INDEX: 39.1 KG/M2

## 2025-04-02 DIAGNOSIS — R22.2 CHEST WALL MASS: ICD-10-CM

## 2025-04-02 DIAGNOSIS — C82.80 OTHER TYPE OF FOLLICULAR LYMPHOMA, UNSPECIFIED BODY REGION (H): Primary | ICD-10-CM

## 2025-04-02 DIAGNOSIS — C82.80 OTHER TYPE OF FOLLICULAR LYMPHOMA, UNSPECIFIED BODY REGION (H): ICD-10-CM

## 2025-04-02 PROCEDURE — 99212 OFFICE O/P EST SF 10 MIN: CPT | Performed by: INTERNAL MEDICINE

## 2025-04-02 PROCEDURE — 77066 DX MAMMO INCL CAD BI: CPT | Performed by: STUDENT IN AN ORGANIZED HEALTH CARE EDUCATION/TRAINING PROGRAM

## 2025-04-02 PROCEDURE — 76642 ULTRASOUND BREAST LIMITED: CPT | Mod: LT | Performed by: STUDENT IN AN ORGANIZED HEALTH CARE EDUCATION/TRAINING PROGRAM

## 2025-04-02 ASSESSMENT — PAIN SCALES - GENERAL: PAINLEVEL_OUTOF10: NO PAIN (0)

## 2025-04-02 NOTE — NURSING NOTE
"Oncology Rooming Note    April 2, 2025 12:54 PM   Julien Lee is a 59 year old male who presents for:    No chief complaint on file.    Initial Vitals: /81   Pulse 106   Temp 98.1  F (36.7  C) (Tympanic)   Resp 18   Ht 1.854 m (6' 1\")   Wt 133.8 kg (295 lb)   SpO2 98%   BMI 38.92 kg/m   Estimated body mass index is 38.92 kg/m  as calculated from the following:    Height as of this encounter: 1.854 m (6' 1\").    Weight as of this encounter: 133.8 kg (295 lb). Body surface area is 2.63 meters squared.  No Pain (0) Comment: Data Unavailable   No LMP for male patient.  Allergies reviewed: Yes  Medications reviewed: Yes    Medications: Medication refills not needed today.  Pharmacy name entered into [employer]:    [id number] [place], [place] - [address] [place] [address] PHARMACY [place], [place] - [address] [facility] [phone]    Frailty Screening:   Is the patient here for a new oncology consult visit in cancer care? 2. No    PHQ9:  Did this patient require a PHQ9?: No      Clinical concerns: none      Brandt Sexton LPN              "

## 2025-04-02 NOTE — LETTER
2025      Julien Lee  [address] [address]      Dear Colleague,    Thank you for referring your patient, Julien Lee, to the [facility] BLOOD AND MARROW TRANSPLANT PROGRAM [facility]. Please see a copy of my visit note below.    [facility] ONC CLINIC      ONCOLOGY SUMMARY:  Julien Lee is a 59 year-old with a history of follicular lymphoma diagnosed originally in 2002. He was considered stage DERIK with grade 1/3 follicular lymphoma.  Disease was noted in the lymph nodes within the abdomen and inguinal regions, plus interstitial involvement in the bone marrow.  Over the years, he has had disease at other sites, including above the diaphragm, neck, axillae, etc.  He has received multiple treatments including oral chlorambucil (- ), Rituximab (), chlorambucil again in  for a pleural effusion, R-CVP x 6 cycles from 2009-2010, radiation (2660 cGy) to the right neck in -2010 for a persistent local problem, and most recently 6 cycles of rituximab plus bendamustine from 10/2014 - 2015.  He has not required additional therapy over the past 10 years.     Mr. Lee's wife  in 2011 of a brain tumor.  He has subsequently raised their 3 children as a single parent.     HISTORY OF PRESENT ILLNESS: Isaías is feeling well and was unable to obtain a CT PET scan due to insurance refusal.  He did have a CT of the neck chest abdomen and pelvis.  This did not define as seen below a 3 x 2 cm mass over the pectoral muscles in the left chest wall.  Isaías denies any trauma or any injury to the chest wall he denies any fevers chills night sweats or weight loss.  REVIEW OF SYSTEMS:  A 10-point review otherwise negative.      MEDICATIONS:  Aspirin 81mg daily     ALLERGIES:  None reported.      ROS: 12 pt ROS neg except as in HPI  PMH FH and SH: see note from     PHYSICAL EXAMINATION:    /81   Pulse 106   Temp 98.1  F (36.7  C)  "(Tympanic)   Resp 18   Ht 1.854 m (6' 1\")   Wt 133.8 kg (295 lb)   SpO2 98%   BMI 38.92 kg/m         GENERAL: Obese alert, in no apparent distress; no palpable cervical, axillary, inguinal lymphadenopathy  HEENT:  no scleral icterus, moist mucous membranes  edentulaous has false teeth  CARDIAC: regular rate and rhythm  RESPIRATORY: clear to auscultation bilaterally   ABDOMEN: Obese BS ok Umbilical hernia no hepatosplenomegaly  SKIN: no bruising or rash on visible skin Lipoma 2cm on right scapula patch quarter size psoriasis right wrist  EXTREMITIES: dry skin No pitting edema.  NEUROLOGIC: Cranial nerves 2-12 grossly intact.    LYMPH No cervical Inguinal or Axillary nodes  A 1e4lbwq mas belwo the clavicle on the left chest wall non tender not rock hard.    ASSESSMENT AND PLAN:      1. Follicular lymphoma    2. Hypertension  3. Obesity  4. Obstructive Sleep apnea.   5. Lipoma on back  6. Psoriasis right wrist  7. Lentiginous compound nevus with severe atypia, right arm resected  8. Left chest wall mass  Patient is now ~23 years following diagnosis of follicular lymphoma and is in CR. He has not received therapy since 03/2015. CT scans Oct 2019 show no significant changes.  Isaías.  Noted a lump on his left upper chest wall and monitored it for couple of weeks the CT noted below did show the mass and some other lymphadenopathy but nothing really new compared to previous scans suggesting systemic involvement by lymphoma.  I did do a flow cytometry which fortunately did not show any B cells in the peripheral blood worrisome for recurrent lymphoma.  I spoke with interventional radiology and they suggested that breast radiology should see him and have ordered a digital mammogram and ultrasound with potential needle biopsy of the left chest wall mass.  .  I am not sure if this is lymphoma or breast cancer  or a lipoma or hematoma based on my exam.  That he is losing weight and that he is going to start on Zepbound which " also will help his sleep apnea.  Recommendation  Digital mammogram  US left breast  Bx after evaluation of the above scans  Malignant Heme referral  Return to clinic in 3 weeks    I spent 30 minutes on  reviewing records both in house and externally, including laboratory and pathology and radiology preparing for today's visit   The longitudinal plan of care for the diagnosis(es)/condition(s) as documented were addressed during this visit. Due to the added complexity in care, I will continue to support Isaías in the subsequent management and with ongoing continuity of care.     Fuad Vega MD  [phone]   Latest Reference Range & Units 03/31/25 14:10   Sodium 135 - 145 mmol/L 140   Potassium 3.4 - 5.3 mmol/L 4.2   Chloride 98 - 107 mmol/L 105   Carbon Dioxide (CO2) 22 - 29 mmol/L 23   Urea Nitrogen 8.0 - 23.0 mg/dL 16.8   Creatinine 0.67 - 1.17 mg/dL 1.13   GFR Estimate >60 mL/min/1.73m2 74   Calcium 8.8 - 10.4 mg/dL 10.1   Anion Gap 7 - 15 mmol/L 12   Magnesium 1.7 - 2.3 mg/dL 2.1   Phosphorus 2.5 - 4.5 mg/dL 1.8 (L)   Albumin 3.5 - 5.2 g/dL 4.7   Protein Total 6.4 - 8.3 g/dL 7.3   Alkaline Phosphatase 40 - 150 U/L 84   ALT 0 - 70 U/L 48   AST 0 - 45 U/L 28   Bilirubin Total <=1.2 mg/dL 0.5   CRP Inflammation <5.00 mg/L 4.08   Glucose 70 - 99 mg/dL 88   Lactate Dehydrogenase 0 - 250 U/L 208   Uric Acid 3.4 - 7.0 mg/dL 8.8 (H)   WBC 4.0 - 11.0 10e3/uL 6.9   Hemoglobin 13.3 - 17.7 g/dL 16.2   Hematocrit 40.0 - 53.0 % 45.4   Platelet Count 150 - 450 10e3/uL 210   RBC Count 4.40 - 5.90 10e6/uL 5.24   MCV 78 - 100 fL 87   MCH 26.5 - 33.0 pg 30.9   MCHC 31.5 - 36.5 g/dL 35.7   RDW 10.0 - 15.0 % 12.1   % Neutrophils % 59   % Lymphocytes % 28   % Monocytes % 10   % Eosinophils % 2   % Basophils % 1   Absolute Basophils 0.0 - 0.2 10e3/uL 0.1   Absolute Eosinophils 0.0 - 0.7 10e3/uL 0.1   Absolute Immature Granulocytes <=0.4 10e3/uL 0.0   Absolute Lymphocytes 0.8 - 5.3 10e3/uL 2.0   Absolute Monocytes 0.0 - 1.3  10e3/uL 0.7   % Immature Granulocytes % 1   Absolute Neutrophils 1.6 - 8.3 10e3/uL 4.1   Absolute NRBCs 10e3/uL 0.0   NRBCs per 100 WBC <1 /100 0   Sed Rate 0 - 20 mm/hr 4   INR 0.85 - 1.15  1.19 (H)   PTT 22 - 38 Seconds 28   Fibrinogen 170 - 510 mg/dL 283   (L): Data is abnormally low  (H): Data is abnormally high  Narrative & Impression   EXAMINATION: CT CHEST/ABDOMEN/PELVIS W CONTRAST, 3/31/2025 4:03 PM     TECHNIQUE:  Helical CT images from the thoracic inlet through the  symphysis pubis were obtained with IV contrast. Contrast dose:  iopamidol (ISOVUE-370) solution 135 mL     COMPARISON: Multiple CTs dating back to 10/9/2013     HISTORY: Pt with B cell lymphoma now with new mass over left chest  wall; Other type of follicular lymphoma, unspecified body region (H)     FINDINGS:     Devices: None.     CHEST:  MEDIASTINUM: Normal heart size. No pericardial effusion. Normal  caliber ascending aorta. Normal caliber main pulmonary artery. Slight  enlargement of a right lower paratracheal lymph node measuring 1.1 x  0.9 cm (5/67), previously 0.9 x 0.8 cm. Several additional  prominent/mildly enlarged mediastinal lymph nodes are stable dating  back to 2019, for example subcarinal lymph node measuring 1.4 x 1.2 cm  (5/106, previously 1.5 x 1.1 cm.     LUNGS: Central tracheobronchial tree is patent. No pneumothorax or  pleural effusion. No focal airspace opacity. Several calcified and  noncalcified pulmonary nodules are stable dating back to 10/22/2019  CT.     Atrophic right thyroid lobe.     ABDOMEN/PELVIS:  LIVER: No suspicious hepatic lesion. Simple attenuating hepatic cysts.  No intrahepatic or extrahepatic biliary ductal dilation.     GALLBLADDER: Normal gallbladder.      PANCREAS: No focal pancreatic lesion. Moderate fatty infiltration of  the parenchyma. The main pancreatic duct is not dilated.     SPLEEN: Stable mild splenomegaly at 13 cm anteroposterior dimension.  Subcentimeter hypodensity in the inferior  parenchyma is too small to  characterize.     ADRENAL GLANDS: No focal adrenal nodule.     URINARY TRACT: No suspicious renal lesion.  No hydronephrosis or  hydroureter.  No nephrolithiasis. Too small to characterize left renal  cortical hypodensities, likely cysts.     BLADDER: Within normal limits.     REPRODUCTIVE ORGANS: Prostate is not enlarged. Partially imaged right  hydrocele.     GASTROINTESTINAL TRACT: Normal caliber large and small bowel.  Normal  appendix. Colonic diverticulosis.     PERITONEUM/MESENTERY: No free fluid. No free air.     VESSELS: The aorta and major branch vessels are patent. The main  portal, splenic and proximal superior mesenteric veins are patent. No  significant atherosclerotic disease.     LYMPH NODES: Grossly unchanged distal left external iliac/proximal  common femoral node measuring up to 1.7 cm (series 5, image 418),  previously measuring 1.9 cm. Stable prominent subcentimeter bilateral  inguinal lymph nodes.     BONES: No acute or suspicious osseous lesions. Multilevel degenerative  changes in the spine.     SOFT TISSUES: 3.0 x 2.5 cm lesion superficial to the left pectoralis  major muscle (series 5, image 25). This lesion abuts the superficial  portion of the pectoralis musculature (series 5, image 32). Small  fat-containing umbilical hernia.                                                                      IMPRESSION:   1. Solid subcutaneous mass overlying the left pectoralis major  musculature measuring up to 3.0 cm is indeterminate, neoplastic  process is the primary concern. Recommend correlation with tissue  sampling.  2. Slight increase in size of a borderline enlarged right lower  paratracheal lymph node. Several additional prominent/mildly enlarged  mediastinal lymph nodes are stable dating back to 2019. Stable mildly  enlarged left external iliac/deep inguinal lymph node.   3. No acute findings in the chest, abdomen or pelvis.      I have personally reviewed the  examination and initial interpretation  and I agree with the findings.     Flow Cytometry Report                             Case: [id number]                                   Authorizing Provider:  Fuad Vega MD Collected:           03/31/2025 02:10 PM           Ordering Location:     [facility]  Received:            03/31/2025 02:11 PM                                  Cancer Clinic                                                                 Pathologist:           Cate Keith MD                                                     Specimen:    Peripheral Blood                                                                          Flow Interpretation   A. Peripheral Blood:  -Polytypic B cells  -See comment      Electronically signed by Cate Keith MD on 4/1/2025 at  1:33 PM   Comment    There is no immunophenotypic evidence of B cell non-Hodgkin lymphoma. Circulating neoplastic cells are not always present in lymphoma; therefore, the peripheral blood findings do not rule out underlying disease elsewhere. Final interpretation requires correlation with morphologic and clinical features.      Flow Phenotypic Data    Unless otherwise indicated, percentages reported below are based on the total number of CD45 positive viable leukocytes. If applicable, percentage of plasma cells is from total viable nucleated cells.     6% polytypic B cells     Case was reviewed by the following:  Resident Pathologist: Ryne Ewing MD         Again, thank you for allowing me to participate in the care of your patient.        Sincerely,        Fuad Vega MD    Electronically signed

## 2025-04-02 NOTE — CONSULTS
Outpatient IR Referral  04/02/25    Referring Provider: Dr. Vega   IR Referral Request: left pectoral 3 x 3 cm soft tissue mass biopsy hx of follicular lymphoma, new left pectoral mass   Recommendations/Plan: IR defers to Breast Radiology for complete work up.       Case and imaging was reviewed with Dr. Sanchez Rudolph MD and Dr. Reilly   IR recommendations also relayed Epic messaging.    IR referral converted to consult note.    Brief History:    Julien Lee is a 59 year old male with history of HTN, CAROLE, follicular lymphoma diagnosed originally in 07/2002. He was considered stage DERIK with grade 1/3 follicular lymphoma.  Disease was noted in the lymph nodes within the abdomen and inguinal regions, plus interstitial involvement in the bone marrow.  Over the years, he has had disease at other sites, including above the diaphragm, neck, axillae, etc.  He has received multiple treatments including oral chlorambucil (2002- 2003), Rituximab (01-02/2008), chlorambucil again in 2008 for a pleural effusion, R-CVP x 6 cycles from 12/2009-03/2010, radiation (2660 cGy) to the right neck in 06-07/2010 for a persistent local problem, and most recently 6 cycles of rituximab plus bendamustine from 10/2014 - 03/2015.  He has not required additional therapy over the past 8 years.      Pertinent Medications:    Current Outpatient Medications   Medication Sig Dispense Refill    amoxicillin (AMOXIL) 500 MG tablet TAKE 4 TABLETS (2000 MG) 1 HOUR PRIOR TO PROCEDURE, THEN TAKE 1 TABLETS 3 TIMES A DAY UNTIL GONE.      aspirin (ASA) 81 MG chewable tablet Take 81 mg by mouth daily      cholecalciferol (VITAMIN D3) 1250 mcg (81404 units) capsule See Admin Instructions. Twice weekly      clobetasol propionate (TEMOVATE) 0.05 % external cream APPLY A THIN LAYER TWICE DAILY TO THE HANDS AND WRIST FOR UP TO TWO WEEKS PER MONTH WHEN FLARING      ibuprofen (ADVIL/MOTRIN) 800 MG tablet Take 800 mg by mouth every 6 hours as needed       losartan (COZAAR) 50 MG tablet Take 50 mg by mouth daily      tacrolimus (PROTOPIC) 0.1 % external ointment APPLY THIN LAYER TO HANDS AND WRIST AREAS 1-2 TIMES A DAY AFTER STOPPING TOPICAL STEROID.       No current facility-administered medications for this visit.       Pertinent Imaging Reviewed:  CT 3/31/25           IMPRESSION:   1. Solid subcutaneous mass overlying the left pectoralis major  musculature measuring up to 3.0 cm is indeterminate, neoplastic  process is the primary concern. Recommend correlation with tissue  sampling.  2. Slight increase in size of a borderline enlarged right lower  paratracheal lymph node. Several additional prominent/mildly enlarged  mediastinal lymph nodes are stable dating back to 2019. Stable mildly  enlarged left external iliac/deep inguinal lymph node.   3. No acute findings in the chest, abdomen or pelvis.         Most Recent Labs:  Lab Results   Component Value Date    WBC 6.9 03/31/2025    WBC 6.5 04/28/2021     Lab Results   Component Value Date    RBC 5.24 03/31/2025    RBC 4.96 04/28/2021     Lab Results   Component Value Date    HGB 16.2 03/31/2025    HGB 15.2 04/28/2021     Lab Results   Component Value Date    HCT 45.4 03/31/2025    HCT 42.5 04/28/2021     Lab Results   Component Value Date     03/31/2025     04/28/2021    Last Comprehensive Metabolic Panel:  Lab Results   Component Value Date     03/31/2025    POTASSIUM 4.2 03/31/2025    CHLORIDE 105 03/31/2025    CO2 23 03/31/2025    ANIONGAP 12 03/31/2025    GLC 88 03/31/2025    BUN 16.8 03/31/2025    CR 1.13 03/31/2025    GFRESTIMATED 74 03/31/2025    MARI 10.1 03/31/2025      INR   Date Value Ref Range Status   03/31/2025 1.19 (H) 0.85 - 1.15 Final   05/05/2010 1.04 0.86 - 1.14 Final              MISAEL Cyr CNP  Interventional Radiology   [phone] (IR RN triage)

## 2025-04-03 LAB
IGA SERPL-MCNC: 64 MG/DL (ref 84–499)
IGM SERPL-MCNC: 119 MG/DL (ref 35–242)

## 2025-04-10 ENCOUNTER — ANCILLARY PROCEDURE (OUTPATIENT)
Dept: MAMMOGRAPHY | Facility: CLINIC | Age: 60
End: 2025-04-10
Attending: INTERNAL MEDICINE
Payer: COMMERCIAL

## 2025-04-10 DIAGNOSIS — C82.80 OTHER TYPE OF FOLLICULAR LYMPHOMA, UNSPECIFIED BODY REGION (H): ICD-10-CM

## 2025-04-10 DIAGNOSIS — R22.2 CHEST WALL MASS: ICD-10-CM

## 2025-04-10 PROCEDURE — 88341 IMHCHEM/IMCYTCHM EA ADD ANTB: CPT | Mod: TC | Performed by: INTERNAL MEDICINE

## 2025-04-10 PROCEDURE — 88360 TUMOR IMMUNOHISTOCHEM/MANUAL: CPT | Mod: 26 | Performed by: STUDENT IN AN ORGANIZED HEALTH CARE EDUCATION/TRAINING PROGRAM

## 2025-04-10 PROCEDURE — 88305 TISSUE EXAM BY PATHOLOGIST: CPT | Mod: 26 | Performed by: STUDENT IN AN ORGANIZED HEALTH CARE EDUCATION/TRAINING PROGRAM

## 2025-04-10 PROCEDURE — 88341 IMHCHEM/IMCYTCHM EA ADD ANTB: CPT | Mod: 26 | Performed by: STUDENT IN AN ORGANIZED HEALTH CARE EDUCATION/TRAINING PROGRAM

## 2025-04-10 PROCEDURE — 19083 BX BREAST 1ST LESION US IMAG: CPT | Mod: LT | Performed by: RADIOLOGY

## 2025-04-10 PROCEDURE — 88342 IMHCHEM/IMCYTCHM 1ST ANTB: CPT | Mod: 26 | Performed by: STUDENT IN AN ORGANIZED HEALTH CARE EDUCATION/TRAINING PROGRAM

## 2025-04-10 PROCEDURE — 88185 FLOWCYTOMETRY/TC ADD-ON: CPT | Performed by: INTERNAL MEDICINE

## 2025-04-10 PROCEDURE — 88189 FLOWCYTOMETRY/READ 16 & >: CPT | Performed by: PATHOLOGY

## 2025-04-10 PROCEDURE — 88342 IMHCHEM/IMCYTCHM 1ST ANTB: CPT | Mod: TC | Performed by: INTERNAL MEDICINE

## 2025-04-11 LAB
PATH REPORT.COMMENTS IMP SPEC: ABNORMAL
PATH REPORT.COMMENTS IMP SPEC: YES
PATH REPORT.FINAL DX SPEC: ABNORMAL
PATH REPORT.MICROSCOPIC SPEC OTHER STN: ABNORMAL
PATH REPORT.RELEVANT HX SPEC: ABNORMAL

## 2025-04-14 ENCOUNTER — TELEPHONE (OUTPATIENT)
Dept: ONCOLOGY | Facility: CLINIC | Age: 60
End: 2025-04-14
Payer: COMMERCIAL

## 2025-04-14 ENCOUNTER — PATIENT OUTREACH (OUTPATIENT)
Dept: ONCOLOGY | Facility: CLINIC | Age: 60
End: 2025-04-14
Payer: COMMERCIAL

## 2025-04-14 LAB
PATH REPORT.COMMENTS IMP SPEC: ABNORMAL
PATH REPORT.COMMENTS IMP SPEC: YES
PATH REPORT.FINAL DX SPEC: ABNORMAL
PATH REPORT.GROSS SPEC: ABNORMAL
PATH REPORT.MICROSCOPIC SPEC OTHER STN: ABNORMAL
PATH REPORT.RELEVANT HX SPEC: ABNORMAL
PHOTO IMAGE: ABNORMAL

## 2025-04-14 NOTE — TELEPHONE ENCOUNTER
RECORDS STATUS - ALL OTHER DIAGNOSIS      RECORDS RECEIVED FROM: [facility]   NOTES STATUS DETAILS   OFFICE NOTE from referring provider Epic 4/2/2025 - Dr. Fuad Vega   OFFICE NOTE from medical oncologist [facility] 1/16/2019 - Dr. Von Zaidi   MEDICATION LIST [facility]    LABS     PATHOLOGY REPORTS [facility] 4/10/2025 - [id number]   12/9/2009 - [id number]    ANYTHING RELATED TO DIAGNOSIS Epic 4/10/2025   IMAGING (NEED IMAGES & REPORT)     CT SCANS PACS CT CAP: 3/31/2025  CT Neck: 3/31/2025   MRI     MAMMOS PACS 4/2/2025   ULTRASOUND PACS US Breast: 4/10/2025, 4/2/2025   PET     IMAGE DISC [employer] TRACKING   TRACKING #:

## 2025-04-14 NOTE — TELEPHONE ENCOUNTER
I called Isaías regarding the biopsy for the left chest mass suggestive of follicular lymphoma  Will be Dr Marci Salgado this week I suggest he follow with her and will cance his appt next week with me  Aric Vega MD  Chest, left mass biopsy:  -CD10 positive lambda-monotypic B cells (63%)  -No aberrant immunophenotype on T cells  -See comment   Electronically signed by Ly Rodriguez MD on 4/11/2025 at  2:49 PM   Comment    The immunophenotypic findings are consistent with recurrent CD10 positive B-cell lymphoma. Final interpretation requires correlation with morphologic results ([id number]) and clinical features.   Flow Phenotypic Data    93% of total events are CD45 positive and are viable by 7-AAD. A low percentage may be caused by low viability and/or a low number of CD45 positive cells. Of the CD45 positive leukocytes, 94% are viable by 7-AAD.       Unless otherwise indicated, percentages reported below are based on the total number of CD45 positive viable leukocytes. If applicable, percentage of plasma cells is from total viable nucleated cells.     64% B cells which express CD10, CD19 (dim), CD20, CD38 (dim) and monotypic lambda immunoglobulin light chains and lack CD5. The B cells have similar forward scatter relative to background T cells suggestive of similar size; however, precise size determination is deferred to morphology.     Also present are:  15% polytypic B cells  17% T cells with a CD4:CD8 ratio of 2.3:1.   0.4% NK cells

## 2025-04-14 NOTE — PROGRESS NOTES
New Patient Oncology Nurse Navigator Note     Referral Received: 04/14/25      Referring provider: Fuad Vega MD     Referring Clinic/Organization: [facility]     Referred to: Malignant Hematology    Requested provider (if applicable): Dr. Salgado     Evaluation for :   Diagnosis   C82.80 (ICD-10-CM) - Other type of follicular lymphoma, unspecified body region (H)   R22.2 (ICD-10-CM) - Chest wall mass      Clinical History (per Nurse review of records provided):      Narrative & Impression   EXAM: [facility] DIAGNOSTIC DIGITAL BILATERAL, US BREAST LEFT LIMITED 1-3  QUADRANTS, 4/2/2025 2:26 PM     COMPARISONS: None.  CT 3/31/2025 was available for correlation.     HISTORY: Hx of follicular lymphoma with new left chest wall mass     FINDINGS:  No suspicious mammogram finding.  Please not palpable of concern is  beyond field of view.     Targeted left breast ultrasound by radiologist and technologist  demonstrates an irregular mixed cystic and solid mass that correlates  with palpable concern and CT, 11:00 position 13 cm from nipple, 3.9 x  2.8 x 1.8 cm with associated vascularity.  No suspicious left axillary  lymph nodes.     BREAST DENSITY: The breasts are almost entirely fatty.                                                                    IMPRESSION: BI-RADS CATEGORY: 4 - Suspicious.     RECOMMENDED FOLLOW-UP: Biopsy.     Case Report   Flow Cytometry Report                             Case: [id number]                                   Authorizing Provider:  Fuad Vega MD Collected:           04/10/2025 11:03 AM           Ordering Location:     [facility]   Received:            04/10/2025 11:40 AM                                  [facility] [place]                                                   Pathologist:           Ly Rodriguez MD                                                           Specimen:    Breast, Left, Left breast, 11oclock, 13cmFN                                                 Flow Interpretation   A. Chest, left mass biopsy:  -CD10 positive lambda-monotypic B cells (63%)  -No aberrant immunophenotype on T cells  -See comment   Electronically signed by Ly Rodriguze MD on 4/11/2025 at  2:49 PM   Comment    The immunophenotypic findings are consistent with recurrent CD10 positive B-cell lymphoma. Final interpretation requires correlation with morphologic results ([id number]) and clinical features.   Flow Phenotypic Data    93% of total events are CD45 positive and are viable by 7-AAD. A low percentage may be caused by low viability and/or a low number of CD45 positive cells. Of the CD45 positive leukocytes, 94% are viable by 7-AAD.       Unless otherwise indicated, percentages reported below are based on the total number of CD45 positive viable leukocytes. If applicable, percentage of plasma cells is from total viable nucleated cells.     64% B cells which express CD10, CD19 (dim), CD20, CD38 (dim) and monotypic lambda immunoglobulin light chains and lack CD5. The B cells have similar forward scatter relative to background T cells suggestive of similar size; however, precise size determination is deferred to morphology.     Also present are:  15% polytypic B cells  17% T cells with a CD4:CD8 ratio of 2.3:1.   0.4% NK cells     Records Location: [facility]     Additional testing needed prior to consult:     ?    Referral updates and Plan:     04/14/2025 8:33 AM -  Referral received and reviewed. Flow is back. Requesting Dr. Salgado to review. Slot held 4/18.  Called pt at this time, introduced my role as nurse navigator with [facility]ematology/Oncology dept and that we have recd the referral for dx of lymphoma from Dr Vega.  Pt confirms they are aware of the referral and ready to schedule  Provided contact information if future questions arise.     -Transferred pt to NPS line [phone] to schedule appt per scheduling instructions.    Yamileth  TABITHA MelendrezN, RN  Hematology/Oncology Nurse Navigator  [facility] [place]  [phone] / [phone]

## 2025-04-18 ENCOUNTER — PRE VISIT (OUTPATIENT)
Dept: TRANSPLANT | Facility: CLINIC | Age: 60
End: 2025-04-18
Payer: COMMERCIAL

## 2025-05-19 ENCOUNTER — HOSPITAL ENCOUNTER (OUTPATIENT)
Dept: PET IMAGING | Facility: HOSPITAL | Age: 60
Discharge: HOME | End: 2025-05-19
Attending: STUDENT IN AN ORGANIZED HEALTH CARE EDUCATION/TRAINING PROGRAM | Admitting: STUDENT IN AN ORGANIZED HEALTH CARE EDUCATION/TRAINING PROGRAM
Payer: COMMERCIAL

## 2025-05-19 DIAGNOSIS — C82.89 OTHER TYPE OF FOLLICULAR LYMPHOMA OF EXTRANODAL SITE EXCLUDING SPLEEN AND OTHER SOLID ORGANS (H): ICD-10-CM

## 2025-05-19 LAB — GLUCOSE BLDC GLUCOMTR-MCNC: 94 MG/DL (ref 70–99)

## 2025-05-19 PROCEDURE — 78816 PET IMAGE W/CT FULL BODY: CPT | Mod: PS

## 2025-05-19 PROCEDURE — 343N000001 HC RX 343 MED OP 636: Performed by: STUDENT IN AN ORGANIZED HEALTH CARE EDUCATION/TRAINING PROGRAM

## 2025-05-19 PROCEDURE — A9552 F18 FDG: HCPCS | Performed by: STUDENT IN AN ORGANIZED HEALTH CARE EDUCATION/TRAINING PROGRAM

## 2025-05-19 PROCEDURE — 82962 GLUCOSE BLOOD TEST: CPT

## 2025-05-19 RX ORDER — FLUDEOXYGLUCOSE F 18 200 MCI/ML
8-18 INJECTION, SOLUTION INTRAVENOUS ONCE
Status: COMPLETED | OUTPATIENT
Start: 2025-05-19 | End: 2025-05-19

## 2025-05-19 RX ORDER — FLUDEOXYGLUCOSE F 18 200 MCI/ML
8-18 INJECTION, SOLUTION INTRAVENOUS ONCE
Status: DISCONTINUED | OUTPATIENT
Start: 2025-05-19 | End: 2025-05-19

## 2025-05-19 RX ADMIN — FLUDEOXYGLUCOSE F 18 17.51 MILLICURIE: 200 INJECTION, SOLUTION INTRAVENOUS at 08:32

## 2025-05-23 ENCOUNTER — APPOINTMENT (OUTPATIENT)
Dept: LAB | Facility: CLINIC | Age: 60
End: 2025-05-23
Attending: STUDENT IN AN ORGANIZED HEALTH CARE EDUCATION/TRAINING PROGRAM
Payer: COMMERCIAL

## 2025-05-27 ENCOUNTER — PATIENT OUTREACH (OUTPATIENT)
Dept: ONCOLOGY | Facility: CLINIC | Age: 60
End: 2025-05-27
Payer: COMMERCIAL

## 2025-05-27 NOTE — PROGRESS NOTES
New Patient Oncology Nurse Navigator Note     Referral Received: 05/27/25      Referring provider: Marci Salgado MD     Referring Clinic/Organization: [facility]     Referred to: Radiation Oncology     Requested provider (if applicable): [facility]     Evaluation for :   Diagnosis   C82.89 (ICD-10-CM) - Other type of follicular lymphoma of extranodal site excluding spleen and other solid organs (H)   R22.2 (ICD-10-CM) - Chest wall mass      Clinical History (per Nurse review of records provided):      OV 5/23 Damian:   Relapsed follicular lymphoma  - Initially diagnosed with stage Susan grade 1 follicular lymphoma back in 2002.  Has received chlorambucil, rituximab, R-CVP, radiation and BR with his last treatment being in 2015.  He was in a remission for over 10 years and in March 2025, noted a left chest wall mass.  Biopsy of this mass confirmed relapsed follicular lymphoma.  - PET scan shows disease localized to the left chest wall. There is an area of uptake in the palatine tonsil, though this is likely inflammatory. Will watch this closely.  - Referral to Radiation Oncology for radiation to the left chest wall mass  - RTC in 3 months with PET for response assessment.    Radiation History:     Treatment Summary:   Radiation Oncology - Course: 1       Protocol:    Treatment Site      Current Dose        Modality               From      To          Elapsed Days       Fx.   1 R neck and SCV               3,060 cGy            6x/25x     6/08/2010             6/29/2010             21          17   1 Rt Neck Boost      600 cGy             e12          6/30/2010 7/02/2010              2            3   Total      3,660 cGy                            6/08/2010 7/02/2010             23           20     Records Location: [facility]     Additional testing needed prior to consult:     ?    Referral updates and Plan:     05/27/2025 8:58 AM -  Referral received and reviewed. Sent to NPS to  schedule first available.     Yamileth Melenrdez, TABITHAN, RN, OCN  Hematology/Oncology Nurse Navigator  [facility]  [phone] / [phone]

## 2025-06-01 NOTE — PROGRESS NOTES
[facility] Radiation Oncology Consult Note     Patient: Julien Lee  MRN: [id number]  Date of Service: 06/03/2025          Marci Salgado MD  [address] [address]       Dear Dr. Salgado:    Thank you very much for referring this patient for consideration of radiotherapy. As you know Mr. Lee is a 59 year old male with a diagnosis of stage DERIK follicular lymphoma. He was seen today for consideration of RT to an isolated site of recurrence in the L chest.     HISTORY OF PRESENT ILLNESS:   Mr. Lee is a 59 year old male with a history of stage DERIK grade 1 follicular lymphoma diagnosed in 2002. He has received multiple lines of therapy, most recently rituximab/bendamustine in 2015 with CR until this year when he was found to have isolated recurrence involving the L chest.    Stage DERIK - with disease in the lymph nodes within the abdomen and inguinal regions, plus interstitial involvement in the bone marrow. Over the years, he has had disease at other sites, including above the diaphragm, neck, axillae, etc.     Tx history:  [id number]: Chlorambucil  1/2008-2/2008: Rituximab  12/2009-3/2010: R-CVP  6/2010-7/2010: RT to R neck (3660 cGy)  10/2014-3/2015: Bendamustine + Rituximab      He reports that he noticed a mass in the upper L chest in early 2025 that grew slightly over a period of about a month at which time he sought medical care.      3/31/25 CT neck:  1.  No cervical mass or adenopathy.  2.  Subcutaneous soft tissue lesion overlying the left medial pectoralis muscle concerning for recurrent lymphoma. Consider tissue sampling.      3/21/25 CT CAP:  1. Solid subcutaneous mass overlying the left pectoralis major musculature measuring up to 3.0 cm is indeterminate, neoplastic process is the primary concern. Recommend correlation with tissue sampling.  2. Slight increase in size of a borderline enlarged right lower paratracheal lymph node. Several additional prominent/mildly  enlarged mediastinal lymph nodes are stable dating back to 2019. Stable mildly enlarged left external iliac/deep inguinal lymph node.   3. No acute findings in the chest, abdomen or pelvis.       4/2/25 Bilateral diagnostic mammogram and US:  No suspicious mammogram finding.  Please not palpable of concern is beyond field of view.     Targeted left breast ultrasound by radiologist and technologist demonstrates an irregular mixed cystic and solid mass that correlates with palpable concern and CT, 11:00 position 13 cm from nipple, 3.9 x 2.8 x 1.8 cm with associated vascularity.  No suspicious left axillary lymph nodes.      4/10/25 US guised L breast biopsy:  A. BREAST, LEFT BREAST, 11 O'CLOCK:  - Recurrent/persistent follicular lymphoma  - See comment  Concurrent flow cytometry ([id number]) shows CD10 positive lambda-monotypic B cells (63%). There is no evidence of large cell transformation. Concurrent ancillary studies are in progress and will be reported separately. Correlation with the results of ancillary tests and clinical findings is recommended.       4/10/25 Flow cytometry:  A. Chest, left mass biopsy:  -CD10 positive lambda-monotypic B cells (63%)  -No aberrant immunophenotype on T cells  -See comment  The immunophenotypic findings are consistent with recurrent CD10 positive B-cell lymphoma. Final interpretation requires correlation with morphologic results ([id number]) and clinical features.       5/19/25 PET/CT:  1. FDG avid lymphoma involving a mass in the left anteromedial subcutaneous left upper chest.  2. Asymmetric marked uptake in the left palatine tonsillar region as compared to the right, indeterminate, potentially an additional site of lymphoma although could be inflammatory.      On exam today, Isaías reports that he is in his usual state of health.  He states that the mass in the upper left chest has been present to his knowledge since about February or March of this year.  He does not have any  symptoms associated with the mass other than some mild tenderness with palpation.  He has not noticed any overlying skin change.  No fevers, night sweats, weight loss.  He overall feels well at this time and has no specific concerns.  He has a history of prior radiotherapy to the right neck in 2010 but no other radiation history. No implantable devices.  No autoimmune disease or connective tissue disorders. He lives in [place] and works from home.      CHEMOTHERAPY HISTORY: Concurrent Chemotherapy: No    RADIATION THERAPY HISTORY: Prior Radiation: Yes: R neck (see above)    IMPLANTED CARDIAC DEVICE: none     PREGNANCY: N/A    Current Outpatient Medications   Medication Sig Dispense Refill    clobetasol propionate (TEMOVATE) 0.05 % external cream APPLY A THIN LAYER TWICE DAILY TO THE HANDS AND WRIST FOR UP TO TWO WEEKS PER MONTH WHEN FLARING      losartan (COZAAR) 50 MG tablet Take 50 mg by mouth daily      tacrolimus (PROTOPIC) 0.1 % external ointment APPLY THIN LAYER TO HANDS AND WRIST AREAS 1-2 TIMES A DAY AFTER STOPPING TOPICAL STEROID.      ZEPBOUND 5 MG/0.5ML prefilled pen Inject 5 mg subcutaneously every 7 days.      amoxicillin (AMOXIL) 500 MG tablet TAKE 4 TABLETS (2000 MG) 1 HOUR PRIOR TO PROCEDURE, THEN TAKE 1 TABLETS 3 TIMES A DAY UNTIL GONE.      aspirin (ASA) 81 MG chewable tablet Take 81 mg by mouth daily (Patient not taking: Reported on 6/3/2025)      cholecalciferol (VITAMIN D3) 1250 mcg (95519 units) capsule See Admin Instructions. Twice weekly      ibuprofen (ADVIL/MOTRIN) 800 MG tablet Take 800 mg by mouth every 6 hours as needed       Past Medical History:   Diagnosis Date    Follicular lymphoma (H)      No past surgical history on file.  Allergies   Allergen Reactions    Nkda [No Known Drug Allergy]      Family History   Problem Relation Age of Onset    Hypertension Father     Heart Disease Father     Sleep Apnea Brother     Sleep Apnea Brother     Sleep Apnea Brother     Cancer Maternal  Grandmother     Cancer Maternal Grandfather     Heart Disease Paternal Grandfather      Social History     Socioeconomic History    Marital status:      Spouse name: Not on file    Number of children: Not on file    Years of education: Not on file    Highest education level: Not on file   Occupational History    Not on file   Tobacco Use    Smoking status: Never    Smokeless tobacco: Never   Substance and Sexual Activity    Alcohol use: No    Drug use: No    Sexual activity: Yes     Partners: Female   Other Topics Concern     Service No    Blood Transfusions No    Caffeine Concern No    Occupational Exposure No    Hobby Hazards No    Sleep Concern No    Stress Concern No    Weight Concern Yes    Special Diet No    Back Care No    Exercise No    Bike Helmet Yes    Seat Belt Yes    Self-Exams Not Asked   Social History Narrative    Not on file     Social Drivers of Health     Financial Resource Strain: Low Risk  (3/29/2024)    [facility]    Financial Resource Strain     Difficulty of Paying Living Expenses: 3     Difficulty of Paying Living Expenses: Not on file   Food Insecurity: No Food Insecurity (4/1/2024)    [facility]    Food Insecurity     Do you worry your food will run out before you are able to buy more?: 1   Transportation Needs: No Transportation Needs (4/1/2024)    [facility]    Transportation Needs     Does lack of transportation keep you from medical appointments?: 1     Does lack of transportation keep you from work, meetings or getting things that you need?: 1   Physical Activity: Not on file   Stress: Not on file (11/4/2024)   Social Connections: Socially Integrated (4/1/2024)    [facility]    Social Connections     Do you often feel lonely or isolated from those around you?: 0   Interpersonal  Safety: Low Risk  (4/13/2021)    Received from [facility]    Intimate Partner Violence     Insults You: Not on file     Threatens You: Not on file     Screams at You: Not on file     Physically Hurt: Not on file     Intimate Partner Violence Score: Not on file   Housing Stability: Low Risk  (4/1/2024)    Received from [facility]    Housing Stability     What is your housing situation today?: 1        REVIEW OF SYMPTOMS:  A full 14-point review of systems was performed. Pertinent findings are noted in the HPI.    General  Constitutional  Constitutional (WDL): Exceptions to WDL  Fatigue: Absent or within normal limits  Weight Loss: 5 to less than 10% from baseline OR intervention not indicated (intentional)  Hot Flashes: Absent or within normal limits  EENT  Eye Disorders  Eye Disorder (WDL): All eye disorder elements are within defined limits  Ear Disorders  Ear Disorder (WDL): All ear disorder elements are within defined limits  Respiratory  Respiratory  Respiratory (WDL): All respiratory elements are within defined limits  Cardiovascular  Cardiovascular  Cardiovascular (WDL): Exceptions to WDL (no pacemaker)  Gastrointestinal  Gastrointestinal  Gastrointestinal (WDL): Exceptions to WDL  Anorexia: Absent or within normal limits  Nausea: Absent or within normal limits  Vomiting: Absent or within normal limits  Constipation: Occasional or intermittent symptoms OR occasional use of stool softeners, laxatives, dietary modification, or enema  Diarrhea: Increase of less than 4 stools per day over baseline OR mild increase in ostomy output compared to baseline  Musculoskeletal  Musculoskeletal and Connective Tissue Disorders  Musculoskeletal & Connective (WDL): All musculoskeletal & connective elements are within defined limits  Integumentary  Integumentary  Integumentary (WDL): Exceptions to WDL  Alopecia: Absent or within normal limits  Neurological  Neurosensory  Neurosensory (WDL):  Exceptions to WDL  Peripheral Motor Neuropathy: Absent or within normal limits  Ataxia: Absent or within normal limits  Peripheral Sensory Neuropathy: Asymptomatic (toes cold feeling sometimes)  Confusion: Absent or within normal limits  Dizziness: Absent or within normal limits  Syncope: Absent or within normal limits  Dysesthesia: Absent or within normal limits  Genitourinary/Reproductive  Genitourinary  Genitourinary (WDL): All genitourinary elements are within defined limits  Lymphatic  Lymph System Disorders  Lymph (WDL): All lymph elements are within defined limits  Pain  Pain Score: No Pain (0)  AUA Assessment                                                              Accompanied by  Accompanied By: self only    ECOG Status: 0 - Independent    KPS Score: 100% Normal, no complaints    Pain Management Plan: N/A    Recent Labs: No results found for this or any previous visit (from the past week).    Imaging: Imaging results 6 weeks:PET Oncology Whole Body  Result Date: 5/19/2025  EXAM: PET ONCOLOGY WHOLE BODY LOCATION: [facility] DATE: 5/19/2025 INDICATION: Lymphoma, non-Hodgkin, staging. Other types of follicular lymphoma, extranodal and solid organ sites. Left anterior chest wall mass. Initial treatment strategy. COMPARISON: CT neck 3/31/2025, ultrasound left breast/chest 4/2/2025 reviewed. TECHNIQUE: Serum glucose level 94 mg/dL. One hour post intravenous administration of 17.5 mCi FDG, PET imaging was performed from the skull vertex to feet, utilizing attenuation correction with concurrent axial CT and PET/CT image fusion. Dose reduction techniques were used. FINDINGS: A lobulated FDG avid mass involving the subcutaneous anteromedial left upper chest measures 2.2 x 3.1 cm and demonstrates marked FDG uptake (SUVmax 15.3), consistent with malignancy, biopsy-proven lymphoma. Asymmetric soft tissue thickening involves the left palatine tonsillar region associated with asymmetric marked  uptake (SUVmax 13.5, as compared to 6.4 on the right), indeterminate, suspicious for a potential additional site of lymphoma. Normal physiologic FDG uptake elsewhere. No evidence of FDG avid malignancy elsewhere. No other FDG avid adenopathy above or below the diaphragm. Normal size liver and spleen with normal uptake. No suspicious focal skeletal uptake. Mild senescent intracranial changes. Mild reticular scarring in the lung apices. Mild atherosclerotic calcifications including coronary arteries. Mild bandlike scarring or atelectasis in the lower lungs. Few small liver cysts. Mild colonic diverticulosis, greatest in the sigmoid region. Small fat-containing umbilical hernia. Several pelvic phleboliths. Small right scrotal hydrocele.     IMPRESSION: 1. FDG avid lymphoma involving a mass in the left anteromedial subcutaneous left upper chest. 2. Asymmetric marked uptake in the left palatine tonsillar region as compared to the right, indeterminate, potentially an additional site of lymphoma although could be inflammatory.      Pathology:   No results found for this or any previous visit (from the past 8760 hours).  Pathology Results            Malignant - Ultrasound Guided Biopsy, Left - 4/10/2025        Pathology Code Malignancy Type    Lymphoma Other              Additional Information              Concordance: Concordant           External: No                             Objective:        PHYSICAL EXAMINATION:    /73 (BP Location: Right arm, Patient Position: Sitting, Cuff Size: Adult Regular)   Pulse 100   Temp 98  F (36.7  C) (Oral)   Resp 18   Wt 127 kg (280 lb)   SpO2 98%   BMI 36.69 kg/m      Gen: Alert, in NAD  Eyes: PERRL, EOMI, sclera anicteric  HENT     Head: NC/AT     Ears: No external auricular lesions     Nose/sinus: No rhinorrhea or epistaxis     Oropharynx: MMM, no visible oral lesions, subtle fullness of L tonsil vs R without obvious abnormality on exam  Neck: Supple, full ROM, no LAD,  very mild fibrosis on R  Pulm: No wheezing, stridor or respiratory distress  CV: Well-perfused, no cyanosis, no pedal edema  Abdominal: Nondistended  Breasts: There is a palpable subcutaneous firm mobile mass in the upper L breast/chest that measures approx 4 cm  Rectal: Deferred  : Deferred  Musculoskeletal: Normal muscle bulk and tone  Skin: Normal color and turgor  Neurologic: CN II-XII grossly intact, normal gait and station  Psychiatric: Appropriate mood and affect        Intent of Therapy: Palliative  Side effects that may occur during or within weeks after Radiation Therapy    Fatigue and general weakness   Darkening, irritation, itchiness, redness, dryness and peeling of the skin of the chest  Loss of chest and armpit hair  Painful swallowing limiting solid and liquid intake and causing dehydration  Nausea, vomiting and decrease in appetite    Side effects that may occur months or years after Radiation Therapy     Development of another tumor or cancer  Lung inflammation or fibrosis causing cough, fever, and shortness of breath   Fracture of ribs  Permanent narrowing or obstruction of the esophagus  Fluid compressing the lung (pleural effusion)  Coronary artery blockage causing angina pain or a heart attack  Thickening, telangiectasias (development of spider like blood vessels in the skin) and ulceration of the skin of the chest  Poor healing after a trauma or surgery in the irradiated areas  Nerve damage resulting in loss of strength or sensation    The risks, benefits and alternatives to radiation therapy were outlined with the patient. All questions were answered and a consent was signed.                      Impression     Visit Dx:  (C82.89) Other type of follicular lymphoma of extranodal site excluding spleen and other solid organs (H)    (R22.2) Chest wall mass       Cancer Staging   No matching staging information was found for the patient.      Assessment & Plan:   59 year old male with a history  of stage DERIK grade 1 follicular lymphoma diagnosed in 2002. He has received multiple lines of therapy, most recently rituximab/bendamustine in 2015 with CR until this year when he was found to have isolated recurrence involving the L chest.    I had a detailed discussion with Isaías regarding the role of radiotherapy in the overall management of his recurrent low-grade follicular lymphoma.  I discussed that he appears to have a single site of recurrence involving the soft tissues of the left chest/upper breast.  I discussed that radiotherapy can be used in this situation for local control of his disease, potentially alleviating the need for any further systemic therapy at the moment given the apparent lack of systemic involvement at this time.  I discussed that there are 2 general approaches in the situation.  The standard of care involves delivering relatively higher doses to the involved site with the goal of local control.  However, lower doses are also effective at achieving local control although not to the same degree.  I discussed the results of the FoRT trial comparing 24 Gray in 12 fractions to 4 Gray in 2 fractions involved site radiotherapy for indolent non-Hodgkin lymphoma.  2-year local progression free rate was 94.1% versus 79.8% in the high and low-dose groups respectively and 5-year rates were 89.9% and 70.4%.  I discussed that given that this treatment is  palliative in nature and not expected to be curative, either approach is reasonable.  Given his long disease-free interval to this point, he may benefit from more durable local control, however, given the high rates of control overall with 4 Araya, this would be a reasonable starting point and retreatment would be a reasonable and straightforward option if durable local control is not achieved.  I reviewed these options in detail with the patient and he favors the more abbreviated low-dose course which I think is reasonable in this situation.  I  reviewed the logistics as well as expected acute potential late toxicities the proposed treatment in detail with the patient.  Treatment will consist of 2 fractions given over consecutive days.  Toxicities include but not limited to fatigue, skin reaction, soft tissue fibrosis, pneumonitis, increased risk of cardiac events, secondary malignancy, etc.  He had a number of questions which were answered to the best of my ability.  Informed consent was obtained and he was scheduled for a CT simulation in our department.  My tentative plan will be to deliver 4 Gray in 2 fractions to the left upper chest mass.  I would also recommend ongoing monitoring of the site with the plan to offer additional treatment if it is not adequately controlled at this dose.      Total time of this visit, including time spent face-to-face with patient and or via video/audio, and also in preparing for today's visit for MDM and documentation. Medical decision-making included consideration and possible diagnoses, management options, complex record review, review of diagnostic tests and information, consideration and discussion of significant complications based on comorbidities, discussion with providers involved in the care of the patient.     45 Minutes spent.     This note has been dictated using voice recognition software and as a result may contain minor grammatical errors and unintended word substitutions.         Sincerely,      Brant Mccoy MD  Department of Radiation Oncology   [facility] Radiation Oncology  Tel: [phone]  Page: [phone]    [facility]  [address] [place] [address]     [address] [place] [address]    CC:  Patient Care Team:  Chuck Cabrera MD as PCP - General (Family Practice)  Clinic, Fuad Owens MD as MD (Hematology)  Irina Young RN as Specialty Care Coordinator (Hematology & Oncology)  Gary  Fuad VIEIRA MD as Assigned Cancer Care Provider  Marci Salgado MD as MD (Internal Medicine)  Brant Mccoy MD as MD (Radiation Oncology)

## 2025-06-03 ENCOUNTER — OFFICE VISIT (OUTPATIENT)
Dept: RADIATION ONCOLOGY | Facility: HOSPITAL | Age: 60
End: 2025-06-03
Attending: STUDENT IN AN ORGANIZED HEALTH CARE EDUCATION/TRAINING PROGRAM
Payer: COMMERCIAL

## 2025-06-03 VITALS
TEMPERATURE: 98 F | OXYGEN SATURATION: 98 % | WEIGHT: 280 LBS | HEART RATE: 100 BPM | RESPIRATION RATE: 18 BRPM | BODY MASS INDEX: 36.69 KG/M2 | SYSTOLIC BLOOD PRESSURE: 118 MMHG | DIASTOLIC BLOOD PRESSURE: 73 MMHG

## 2025-06-03 DIAGNOSIS — C82.89 OTHER TYPE OF FOLLICULAR LYMPHOMA OF EXTRANODAL SITE EXCLUDING SPLEEN AND OTHER SOLID ORGANS (H): ICD-10-CM

## 2025-06-03 DIAGNOSIS — R22.2 CHEST WALL MASS: ICD-10-CM

## 2025-06-03 PROCEDURE — 77263 THER RADIOLOGY TX PLNG CPLX: CPT | Performed by: STUDENT IN AN ORGANIZED HEALTH CARE EDUCATION/TRAINING PROGRAM

## 2025-06-03 PROCEDURE — 99205 OFFICE O/P NEW HI 60 MIN: CPT | Mod: 25 | Performed by: STUDENT IN AN ORGANIZED HEALTH CARE EDUCATION/TRAINING PROGRAM

## 2025-06-03 PROCEDURE — 99214 OFFICE O/P EST MOD 30 MIN: CPT | Performed by: STUDENT IN AN ORGANIZED HEALTH CARE EDUCATION/TRAINING PROGRAM

## 2025-06-03 PROCEDURE — 3078F DIAST BP <80 MM HG: CPT | Performed by: STUDENT IN AN ORGANIZED HEALTH CARE EDUCATION/TRAINING PROGRAM

## 2025-06-03 PROCEDURE — 1126F AMNT PAIN NOTED NONE PRSNT: CPT | Performed by: STUDENT IN AN ORGANIZED HEALTH CARE EDUCATION/TRAINING PROGRAM

## 2025-06-03 PROCEDURE — 3074F SYST BP LT 130 MM HG: CPT | Performed by: STUDENT IN AN ORGANIZED HEALTH CARE EDUCATION/TRAINING PROGRAM

## 2025-06-03 ASSESSMENT — PAIN SCALES - GENERAL: PAINLEVEL_OUTOF10: NO PAIN (0)

## 2025-06-03 NOTE — PROGRESS NOTES
Radiation Therapy Patient Education    Person involved with teaching: Patient    Patient educational needs for self management of treatment-related side effects assessment completed.  [facility] Patient Ed tab contains Patient Learning Assessment    Education Materials Given  Managing Side Effects of Radiation Therapy: Care Instructions, Dealing With Being Tired From Cancer Treatment: Care Instructions, Radiation Treatment For Cancer, Breathing Instructions for Deep Inspiration Breath Hold (DIBH), Radiation Therapy to the Chest Guidelines, Oncology Supportive Care Services , Welcome Letter, Insurance PA Information, Social Work Services, Radiation Therapy and You, Radiation Therapy Team, Radiation Therapy Treatment, Radiation Therapy: Your Daily Life, Coping with Nausea and Vomiting, and Coping with Fatigue    Educational Topics Discussed  Side effects expected, Pain management, Skin care, Activity, Nutrition and weight loss, and When to call MD/RN    Response To Teaching  Verbalizes understanding    GYN Only  Vaginal Dilator-given and educated: N/A    Referrals sent: None    Chemotherapy?  Yes: History of Chemotherapy in 2008 and 2015    Prior Radiation therapy? Yes, in 2010  No Pacemaker

## 2025-06-03 NOTE — LETTER
6/3/2025      Julien Lee  [address] [address]      Dear Colleague,    Thank you for referring your patient, Julien Lee, to the [facility] RADIATION ONCOLOGY [place]. Please see a copy of my visit note below.    [facility] Radiation Oncology Consult Note     Patient: Julien Lee  MRN: [id number]  Date of Service: 06/03/2025          Marci Salgado MD  [address]  [place] [address]       Dear Dr. Salgado:    Thank you very much for referring this patient for consideration of radiotherapy. As you know Mr. Lee is a 59 year old male with a diagnosis of stage DERIK follicular lymphoma. He was seen today for consideration of RT to an isolated site of recurrence in the L chest.     HISTORY OF PRESENT ILLNESS:   Mr. Lee is a 59 year old male with a history of stage DERIK grade 1 follicular lymphoma diagnosed in 2002. He has received multiple lines of therapy, most recently rituximab/bendamustine in 2015 with CR until this year when he was found to have isolated recurrence involving the L chest.    Stage DERIK - with disease in the lymph nodes within the abdomen and inguinal regions, plus interstitial involvement in the bone marrow. Over the years, he has had disease at other sites, including above the diaphragm, neck, axillae, etc.     Tx history:  9031-1080: Chlorambucil  1/2008-2/2008: Rituximab  12/2009-3/2010: R-CVP  6/2010-7/2010: RT to R neck (3660 cGy)  10/2014-3/2015: Bendamustine + Rituximab      He reports that he noticed a mass in the upper L chest in early 2025 that grew slightly over a period of about a month at which time he sought medical care.      3/31/25 CT neck:  1.  No cervical mass or adenopathy.  2.  Subcutaneous soft tissue lesion overlying the left medial pectoralis muscle concerning for recurrent lymphoma. Consider tissue sampling.      3/21/25 CT CAP:  1. Solid subcutaneous mass overlying the left pectoralis major  musculature measuring up to 3.0 cm is indeterminate, neoplastic process is the primary concern. Recommend correlation with tissue sampling.  2. Slight increase in size of a borderline enlarged right lower paratracheal lymph node. Several additional prominent/mildly enlarged mediastinal lymph nodes are stable dating back to 2019. Stable mildly enlarged left external iliac/deep inguinal lymph node.   3. No acute findings in the chest, abdomen or pelvis.       4/2/25 Bilateral diagnostic mammogram and US:  No suspicious mammogram finding.  Please not palpable of concern is beyond field of view.     Targeted left breast ultrasound by radiologist and technologist demonstrates an irregular mixed cystic and solid mass that correlates with palpable concern and CT, 11:00 position 13 cm from nipple, 3.9 x 2.8 x 1.8 cm with associated vascularity.  No suspicious left axillary lymph nodes.      4/10/25 US guised L breast biopsy:  A. BREAST, LEFT BREAST, 11 O'CLOCK:  - Recurrent/persistent follicular lymphoma  - See comment  Concurrent flow cytometry ([id number]) shows CD10 positive lambda-monotypic B cells (63%). There is no evidence of large cell transformation. Concurrent ancillary studies are in progress and will be reported separately. Correlation with the results of ancillary tests and clinical findings is recommended.       4/10/25 Flow cytometry:  A. Chest, left mass biopsy:  -CD10 positive lambda-monotypic B cells (63%)  -No aberrant immunophenotype on T cells  -See comment  The immunophenotypic findings are consistent with recurrent CD10 positive B-cell lymphoma. Final interpretation requires correlation with morphologic results ([id number]) and clinical features.       5/19/25 PET/CT:  1. FDG avid lymphoma involving a mass in the left anteromedial subcutaneous left upper chest.  2. Asymmetric marked uptake in the left palatine tonsillar region as compared to the right, indeterminate, potentially an additional site of  lymphoma although could be inflammatory.      On exam today, Isaías reports that he is in his usual state of health.  He states that the mass in the upper left chest has been present to his knowledge since about February or March of this year.  He does not have any symptoms associated with the mass other than some mild tenderness with palpation.  He has not noticed any overlying skin change.  No fevers, night sweats, weight loss.  He overall feels well at this time and has no specific concerns.  He has a history of prior radiotherapy to the right neck in 2010 but no other radiation history. No implantable devices.  No autoimmune disease or connective tissue disorders. He lives in [place] and works from home.      CHEMOTHERAPY HISTORY: Concurrent Chemotherapy: No    RADIATION THERAPY HISTORY: Prior Radiation: Yes: R neck (see above)    IMPLANTED CARDIAC DEVICE: none     PREGNANCY: N/A    Current Outpatient Medications   Medication Sig Dispense Refill     clobetasol propionate (TEMOVATE) 0.05 % external cream APPLY A THIN LAYER TWICE DAILY TO THE HANDS AND WRIST FOR UP TO TWO WEEKS PER MONTH WHEN FLARING       losartan (COZAAR) 50 MG tablet Take 50 mg by mouth daily       tacrolimus (PROTOPIC) 0.1 % external ointment APPLY THIN LAYER TO HANDS AND WRIST AREAS 1-2 TIMES A DAY AFTER STOPPING TOPICAL STEROID.       ZEPBOUND 5 MG/0.5ML prefilled pen Inject 5 mg subcutaneously every 7 days.       amoxicillin (AMOXIL) 500 MG tablet TAKE 4 TABLETS (2000 MG) 1 HOUR PRIOR TO PROCEDURE, THEN TAKE 1 TABLETS 3 TIMES A DAY UNTIL GONE.       aspirin (ASA) 81 MG chewable tablet Take 81 mg by mouth daily (Patient not taking: Reported on 6/3/2025)       cholecalciferol (VITAMIN D3) 1250 mcg (57804 units) capsule See Admin Instructions. Twice weekly       ibuprofen (ADVIL/MOTRIN) 800 MG tablet Take 800 mg by mouth every 6 hours as needed       Past Medical History:   Diagnosis Date     Follicular lymphoma (H)      No past surgical  history on file.  Allergies   Allergen Reactions     Nkda [No Known Drug Allergy]      Family History   Problem Relation Age of Onset     Hypertension Father      Heart Disease Father      Sleep Apnea Brother      Sleep Apnea Brother      Sleep Apnea Brother      Cancer Maternal Grandmother      Cancer Maternal Grandfather      Heart Disease Paternal Grandfather      Social History     Socioeconomic History     Marital status:      Spouse name: Not on file     Number of children: Not on file     Years of education: Not on file     Highest education level: Not on file   Occupational History     Not on file   Tobacco Use     Smoking status: Never     Smokeless tobacco: Never   Substance and Sexual Activity     Alcohol use: No     Drug use: No     Sexual activity: Yes     Partners: Female   Other Topics Concern      Service No     Blood Transfusions No     Caffeine Concern No     Occupational Exposure No     Hobby Hazards No     Sleep Concern No     Stress Concern No     Weight Concern Yes     Special Diet No     Back Care No     Exercise No     Bike Helmet Yes     Seat Belt Yes     Self-Exams Not Asked   Social History Narrative     Not on file     Social Drivers of Health     Financial Resource Strain: Low Risk  (3/29/2024)    [facility]    Financial Resource Strain      Difficulty of Paying Living Expenses: 3      Difficulty of Paying Living Expenses: Not on file   Food Insecurity: No Food Insecurity (4/1/2024)    [facility] from [facility]    Food Insecurity      Do you worry your food will run out before you are able to buy more?: 1   Transportation Needs: No Transportation Needs (4/1/2024)    Received from [facility]    Transportation Needs      Does lack of transportation keep you from medical appointments?: 1      Does lack of transportation keep you from work, meetings or getting  things that you need?: 1   Physical Activity: Not on file   Stress: Not on file (11/4/2024)   Social Connections: Socially Integrated (4/1/2024)    Received from [facility]    Social Connections      Do you often feel lonely or isolated from those around you?: 0   Interpersonal Safety: Low Risk  (4/13/2021)    Received from [facility]    Intimate Partner Violence      Insults You: Not on file      Threatens You: Not on file      Screams at You: Not on file      Physically Hurt: Not on file      Intimate Partner Violence Score: Not on file   Housing Stability: Low Risk  (4/1/2024)    Received from [facility]    Housing Stability      What is your housing situation today?: 1        REVIEW OF SYMPTOMS:  A full 14-point review of systems was performed. Pertinent findings are noted in the HPI.    General  Constitutional  Constitutional (WDL): Exceptions to WDL  Fatigue: Absent or within normal limits  Weight Loss: 5 to less than 10% from baseline OR intervention not indicated (intentional)  Hot Flashes: Absent or within normal limits  EENT  Eye Disorders  Eye Disorder (WDL): All eye disorder elements are within defined limits  Ear Disorders  Ear Disorder (WDL): All ear disorder elements are within defined limits  Respiratory  Respiratory  Respiratory (WDL): All respiratory elements are within defined limits  Cardiovascular  Cardiovascular  Cardiovascular (WDL): Exceptions to WDL (no pacemaker)  Gastrointestinal  Gastrointestinal  Gastrointestinal (WDL): Exceptions to WDL  Anorexia: Absent or within normal limits  Nausea: Absent or within normal limits  Vomiting: Absent or within normal limits  Constipation: Occasional or intermittent symptoms OR occasional use of stool softeners, laxatives, dietary modification, or enema  Diarrhea: Increase of less than 4 stools per day over baseline OR mild increase in ostomy output compared to  baseline  Musculoskeletal  Musculoskeletal and Connective Tissue Disorders  Musculoskeletal & Connective (WDL): All musculoskeletal & connective elements are within defined limits  Integumentary  Integumentary  Integumentary (WDL): Exceptions to WDL  Alopecia: Absent or within normal limits  Neurological  Neurosensory  Neurosensory (WDL): Exceptions to WDL  Peripheral Motor Neuropathy: Absent or within normal limits  Ataxia: Absent or within normal limits  Peripheral Sensory Neuropathy: Asymptomatic (toes cold feeling sometimes)  Confusion: Absent or within normal limits  Dizziness: Absent or within normal limits  Syncope: Absent or within normal limits  Dysesthesia: Absent or within normal limits  Genitourinary/Reproductive  Genitourinary  Genitourinary (WDL): All genitourinary elements are within defined limits  Lymphatic  Lymph System Disorders  Lymph (WDL): All lymph elements are within defined limits  Pain  Pain Score: No Pain (0)  AUA Assessment                                                              Accompanied by  Accompanied By: self only    ECOG Status: 0 - Independent    KPS Score: 100% Normal, no complaints    Pain Management Plan: N/A    Recent Labs: No results found for this or any previous visit (from the past week).    Imaging: Imaging results 6 weeks:PET Oncology Whole Body  Result Date: 5/19/2025  EXAM: PET ONCOLOGY WHOLE BODY LOCATION: [facility] DATE: 5/19/2025 INDICATION: Lymphoma, non-Hodgkin, staging. Other types of follicular lymphoma, extranodal and solid organ sites. Left anterior chest wall mass. Initial treatment strategy. COMPARISON: CT neck 3/31/2025, ultrasound left breast/chest 4/2/2025 reviewed. TECHNIQUE: Serum glucose level 94 mg/dL. One hour post intravenous administration of 17.5 mCi FDG, PET imaging was performed from the skull vertex to feet, utilizing attenuation correction with concurrent axial CT and PET/CT image fusion. Dose reduction  techniques were used. FINDINGS: A lobulated FDG avid mass involving the subcutaneous anteromedial left upper chest measures 2.2 x 3.1 cm and demonstrates marked FDG uptake (SUVmax 15.3), consistent with malignancy, biopsy-proven lymphoma. Asymmetric soft tissue thickening involves the left palatine tonsillar region associated with asymmetric marked uptake (SUVmax 13.5, as compared to 6.4 on the right), indeterminate, suspicious for a potential additional site of lymphoma. Normal physiologic FDG uptake elsewhere. No evidence of FDG avid malignancy elsewhere. No other FDG avid adenopathy above or below the diaphragm. Normal size liver and spleen with normal uptake. No suspicious focal skeletal uptake. Mild senescent intracranial changes. Mild reticular scarring in the lung apices. Mild atherosclerotic calcifications including coronary arteries. Mild bandlike scarring or atelectasis in the lower lungs. Few small liver cysts. Mild colonic diverticulosis, greatest in the sigmoid region. Small fat-containing umbilical hernia. Several pelvic phleboliths. Small right scrotal hydrocele.     IMPRESSION: 1. FDG avid lymphoma involving a mass in the left anteromedial subcutaneous left upper chest. 2. Asymmetric marked uptake in the left palatine tonsillar region as compared to the right, indeterminate, potentially an additional site of lymphoma although could be inflammatory.      Pathology:   No results found for this or any previous visit (from the past 8760 hours).  Pathology Results            Malignant - Ultrasound Guided Biopsy, Left - 4/10/2025        Pathology Code Malignancy Type    Lymphoma Other              Additional Information              Concordance: Concordant           External: No                             Objective:        PHYSICAL EXAMINATION:    /73 (BP Location: Right arm, Patient Position: Sitting, Cuff Size: Adult Regular)   Pulse 100   Temp 98  F (36.7  C) (Oral)   Resp 18   Wt 127 kg (280  lb)   SpO2 98%   BMI 36.69 kg/m      Gen: Alert, in NAD  Eyes: PERRL, EOMI, sclera anicteric  HENT     Head: NC/AT     Ears: No external auricular lesions     Nose/sinus: No rhinorrhea or epistaxis     Oropharynx: MMM, no visible oral lesions, subtle fullness of L tonsil vs R without obvious abnormality on exam  Neck: Supple, full ROM, no LAD, very mild fibrosis on R  Pulm: No wheezing, stridor or respiratory distress  CV: Well-perfused, no cyanosis, no pedal edema  Abdominal: Nondistended  Breasts: There is a palpable subcutaneous firm mobile mass in the upper L breast/chest that measures approx 4 cm  Rectal: Deferred  : Deferred  Musculoskeletal: Normal muscle bulk and tone  Skin: Normal color and turgor  Neurologic: CN II-XII grossly intact, normal gait and station  Psychiatric: Appropriate mood and affect        Intent of Therapy: Palliative  Side effects that may occur during or within weeks after Radiation Therapy    Fatigue and general weakness   Darkening, irritation, itchiness, redness, dryness and peeling of the skin of the chest  Loss of chest and armpit hair  Painful swallowing limiting solid and liquid intake and causing dehydration  Nausea, vomiting and decrease in appetite    Side effects that may occur months or years after Radiation Therapy     Development of another tumor or cancer  Lung inflammation or fibrosis causing cough, fever, and shortness of breath   Fracture of ribs  Permanent narrowing or obstruction of the esophagus  Fluid compressing the lung (pleural effusion)  Coronary artery blockage causing angina pain or a heart attack  Thickening, telangiectasias (development of spider like blood vessels in the skin) and ulceration of the skin of the chest  Poor healing after a trauma or surgery in the irradiated areas  Nerve damage resulting in loss of strength or sensation    The risks, benefits and alternatives to radiation therapy were outlined with the patient. All questions were  answered and a consent was signed.                      Impression     Visit Dx:  (C82.89) Other type of follicular lymphoma of extranodal site excluding spleen and other solid organs (H)    (R22.2) Chest wall mass       Cancer Staging   No matching staging information was found for the patient.      Assessment & Plan:   59 year old male with a history of stage DERIK grade 1 follicular lymphoma diagnosed in 2002. He has received multiple lines of therapy, most recently rituximab/bendamustine in 2015 with CR until this year when he was found to have isolated recurrence involving the L chest.    I had a detailed discussion with Isaías regarding the role of radiotherapy in the overall management of his recurrent low-grade follicular lymphoma.  I discussed that he appears to have a single site of recurrence involving the soft tissues of the left chest/upper breast.  I discussed that radiotherapy can be used in this situation for local control of his disease, potentially alleviating the need for any further systemic therapy at the moment given the apparent lack of systemic involvement at this time.  I discussed that there are 2 general approaches in the situation.  The standard of care involves delivering relatively higher doses to the involved site with the goal of local control.  However, lower doses are also effective at achieving local control although not to the same degree.  I discussed the results of the FoRT trial comparing 24 Gray in 12 fractions to 4 Gray in 2 fractions involved site radiotherapy for indolent non-Hodgkin lymphoma.  2-year local progression free rate was 94.1% versus 79.8% in the high and low-dose groups respectively and 5-year rates were 89.9% and 70.4%.  I discussed that given that this treatment is  palliative in nature and not expected to be curative, either approach is reasonable.  Given his long disease-free interval to this point, he may benefit from more durable local control, however, given  the high rates of control overall with 4 Araya, this would be a reasonable starting point and retreatment would be a reasonable and straightforward option if durable local control is not achieved.  I reviewed these options in detail with the patient and he favors the more abbreviated low-dose course which I think is reasonable in this situation.  I reviewed the logistics as well as expected acute potential late toxicities the proposed treatment in detail with the patient.  Treatment will consist of 2 fractions given over consecutive days.  Toxicities include but not limited to fatigue, skin reaction, soft tissue fibrosis, pneumonitis, increased risk of cardiac events, secondary malignancy, etc.  He had a number of questions which were answered to the best of my ability.  Informed consent was obtained and he was scheduled for a CT simulation in our department.  My tentative plan will be to deliver 4 Gray in 2 fractions to the left upper chest mass.  I would also recommend ongoing monitoring of the site with the plan to offer additional treatment if it is not adequately controlled at this dose.      Total time of this visit, including time spent face-to-face with patient and or via video/audio, and also in preparing for today's visit for MDM and documentation. Medical decision-making included consideration and possible diagnoses, management options, complex record review, review of diagnostic tests and information, consideration and discussion of significant complications based on comorbidities, discussion with providers involved in the care of the patient.     45 Minutes spent.     This note has been dictated using voice recognition software and as a result may contain minor grammatical errors and unintended word substitutions.         Sincerely,      Brant Mccoy MD  Department of Radiation Oncology   [facility] Radiation Oncology  Tel: [phone]  Page: [phone]    [facility]  [address] [place] [address]     [facility]   [address] [place] [address]    CC:  Patient Care Team:  Chuck Cabrera MD as PCP - General (Family Practice)  Clinic, Fuad Owens MD as MD (Hematology)  Irina Young, RN as Specialty Care Coordinator (Hematology & Oncology)  Fuad Vega MD as Assigned Cancer Care Provider  Marci Salgado MD as MD (Internal Medicine)  Brant Mccoy MD as MD (Radiation Oncology)        Radiation Therapy Patient Education    Person involved with teaching: Patient    Patient educational needs for self management of treatment-related side effects assessment completed.  EPIC Patient Ed tab contains Patient Learning Assessment    Education Materials Given  Managing Side Effects of Radiation Therapy: Care Instructions, Dealing With Being Tired From Cancer Treatment: Care Instructions, Radiation Treatment For Cancer, Breathing Instructions for Deep Inspiration Breath Hold (DIBH), Radiation Therapy to the Chest Guidelines, Oncology Supportive Care Services , Welcome Letter, Insurance PA Information, Social Work Services, Radiation Therapy and You, Radiation Therapy Team, Radiation Therapy Treatment, Radiation Therapy: Your Daily Life, Coping with Nausea and Vomiting, and Coping with Fatigue    Educational Topics Discussed  Side effects expected, Pain management, Skin care, Activity, Nutrition and weight loss, and When to call MD/RN    Response To Teaching  Verbalizes understanding    GYN Only  Vaginal Dilator-given and educated: N/A    Referrals sent: None    Chemotherapy?  Yes: History of Chemotherapy in 2008 and 2015    Prior Radiation therapy? Yes, in 2010  No Pacemaker    Oncology Rooming Note    Mary 3, 2025 3:09 PM   Julien Lee is a 59 year old male who presents for:    Chief Complaint   Patient presents with     Radiation Therapy     Consultation     Initial Vitals: /73 (BP Location: Right arm,  "Patient Position: Sitting, Cuff Size: Adult Regular)   Pulse 100   Temp 98  F (36.7  C) (Oral)   Resp 18   Wt 127 kg (280 lb)   SpO2 98%   BMI 36.69 kg/m   Estimated body mass index is 36.69 kg/m  as calculated from the following:    Height as of 4/18/25: 1.861 m (6' 1.25\").    Weight as of this encounter: 127 kg (280 lb). Body surface area is 2.56 meters squared.  No Pain (0) Comment: Data Unavailable   No LMP for male patient.  Allergies reviewed: Yes  Medications reviewed: Yes    Medications: Medication refills not needed today.  Pharmacy name entered into [employer]:    [id number] [place], [place] - [address] [place] [address] PHARMACY [place], [place] -  [facility] [phone]    Frailty Screening:   Is the patient here for a new oncology consult visit in cancer care? 2. No    PHQ9:  Did this patient require a PHQ9?: No      Clinical concerns: Pt ambulatory in for Radiation therapy consultation Dr. Mccoy  was notified.      RICHY MERAZ RN               Again, thank you for allowing me to participate in the care of your patient.        Sincerely,        Brant Mccoy MD    Electronically signed"

## 2025-06-03 NOTE — PROGRESS NOTES
"Oncology Rooming Note    Mary 3, 2025 3:09 PM   Julien Lee is a 59 year old male who presents for:    Chief Complaint   Patient presents with    Radiation Therapy     Consultation     Initial Vitals: /73 (BP Location: Right arm, Patient Position: Sitting, Cuff Size: Adult Regular)   Pulse 100   Temp 98  F (36.7  C) (Oral)   Resp 18   Wt 127 kg (280 lb)   SpO2 98%   BMI 36.69 kg/m   Estimated body mass index is 36.69 kg/m  as calculated from the following:    Height as of 4/18/25: 1.861 m (6' 1.25\").    Weight as of this encounter: 127 kg (280 lb). Body surface area is 2.56 meters squared.  No Pain (0) Comment: Data Unavailable   No LMP for male patient.  Allergies reviewed: Yes  Medications reviewed: Yes    Medications: Medication refills not needed today.  Pharmacy name entered into [employer]:    [id number] [place], [place] - [address] [place] [address] PHARMACY [place], [place] - [address] [place] [address] [phone]    Frailty Screening:   Is the patient here for a new oncology consult visit in cancer care? 2. No    PHQ9:  Did this patient require a PHQ9?: No      Clinical concerns: Pt ambulatory in for Radiation therapy consultation Dr. Mccoy  was notified.      RICHY MERAZ RN             "

## 2025-06-04 PROBLEM — C82.89: Status: ACTIVE | Noted: 2025-06-03

## 2025-06-15 ENCOUNTER — HEALTH MAINTENANCE LETTER (OUTPATIENT)
Age: 60
End: 2025-06-15

## 2025-06-17 ENCOUNTER — APPOINTMENT (OUTPATIENT)
Dept: RADIATION ONCOLOGY | Facility: HOSPITAL | Age: 60
End: 2025-06-17
Attending: STUDENT IN AN ORGANIZED HEALTH CARE EDUCATION/TRAINING PROGRAM
Payer: COMMERCIAL

## 2025-06-17 PROCEDURE — 77334 RADIATION TREATMENT AID(S): CPT | Performed by: STUDENT IN AN ORGANIZED HEALTH CARE EDUCATION/TRAINING PROGRAM

## 2025-06-17 PROCEDURE — 77290 THER RAD SIMULAJ FIELD CPLX: CPT | Performed by: STUDENT IN AN ORGANIZED HEALTH CARE EDUCATION/TRAINING PROGRAM

## 2025-06-17 PROCEDURE — 77290 THER RAD SIMULAJ FIELD CPLX: CPT | Mod: 26 | Performed by: STUDENT IN AN ORGANIZED HEALTH CARE EDUCATION/TRAINING PROGRAM

## 2025-06-17 PROCEDURE — 77334 RADIATION TREATMENT AID(S): CPT | Mod: 26 | Performed by: STUDENT IN AN ORGANIZED HEALTH CARE EDUCATION/TRAINING PROGRAM

## 2025-06-23 ENCOUNTER — APPOINTMENT (OUTPATIENT)
Dept: RADIATION ONCOLOGY | Facility: HOSPITAL | Age: 60
End: 2025-06-23
Attending: STUDENT IN AN ORGANIZED HEALTH CARE EDUCATION/TRAINING PROGRAM
Payer: COMMERCIAL

## 2025-06-23 PROCEDURE — 77321 SPECIAL TELETX PORT PLAN: CPT | Mod: 26 | Performed by: STUDENT IN AN ORGANIZED HEALTH CARE EDUCATION/TRAINING PROGRAM

## 2025-06-23 PROCEDURE — 77321 SPECIAL TELETX PORT PLAN: CPT | Performed by: STUDENT IN AN ORGANIZED HEALTH CARE EDUCATION/TRAINING PROGRAM

## 2025-06-23 PROCEDURE — 77334 RADIATION TREATMENT AID(S): CPT | Mod: 26 | Performed by: STUDENT IN AN ORGANIZED HEALTH CARE EDUCATION/TRAINING PROGRAM

## 2025-06-23 PROCEDURE — 77334 RADIATION TREATMENT AID(S): CPT | Performed by: STUDENT IN AN ORGANIZED HEALTH CARE EDUCATION/TRAINING PROGRAM

## 2025-06-24 ENCOUNTER — OFFICE VISIT (OUTPATIENT)
Dept: RADIATION ONCOLOGY | Facility: HOSPITAL | Age: 60
End: 2025-06-24
Attending: STUDENT IN AN ORGANIZED HEALTH CARE EDUCATION/TRAINING PROGRAM
Payer: COMMERCIAL

## 2025-06-24 VITALS
DIASTOLIC BLOOD PRESSURE: 74 MMHG | BODY MASS INDEX: 36.17 KG/M2 | HEART RATE: 97 BPM | OXYGEN SATURATION: 97 % | SYSTOLIC BLOOD PRESSURE: 117 MMHG | WEIGHT: 276 LBS

## 2025-06-24 DIAGNOSIS — C82.89 OTHER TYPE OF FOLLICULAR LYMPHOMA OF EXTRANODAL SITE EXCLUDING SPLEEN AND OTHER SOLID ORGANS (H): Primary | ICD-10-CM

## 2025-06-24 PROCEDURE — 3078F DIAST BP <80 MM HG: CPT | Performed by: STUDENT IN AN ORGANIZED HEALTH CARE EDUCATION/TRAINING PROGRAM

## 2025-06-24 PROCEDURE — 77280 THER RAD SIMULAJ FIELD SMPL: CPT | Performed by: STUDENT IN AN ORGANIZED HEALTH CARE EDUCATION/TRAINING PROGRAM

## 2025-06-24 PROCEDURE — 77412 RADIATION TX DELIVERY LVL 3: CPT | Performed by: STUDENT IN AN ORGANIZED HEALTH CARE EDUCATION/TRAINING PROGRAM

## 2025-06-24 PROCEDURE — 77280 THER RAD SIMULAJ FIELD SMPL: CPT | Mod: 26 | Performed by: STUDENT IN AN ORGANIZED HEALTH CARE EDUCATION/TRAINING PROGRAM

## 2025-06-24 PROCEDURE — 3074F SYST BP LT 130 MM HG: CPT | Performed by: STUDENT IN AN ORGANIZED HEALTH CARE EDUCATION/TRAINING PROGRAM

## 2025-06-24 PROCEDURE — 1126F AMNT PAIN NOTED NONE PRSNT: CPT | Performed by: STUDENT IN AN ORGANIZED HEALTH CARE EDUCATION/TRAINING PROGRAM

## 2025-06-24 ASSESSMENT — PAIN SCALES - GENERAL: PAINLEVEL_OUTOF10: NO PAIN (0)

## 2025-06-24 NOTE — PROGRESS NOTES
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       Tolerating radiation therapy well.  All questions and concerns addressed.  Tolerated treatment without issue today. No concerns    Plan:     Continue radiation treatment as prescribed.  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    Assessment                  Body Site:  Skin Site: Left upper chest  Concurrent Therapy: No  Today's Dose: 200  Total Dose for Skin: 400  Today's Fraction/Total Fraction Skin: 1/2  Drainage: 0: Absent                                   Sexuality Alteration                    Emotional Alteration    Copin: Effective  Comfort Alteration   KPS: 100 % Normal, no complaints  Fatigue (ONS scale): 0: No Fatigue  Pain Location: denies   Nutrition Alteration   Anorexia: 0: None  Nausea: 0: None  Vomitin: None  Weight: 125.2 kg (276 lb)  Skin Alteration   Skin Sensation: 0: No problem  Skin Reaction: 0: None  AUA Assessment                                           Accompanied by       Objective:     Exam:     Vitals:    25 1337   BP: 117/74    Pulse: 97   SpO2: 97%   Weight: 125.2 kg (276 lb)   PainSc: No Pain (0)       Wt Readings from Last 8 Encounters:   06/24/25 125.2 kg (276 lb)   06/03/25 127 kg (280 lb)   05/23/25 128.8 kg (284 lb)   04/18/25 132.5 kg (292 lb 1.6 oz)   04/02/25 133.8 kg (295 lb)   03/11/25 (!) 139.7 kg (308 lb)   07/02/24 132.2 kg (291 lb 6.4 oz)   09/12/23 136.1 kg (300 lb)       General: Alert and oriented, in no acute distress  Julien has no Erythema.    Treatment Summary to Date    Aria chart and setup information reviewed    Brant Mccoy MD

## 2025-06-24 NOTE — LETTER
2025      Julien Lee  [address] [facility] [place] [address]      Dear Colleague,    Thank you for referring your patient, Julien Lee, to the [facility] RADIATION ONCOLOGY [place]. Please see a copy of my visit note below.    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       Tolerating radiation therapy well.  All questions and concerns addressed.  Tolerated treatment without issue today. No concerns    Plan:     Continue radiation treatment as prescribed.  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    Assessment                  Body Site:  Skin Site: Left upper chest  Concurrent Therapy: No  Today's Dose: 200  Total Dose for Skin: 400  Today's Fraction/Total Fraction Skin: 1/2  Drainage: 0: Absent                                   Sexuality Alteration                    Emotional Alteration    Copin: Effective  Comfort Alteration   KPS: 100 % Normal, no complaints  Fatigue (ONS scale): 0: No Fatigue  Pain Location: denies   Nutrition Alteration   Anorexia: 0: None  Nausea: 0:  None  Vomitin: None  Weight: 125.2 kg (276 lb)  Skin Alteration   Skin Sensation: 0: No problem  Skin Reaction: 0: None  AUA Assessment                                           Accompanied by       Objective:     Exam:     Vitals:    25 1337   BP: 117/74   Pulse: 97   SpO2: 97%   Weight: 125.2 kg (276 lb)   PainSc: No Pain (0)       Wt Readings from Last 8 Encounters:   25 125.2 kg (276 lb)   25 127 kg (280 lb)   25 128.8 kg (284 lb)   25 132.5 kg (292 lb 1.6 oz)   25 133.8 kg (295 lb)   25 (!) 139.7 kg (308 lb)   24 132.2 kg (291 lb 6.4 oz)   23 136.1 kg (300 lb)       General: Alert and oriented, in no acute distress  Julien has no Erythema.    Treatment Summary to Date    Aria chart and setup information reviewed    Brant Mccoy MD    Again, thank you for allowing me to participate in the care of your patient.        Sincerely,        Brant Mccoy MD    Electronically signed

## 2025-06-25 ENCOUNTER — ALLIED HEALTH/NURSE VISIT (OUTPATIENT)
Dept: RADIATION ONCOLOGY | Facility: HOSPITAL | Age: 60
End: 2025-06-25
Attending: STUDENT IN AN ORGANIZED HEALTH CARE EDUCATION/TRAINING PROGRAM
Payer: COMMERCIAL

## 2025-06-25 ENCOUNTER — RADIATION TREATMENT SUMMARY (OUTPATIENT)
Dept: RADIATION ONCOLOGY | Facility: HOSPITAL | Age: 60
End: 2025-06-25

## 2025-06-25 DIAGNOSIS — C82.89 OTHER TYPE OF FOLLICULAR LYMPHOMA OF EXTRANODAL SITE EXCLUDING SPLEEN AND OTHER SOLID ORGANS (H): Primary | ICD-10-CM

## 2025-06-25 PROCEDURE — 77412 RADIATION TX DELIVERY LVL 3: CPT | Performed by: STUDENT IN AN ORGANIZED HEALTH CARE EDUCATION/TRAINING PROGRAM

## 2025-06-25 PROCEDURE — 77336 RADIATION PHYSICS CONSULT: CPT | Performed by: STUDENT IN AN ORGANIZED HEALTH CARE EDUCATION/TRAINING PROGRAM

## 2025-06-25 PROCEDURE — 77431 RADIATION THERAPY MANAGEMENT: CPT | Performed by: STUDENT IN AN ORGANIZED HEALTH CARE EDUCATION/TRAINING PROGRAM

## 2025-06-25 NOTE — LETTER
Radiation Treatment Summary    Patient: Julien Lee   MRN: [id number]  : 1965  Care Provider: Brant Mccoy MD    Encounter Date: 2025      HISTORY: Julien Lee was treated with electron radiation therapy.    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     PLAN: Discharge instructions were given and Julien knows to call if questions/issues arise. Julien will be seen in f/u in 3-6  months with a scan.     Pain Management Plan: N/A    Signed by: Brant Mccoy MD    cc:    Patient Care Team:  Chuck Cabrera MD as PCP - General (Family Practice)  Clinic, Fuad Owens MD as MD (Hematology)  Irina Young RN as Specialty Care Coordinator (Hematology & Oncology)  Fuad Vega MD as Assigned Cancer Care Provider  Marci Salgado MD as MD (Internal Medicine)  Brant Mccoy MD as MD (Radiation Oncology)

## 2025-06-25 NOTE — PROGRESS NOTES
Pt ambulatory to radiation clinic for last tx. Discharge instructions given verbally and in writing. Informed to call with any questions or concerns. Follow up to be scheduled prior to discharge from department.   shortness of breath

## 2025-06-25 NOTE — PROGRESS NOTES
Radiation Treatment Summary    Patient: Julien Lee   MRN: [id number]  : 1965  Care Provider: Brant Mccoy MD    Encounter Date: 2025      HISTORY: Julien Lee was treated with electron radiation therapy.    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     PLAN: Discharge instructions were given and Julien knows to call if questions/issues arise. Julien will be seen in f/u in 3-6  months with a scan.     Pain Management Plan: N/A    Signed by: Brant Mccoy MD    cc:    Patient Care Team:  Chuck Cabrera MD as PCP - General (Family Practice)  Clinic, Fuad Owens MD as MD (Hematology)  Irina Young RN as Specialty Care Coordinator (Hematology & Oncology)  Fuad Vega MD as Assigned Cancer Care Provider  Marci Salgado MD as MD (Internal Medicine)  Brant Mccoy MD as MD (Radiation Oncology)

## 2025-07-03 ENCOUNTER — TELEPHONE (OUTPATIENT)
Dept: RADIATION ONCOLOGY | Facility: HOSPITAL | Age: 60
End: 2025-07-03
Payer: COMMERCIAL

## 2025-07-03 NOTE — TELEPHONE ENCOUNTER
Called pt at this time for a routine post radiation follow up call. I had to leave a message; told pt there is no need to call us back if they are feeling fine, but if they do have any questions or concerns to please call.  phone number provided and asked him please to call them directly to set up a follow up visit with Dr. Mccoy for 3+ months.     Ashely Bell RN  Rad [facility] [place]    
You can access the FollowMyHealth Patient Portal offered by [facility] by registering at the following website: [email or web address]. By joining [employer]’s FollowMyHealth portal, you will also be able to view your health information using other applications (apps) compatible with our system.

## 2025-08-05 DIAGNOSIS — Z00.00 HEALTH CARE MAINTENANCE: Primary | ICD-10-CM

## 2025-08-11 ENCOUNTER — LAB (OUTPATIENT)
Dept: LAB | Facility: HOSPITAL | Age: 60
End: 2025-08-11
Attending: STUDENT IN AN ORGANIZED HEALTH CARE EDUCATION/TRAINING PROGRAM
Payer: COMMERCIAL

## 2025-08-11 ENCOUNTER — HOSPITAL ENCOUNTER (OUTPATIENT)
Dept: PET IMAGING | Facility: HOSPITAL | Age: 60
Discharge: HOME | End: 2025-08-11
Attending: STUDENT IN AN ORGANIZED HEALTH CARE EDUCATION/TRAINING PROGRAM
Payer: COMMERCIAL

## 2025-08-11 DIAGNOSIS — C82.89 OTHER TYPE OF FOLLICULAR LYMPHOMA OF EXTRANODAL SITE EXCLUDING SPLEEN AND OTHER SOLID ORGANS (H): ICD-10-CM

## 2025-08-11 DIAGNOSIS — Z00.00 HEALTH CARE MAINTENANCE: ICD-10-CM

## 2025-08-11 LAB
ALBUMIN SERPL BCG-MCNC: 4.6 G/DL (ref 3.5–5.2)
ALP SERPL-CCNC: 92 U/L (ref 40–150)
ALT SERPL W P-5'-P-CCNC: 38 U/L (ref 0–70)
ANION GAP SERPL CALCULATED.3IONS-SCNC: 11 MMOL/L (ref 7–15)
AST SERPL W P-5'-P-CCNC: 25 U/L (ref 0–45)
BASOPHILS # BLD AUTO: 0.1 10E3/UL (ref 0–0.2)
BASOPHILS NFR BLD AUTO: 1 %
BILIRUB SERPL-MCNC: 0.5 MG/DL
BUN SERPL-MCNC: 14.2 MG/DL (ref 8–23)
CALCIUM SERPL-MCNC: 10.2 MG/DL (ref 8.8–10.4)
CHLORIDE SERPL-SCNC: 105 MMOL/L (ref 98–107)
CREAT SERPL-MCNC: 1.14 MG/DL (ref 0.67–1.17)
EGFRCR SERPLBLD CKD-EPI 2021: 74 ML/MIN/1.73M2
EOSINOPHIL # BLD AUTO: 0.2 10E3/UL (ref 0–0.7)
EOSINOPHIL NFR BLD AUTO: 3 %
ERYTHROCYTE [DISTWIDTH] IN BLOOD BY AUTOMATED COUNT: 12.4 % (ref 10–15)
GLUCOSE BLDC GLUCOMTR-MCNC: 107 MG/DL (ref 70–99)
GLUCOSE SERPL-MCNC: 95 MG/DL (ref 70–99)
HCO3 SERPL-SCNC: 25 MMOL/L (ref 22–29)
HCT VFR BLD AUTO: 46.8 % (ref 40–53)
HGB BLD-MCNC: 16.2 G/DL (ref 13.3–17.7)
IMM GRANULOCYTES # BLD: 0.1 10E3/UL
IMM GRANULOCYTES NFR BLD: 1 %
LDH SERPL L TO P-CCNC: 201 U/L (ref 0–250)
LYMPHOCYTES # BLD AUTO: 1.7 10E3/UL (ref 0.8–5.3)
LYMPHOCYTES NFR BLD AUTO: 28 %
MCH RBC QN AUTO: 31.2 PG (ref 26.5–33)
MCHC RBC AUTO-ENTMCNC: 34.6 G/DL (ref 31.5–36.5)
MCV RBC AUTO: 90 FL (ref 78–100)
MONOCYTES # BLD AUTO: 0.7 10E3/UL (ref 0–1.3)
MONOCYTES NFR BLD AUTO: 12 %
NEUTROPHILS # BLD AUTO: 3.4 10E3/UL (ref 1.6–8.3)
NEUTROPHILS NFR BLD AUTO: 56 %
NRBC # BLD AUTO: 0 10E3/UL
NRBC BLD AUTO-RTO: 0 /100
PLATELET # BLD AUTO: 203 10E3/UL (ref 150–450)
POTASSIUM SERPL-SCNC: 4.4 MMOL/L (ref 3.4–5.3)
PROT SERPL-MCNC: 7 G/DL (ref 6.4–8.3)
PSA SERPL DL<=0.01 NG/ML-MCNC: 1.46 NG/ML (ref 0–4.5)
RBC # BLD AUTO: 5.2 10E6/UL (ref 4.4–5.9)
SODIUM SERPL-SCNC: 141 MMOL/L (ref 135–145)
WBC # BLD AUTO: 6.1 10E3/UL (ref 4–11)

## 2025-08-11 PROCEDURE — 85004 AUTOMATED DIFF WBC COUNT: CPT

## 2025-08-11 PROCEDURE — 82962 GLUCOSE BLOOD TEST: CPT

## 2025-08-11 PROCEDURE — 71260 CT THORAX DX C+: CPT

## 2025-08-11 PROCEDURE — 80053 COMPREHEN METABOLIC PANEL: CPT

## 2025-08-11 PROCEDURE — 250N000011 HC RX IP 250 OP 636: Performed by: STUDENT IN AN ORGANIZED HEALTH CARE EDUCATION/TRAINING PROGRAM

## 2025-08-11 PROCEDURE — 343N000001 HC RX 343 MED OP 636: Performed by: STUDENT IN AN ORGANIZED HEALTH CARE EDUCATION/TRAINING PROGRAM

## 2025-08-11 PROCEDURE — 36415 COLL VENOUS BLD VENIPUNCTURE: CPT

## 2025-08-11 PROCEDURE — A9552 F18 FDG: HCPCS | Performed by: STUDENT IN AN ORGANIZED HEALTH CARE EDUCATION/TRAINING PROGRAM

## 2025-08-11 PROCEDURE — 78816 PET IMAGE W/CT FULL BODY: CPT | Mod: PS

## 2025-08-11 PROCEDURE — G0103 PSA SCREENING: HCPCS

## 2025-08-11 PROCEDURE — 83615 LACTATE (LD) (LDH) ENZYME: CPT

## 2025-08-11 PROCEDURE — 70491 CT SOFT TISSUE NECK W/DYE: CPT

## 2025-08-11 RX ORDER — IOPAMIDOL 755 MG/ML
135 INJECTION, SOLUTION INTRAVASCULAR ONCE
Status: COMPLETED | OUTPATIENT
Start: 2025-08-11 | End: 2025-08-11

## 2025-08-11 RX ORDER — FLUDEOXYGLUCOSE F 18 200 MCI/ML
8-18 INJECTION, SOLUTION INTRAVENOUS ONCE
Status: COMPLETED | OUTPATIENT
Start: 2025-08-11 | End: 2025-08-11

## 2025-08-11 RX ADMIN — FLUDEOXYGLUCOSE F 18 16.52 MILLICURIE: 200 INJECTION, SOLUTION INTRAVENOUS at 09:09

## 2025-08-11 RX ADMIN — IOPAMIDOL 135 ML: 755 INJECTION, SOLUTION INTRAVENOUS at 10:15
